# Patient Record
Sex: MALE | Race: WHITE | Employment: OTHER | URBAN - NONMETROPOLITAN AREA
[De-identification: names, ages, dates, MRNs, and addresses within clinical notes are randomized per-mention and may not be internally consistent; named-entity substitution may affect disease eponyms.]

---

## 2016-12-12 LAB — TSH SERPL DL<=0.05 MIU/L-ACNC: 0.96 UIU/ML

## 2017-01-04 ENCOUNTER — ANTI-COAG VISIT (OUTPATIENT)
Dept: PHARMACY | Facility: CLINIC | Age: 67
End: 2017-01-04

## 2017-01-04 LAB
HBA1C MFR BLD: 8 %
INR BLD: 2

## 2017-01-10 DIAGNOSIS — E11.42 DM TYPE 2 WITH DIABETIC PERIPHERAL NEUROPATHY (HCC): Primary | ICD-10-CM

## 2017-01-10 DIAGNOSIS — E03.9 ACQUIRED HYPOTHYROIDISM: ICD-10-CM

## 2017-02-08 ENCOUNTER — ANTI-COAG VISIT (OUTPATIENT)
Dept: PHARMACY | Facility: CLINIC | Age: 67
End: 2017-02-08

## 2017-02-08 LAB — INR BLD: 2.2

## 2017-02-09 ENCOUNTER — OFFICE VISIT (OUTPATIENT)
Dept: CARDIOLOGY CLINIC | Age: 67
End: 2017-02-09

## 2017-02-09 VITALS
BODY MASS INDEX: 34.79 KG/M2 | HEIGHT: 70 IN | OXYGEN SATURATION: 95 % | HEART RATE: 82 BPM | DIASTOLIC BLOOD PRESSURE: 64 MMHG | WEIGHT: 243 LBS | SYSTOLIC BLOOD PRESSURE: 112 MMHG

## 2017-02-09 DIAGNOSIS — E78.2 MIXED HYPERLIPIDEMIA: ICD-10-CM

## 2017-02-09 DIAGNOSIS — I25.10 CORONARY ARTERY DISEASE INVOLVING NATIVE CORONARY ARTERY OF NATIVE HEART WITHOUT ANGINA PECTORIS: ICD-10-CM

## 2017-02-09 DIAGNOSIS — I48.20 CHRONIC ATRIAL FIBRILLATION (HCC): Primary | ICD-10-CM

## 2017-02-09 DIAGNOSIS — I10 ESSENTIAL HYPERTENSION: ICD-10-CM

## 2017-02-09 PROCEDURE — G8484 FLU IMMUNIZE NO ADMIN: HCPCS | Performed by: INTERNAL MEDICINE

## 2017-02-09 PROCEDURE — G8598 ASA/ANTIPLAT THER USED: HCPCS | Performed by: INTERNAL MEDICINE

## 2017-02-09 PROCEDURE — 99214 OFFICE O/P EST MOD 30 MIN: CPT | Performed by: INTERNAL MEDICINE

## 2017-02-09 PROCEDURE — G8427 DOCREV CUR MEDS BY ELIG CLIN: HCPCS | Performed by: INTERNAL MEDICINE

## 2017-02-09 PROCEDURE — 1036F TOBACCO NON-USER: CPT | Performed by: INTERNAL MEDICINE

## 2017-02-09 PROCEDURE — G8417 CALC BMI ABV UP PARAM F/U: HCPCS | Performed by: INTERNAL MEDICINE

## 2017-02-09 PROCEDURE — 3017F COLORECTAL CA SCREEN DOC REV: CPT | Performed by: INTERNAL MEDICINE

## 2017-02-09 RX ORDER — BISACODYL 5 MG/1
1000 TABLET, COATED ORAL DAILY
COMMUNITY
End: 2019-01-08

## 2017-02-09 RX ORDER — LISINOPRIL 10 MG/1
10 TABLET ORAL DAILY
Qty: 90 TABLET | Refills: 3 | Status: SHIPPED | OUTPATIENT
Start: 2017-02-09 | End: 2017-02-10 | Stop reason: SDUPTHER

## 2017-02-09 RX ORDER — PRAVASTATIN SODIUM 20 MG
TABLET ORAL
Qty: 90 TABLET | Refills: 3 | Status: SHIPPED | OUTPATIENT
Start: 2017-02-09 | End: 2017-02-10 | Stop reason: SDUPTHER

## 2017-02-09 RX ORDER — LANOLIN ALCOHOL/MO/W.PET/CERES
1000 CREAM (GRAM) TOPICAL DAILY
COMMUNITY
End: 2019-01-08

## 2017-02-10 RX ORDER — PRAVASTATIN SODIUM 20 MG
TABLET ORAL
Qty: 90 TABLET | Refills: 3 | Status: SHIPPED | OUTPATIENT
Start: 2017-02-10 | End: 2017-08-10 | Stop reason: SDUPTHER

## 2017-02-10 RX ORDER — LISINOPRIL 10 MG/1
10 TABLET ORAL DAILY
Qty: 90 TABLET | Refills: 3 | Status: SHIPPED | OUTPATIENT
Start: 2017-02-10 | End: 2019-03-28 | Stop reason: SDUPTHER

## 2017-02-13 ENCOUNTER — HOSPITAL ENCOUNTER (OUTPATIENT)
Dept: OTHER | Age: 67
Discharge: HOME | End: 2017-02-13
Attending: INTERNAL MEDICINE | Admitting: INTERNAL MEDICINE

## 2017-02-13 ENCOUNTER — TELEPHONE (OUTPATIENT)
Dept: CARDIOLOGY CLINIC | Age: 67
End: 2017-02-13

## 2017-02-13 LAB
CHOLESTEROL, TOTAL: 140 MG/DL (ref 0–199)
HDLC SERPL-MCNC: 32 MG/DL (ref 40–60)
LDL CHOLESTEROL CALCULATED: 72 MG/DL
TRIGL SERPL-MCNC: 182 MG/DL (ref 0–150)
VLDLC SERPL CALC-MCNC: 36 MG/DL

## 2017-03-08 ENCOUNTER — ANTI-COAG VISIT (OUTPATIENT)
Dept: PHARMACY | Facility: CLINIC | Age: 67
End: 2017-03-08

## 2017-03-08 LAB — INR BLD: 1.3

## 2017-03-13 RX ORDER — LEVOTHYROXINE SODIUM 0.07 MG/1
75 TABLET ORAL DAILY
Qty: 90 TABLET | Refills: 1 | Status: SHIPPED | OUTPATIENT
Start: 2017-03-13 | End: 2017-07-24 | Stop reason: SDUPTHER

## 2017-03-14 ENCOUNTER — CARE COORDINATION (OUTPATIENT)
Dept: CARE COORDINATION | Age: 67
End: 2017-03-14

## 2017-03-15 ENCOUNTER — ANTI-COAG VISIT (OUTPATIENT)
Dept: PHARMACY | Facility: CLINIC | Age: 67
End: 2017-03-15

## 2017-03-15 LAB — INR BLD: 2.2

## 2017-03-24 ENCOUNTER — OFFICE VISIT (OUTPATIENT)
Dept: FAMILY MEDICINE CLINIC | Age: 67
End: 2017-03-24

## 2017-03-24 VITALS
BODY MASS INDEX: 35.75 KG/M2 | DIASTOLIC BLOOD PRESSURE: 87 MMHG | TEMPERATURE: 97.5 F | HEART RATE: 61 BPM | SYSTOLIC BLOOD PRESSURE: 111 MMHG | WEIGHT: 249.13 LBS

## 2017-03-24 DIAGNOSIS — J45.41 MODERATE PERSISTENT ASTHMA WITH ACUTE EXACERBATION: ICD-10-CM

## 2017-03-24 DIAGNOSIS — J20.9 ACUTE BRONCHITIS, UNSPECIFIED ORGANISM: Primary | ICD-10-CM

## 2017-03-24 PROCEDURE — 1036F TOBACCO NON-USER: CPT | Performed by: FAMILY MEDICINE

## 2017-03-24 PROCEDURE — G8428 CUR MEDS NOT DOCUMENT: HCPCS | Performed by: FAMILY MEDICINE

## 2017-03-24 PROCEDURE — 99213 OFFICE O/P EST LOW 20 MIN: CPT | Performed by: FAMILY MEDICINE

## 2017-03-24 PROCEDURE — G8598 ASA/ANTIPLAT THER USED: HCPCS | Performed by: FAMILY MEDICINE

## 2017-03-24 PROCEDURE — 1123F ACP DISCUSS/DSCN MKR DOCD: CPT | Performed by: FAMILY MEDICINE

## 2017-03-24 PROCEDURE — 4040F PNEUMOC VAC/ADMIN/RCVD: CPT | Performed by: FAMILY MEDICINE

## 2017-03-24 PROCEDURE — 3017F COLORECTAL CA SCREEN DOC REV: CPT | Performed by: FAMILY MEDICINE

## 2017-03-24 PROCEDURE — G8484 FLU IMMUNIZE NO ADMIN: HCPCS | Performed by: FAMILY MEDICINE

## 2017-03-24 PROCEDURE — 96372 THER/PROPH/DIAG INJ SC/IM: CPT | Performed by: FAMILY MEDICINE

## 2017-03-24 PROCEDURE — G8417 CALC BMI ABV UP PARAM F/U: HCPCS | Performed by: FAMILY MEDICINE

## 2017-03-24 RX ORDER — PREDNISONE 20 MG/1
20 TABLET ORAL 2 TIMES DAILY
Qty: 10 TABLET | Refills: 0 | Status: SHIPPED | OUTPATIENT
Start: 2017-03-24 | End: 2017-03-29

## 2017-03-24 RX ORDER — CEFTRIAXONE 1 G/1
1 INJECTION, POWDER, FOR SOLUTION INTRAMUSCULAR; INTRAVENOUS ONCE
Status: COMPLETED | OUTPATIENT
Start: 2017-03-24 | End: 2017-03-24

## 2017-03-24 RX ORDER — AMOXICILLIN 500 MG/1
1000 CAPSULE ORAL 2 TIMES DAILY
Qty: 40 CAPSULE | Refills: 0 | Status: SHIPPED | OUTPATIENT
Start: 2017-03-24 | End: 2017-04-03

## 2017-03-24 RX ADMIN — CEFTRIAXONE 1 G: 1 INJECTION, POWDER, FOR SOLUTION INTRAMUSCULAR; INTRAVENOUS at 12:03

## 2017-03-29 ENCOUNTER — ANTI-COAG VISIT (OUTPATIENT)
Dept: PHARMACY | Facility: CLINIC | Age: 67
End: 2017-03-29

## 2017-03-29 LAB — INR BLD: 3

## 2017-04-12 LAB — HBA1C MFR BLD: 7.8 %

## 2017-04-14 DIAGNOSIS — E11.42 DM TYPE 2 WITH DIABETIC PERIPHERAL NEUROPATHY (HCC): Primary | ICD-10-CM

## 2017-04-19 ENCOUNTER — TELEPHONE (OUTPATIENT)
Dept: PULMONOLOGY | Age: 67
End: 2017-04-19

## 2017-04-24 RX ORDER — DILTIAZEM HYDROCHLORIDE 360 MG/1
CAPSULE, EXTENDED RELEASE ORAL
Qty: 90 CAPSULE | Refills: 3 | Status: SHIPPED | OUTPATIENT
Start: 2017-04-24 | End: 2018-05-07 | Stop reason: SDUPTHER

## 2017-04-24 RX ORDER — ALBUTEROL SULFATE 90 UG/1
2 AEROSOL, METERED RESPIRATORY (INHALATION) EVERY 6 HOURS PRN
Qty: 3 INHALER | Refills: 1 | Status: SHIPPED | OUTPATIENT
Start: 2017-04-24 | End: 2017-11-20 | Stop reason: SDUPTHER

## 2017-05-04 ENCOUNTER — TELEPHONE (OUTPATIENT)
Dept: PHARMACY | Facility: CLINIC | Age: 67
End: 2017-05-04

## 2017-05-10 ENCOUNTER — ANTI-COAG VISIT (OUTPATIENT)
Dept: PHARMACY | Facility: CLINIC | Age: 67
End: 2017-05-10

## 2017-05-10 LAB — INR BLD: 1.8

## 2017-06-07 ENCOUNTER — ANTI-COAG VISIT (OUTPATIENT)
Dept: PHARMACY | Facility: CLINIC | Age: 67
End: 2017-06-07

## 2017-06-07 ENCOUNTER — OFFICE VISIT (OUTPATIENT)
Dept: FAMILY MEDICINE CLINIC | Age: 67
End: 2017-06-07

## 2017-06-07 VITALS
BODY MASS INDEX: 35.37 KG/M2 | HEART RATE: 54 BPM | DIASTOLIC BLOOD PRESSURE: 93 MMHG | OXYGEN SATURATION: 99 % | WEIGHT: 246.5 LBS | SYSTOLIC BLOOD PRESSURE: 143 MMHG

## 2017-06-07 DIAGNOSIS — R35.1 BPH ASSOCIATED WITH NOCTURIA: ICD-10-CM

## 2017-06-07 DIAGNOSIS — E78.2 MIXED HYPERLIPIDEMIA: ICD-10-CM

## 2017-06-07 DIAGNOSIS — Z51.81 MEDICATION MONITORING ENCOUNTER: ICD-10-CM

## 2017-06-07 DIAGNOSIS — I48.20 CHRONIC ATRIAL FIBRILLATION (HCC): ICD-10-CM

## 2017-06-07 DIAGNOSIS — E03.9 ACQUIRED HYPOTHYROIDISM: ICD-10-CM

## 2017-06-07 DIAGNOSIS — I10 ESSENTIAL HYPERTENSION: ICD-10-CM

## 2017-06-07 DIAGNOSIS — N40.1 BPH ASSOCIATED WITH NOCTURIA: ICD-10-CM

## 2017-06-07 DIAGNOSIS — E11.42 DM TYPE 2 WITH DIABETIC PERIPHERAL NEUROPATHY (HCC): Primary | ICD-10-CM

## 2017-06-07 DIAGNOSIS — I25.10 CORONARY ARTERY DISEASE INVOLVING NATIVE CORONARY ARTERY OF NATIVE HEART WITHOUT ANGINA PECTORIS: ICD-10-CM

## 2017-06-07 LAB
ALT SERPL-CCNC: 17 U/L (ref 10–40)
ANION GAP SERPL CALCULATED.3IONS-SCNC: 16 MMOL/L (ref 3–16)
BUN BLDV-MCNC: 14 MG/DL (ref 7–20)
CALCIUM SERPL-MCNC: 9 MG/DL (ref 8.3–10.6)
CHLORIDE BLD-SCNC: 102 MMOL/L (ref 99–110)
CHOLESTEROL, TOTAL: 145 MG/DL (ref 0–199)
CO2: 23 MMOL/L (ref 21–32)
CREAT SERPL-MCNC: 0.8 MG/DL (ref 0.8–1.3)
GFR AFRICAN AMERICAN: >60
GFR NON-AFRICAN AMERICAN: >60
GLUCOSE BLD-MCNC: 177 MG/DL (ref 70–99)
HDLC SERPL-MCNC: 30 MG/DL (ref 40–60)
INR BLD: 2
LDL CHOLESTEROL CALCULATED: 70 MG/DL
POTASSIUM SERPL-SCNC: 4.6 MMOL/L (ref 3.5–5.1)
SODIUM BLD-SCNC: 141 MMOL/L (ref 136–145)
TRIGL SERPL-MCNC: 225 MG/DL (ref 0–150)
TSH REFLEX: 2.92 UIU/ML (ref 0.27–4.2)
VLDLC SERPL CALC-MCNC: 45 MG/DL

## 2017-06-07 PROCEDURE — 1123F ACP DISCUSS/DSCN MKR DOCD: CPT | Performed by: FAMILY MEDICINE

## 2017-06-07 PROCEDURE — 99214 OFFICE O/P EST MOD 30 MIN: CPT | Performed by: FAMILY MEDICINE

## 2017-06-07 PROCEDURE — G8598 ASA/ANTIPLAT THER USED: HCPCS | Performed by: FAMILY MEDICINE

## 2017-06-07 PROCEDURE — 4040F PNEUMOC VAC/ADMIN/RCVD: CPT | Performed by: FAMILY MEDICINE

## 2017-06-07 PROCEDURE — 3045F PR MOST RECENT HEMOGLOBIN A1C LEVEL 7.0-9.0%: CPT | Performed by: FAMILY MEDICINE

## 2017-06-07 PROCEDURE — 3017F COLORECTAL CA SCREEN DOC REV: CPT | Performed by: FAMILY MEDICINE

## 2017-06-07 PROCEDURE — G8427 DOCREV CUR MEDS BY ELIG CLIN: HCPCS | Performed by: FAMILY MEDICINE

## 2017-06-07 PROCEDURE — 1036F TOBACCO NON-USER: CPT | Performed by: FAMILY MEDICINE

## 2017-06-07 PROCEDURE — 36415 COLL VENOUS BLD VENIPUNCTURE: CPT | Performed by: FAMILY MEDICINE

## 2017-06-07 PROCEDURE — G8417 CALC BMI ABV UP PARAM F/U: HCPCS | Performed by: FAMILY MEDICINE

## 2017-06-07 RX ORDER — TADALAFIL 20 MG/1
20 TABLET ORAL PRN
Qty: 30 TABLET | Refills: 3 | Status: SHIPPED | OUTPATIENT
Start: 2017-06-07 | End: 2017-06-16

## 2017-06-07 RX ORDER — TAMSULOSIN HYDROCHLORIDE 0.4 MG/1
0.4 CAPSULE ORAL DAILY
Qty: 30 CAPSULE | Refills: 3 | Status: SHIPPED | OUTPATIENT
Start: 2017-06-07 | End: 2017-06-23 | Stop reason: CLARIF

## 2017-06-08 ENCOUNTER — TELEPHONE (OUTPATIENT)
Dept: FAMILY MEDICINE CLINIC | Age: 67
End: 2017-06-08

## 2017-06-08 LAB
ESTIMATED AVERAGE GLUCOSE: 165.7 MG/DL
HBA1C MFR BLD: 7.4 %

## 2017-06-14 ENCOUNTER — TELEPHONE (OUTPATIENT)
Dept: FAMILY MEDICINE CLINIC | Age: 67
End: 2017-06-14

## 2017-06-14 RX ORDER — VARDENAFIL HYDROCHLORIDE 20 MG/1
20 TABLET ORAL PRN
Qty: 30 TABLET | Refills: 3 | Status: SHIPPED | OUTPATIENT
Start: 2017-06-14 | End: 2018-08-24 | Stop reason: ALTCHOICE

## 2017-06-18 PROBLEM — R55 SYNCOPE AND COLLAPSE: Status: ACTIVE | Noted: 2017-06-18

## 2017-06-18 PROBLEM — I48.91 ATRIAL FIBRILLATION WITH RVR (HCC): Status: ACTIVE | Noted: 2017-06-18

## 2017-06-18 PROBLEM — R79.1 SUBTHERAPEUTIC INTERNATIONAL NORMALIZED RATIO (INR): Status: ACTIVE | Noted: 2017-06-18

## 2017-06-19 PROBLEM — Z79.01 ANTICOAGULATED ON COUMADIN: Chronic | Status: ACTIVE | Noted: 2017-06-19

## 2017-06-19 PROBLEM — R35.1 BPH ASSOCIATED WITH NOCTURIA: Chronic | Status: ACTIVE | Noted: 2017-06-07

## 2017-06-19 PROBLEM — E66.9 OBESITY (BMI 30-39.9): Chronic | Status: ACTIVE | Noted: 2017-06-19

## 2017-06-19 PROBLEM — N40.1 BPH ASSOCIATED WITH NOCTURIA: Chronic | Status: ACTIVE | Noted: 2017-06-07

## 2017-06-20 ENCOUNTER — CARE COORDINATION (OUTPATIENT)
Dept: CASE MANAGEMENT | Age: 67
End: 2017-06-20

## 2017-06-23 ENCOUNTER — TELEPHONE (OUTPATIENT)
Dept: FAMILY MEDICINE CLINIC | Age: 67
End: 2017-06-23

## 2017-06-23 ENCOUNTER — CARE COORDINATION (OUTPATIENT)
Dept: CASE MANAGEMENT | Age: 67
End: 2017-06-23

## 2017-06-23 RX ORDER — FINASTERIDE 5 MG/1
5 TABLET, FILM COATED ORAL DAILY
Qty: 90 TABLET | Refills: 0 | Status: SHIPPED | OUTPATIENT
Start: 2017-06-23 | End: 2017-08-20 | Stop reason: SDUPTHER

## 2017-07-05 RX ORDER — METOPROLOL TARTRATE 50 MG/1
TABLET, FILM COATED ORAL
Qty: 180 TABLET | Refills: 1 | Status: SHIPPED | OUTPATIENT
Start: 2017-07-05 | End: 2017-08-18 | Stop reason: DRUGHIGH

## 2017-07-07 PROCEDURE — 93272 ECG/REVIEW INTERPRET ONLY: CPT | Performed by: INTERNAL MEDICINE

## 2017-07-10 ENCOUNTER — OFFICE VISIT (OUTPATIENT)
Dept: FAMILY MEDICINE CLINIC | Age: 67
End: 2017-07-10

## 2017-07-10 VITALS
SYSTOLIC BLOOD PRESSURE: 133 MMHG | BODY MASS INDEX: 36.25 KG/M2 | HEART RATE: 69 BPM | OXYGEN SATURATION: 96 % | DIASTOLIC BLOOD PRESSURE: 74 MMHG | WEIGHT: 245.5 LBS

## 2017-07-10 DIAGNOSIS — R55 SYNCOPE AND COLLAPSE: ICD-10-CM

## 2017-07-10 DIAGNOSIS — K31.84 DIABETIC GASTROPARESIS ASSOCIATED WITH TYPE 2 DIABETES MELLITUS (HCC): Primary | ICD-10-CM

## 2017-07-10 DIAGNOSIS — R06.09 DOE (DYSPNEA ON EXERTION): ICD-10-CM

## 2017-07-10 DIAGNOSIS — I48.91 ATRIAL FIBRILLATION WITH RVR (HCC): ICD-10-CM

## 2017-07-10 DIAGNOSIS — E11.43 DIABETIC GASTROPARESIS ASSOCIATED WITH TYPE 2 DIABETES MELLITUS (HCC): Primary | ICD-10-CM

## 2017-07-10 PROCEDURE — 1036F TOBACCO NON-USER: CPT | Performed by: FAMILY MEDICINE

## 2017-07-10 PROCEDURE — 3046F HEMOGLOBIN A1C LEVEL >9.0%: CPT | Performed by: FAMILY MEDICINE

## 2017-07-10 PROCEDURE — G8598 ASA/ANTIPLAT THER USED: HCPCS | Performed by: FAMILY MEDICINE

## 2017-07-10 PROCEDURE — 3017F COLORECTAL CA SCREEN DOC REV: CPT | Performed by: FAMILY MEDICINE

## 2017-07-10 PROCEDURE — 4040F PNEUMOC VAC/ADMIN/RCVD: CPT | Performed by: FAMILY MEDICINE

## 2017-07-10 PROCEDURE — G8427 DOCREV CUR MEDS BY ELIG CLIN: HCPCS | Performed by: FAMILY MEDICINE

## 2017-07-10 PROCEDURE — 99214 OFFICE O/P EST MOD 30 MIN: CPT | Performed by: FAMILY MEDICINE

## 2017-07-10 PROCEDURE — 1111F DSCHRG MED/CURRENT MED MERGE: CPT | Performed by: FAMILY MEDICINE

## 2017-07-10 PROCEDURE — 1123F ACP DISCUSS/DSCN MKR DOCD: CPT | Performed by: FAMILY MEDICINE

## 2017-07-10 PROCEDURE — G8417 CALC BMI ABV UP PARAM F/U: HCPCS | Performed by: FAMILY MEDICINE

## 2017-07-10 RX ORDER — METOCLOPRAMIDE 10 MG/1
10 TABLET ORAL
Qty: 90 TABLET | Refills: 3 | Status: SHIPPED | OUTPATIENT
Start: 2017-07-10 | End: 2017-07-10 | Stop reason: SDUPTHER

## 2017-07-10 RX ORDER — METOCLOPRAMIDE 10 MG/1
10 TABLET ORAL
Qty: 90 TABLET | Refills: 3 | Status: SHIPPED | OUTPATIENT
Start: 2017-07-10 | End: 2017-11-01 | Stop reason: SDUPTHER

## 2017-07-18 DIAGNOSIS — R55 SYNCOPE AND COLLAPSE: Primary | ICD-10-CM

## 2017-07-18 LAB
AVERAGE GLUCOSE: NORMAL
HBA1C MFR BLD: 7.6 %

## 2017-07-19 ENCOUNTER — ANTI-COAG VISIT (OUTPATIENT)
Dept: PHARMACY | Facility: CLINIC | Age: 67
End: 2017-07-19

## 2017-07-19 LAB — INR BLD: 3.5

## 2017-07-24 RX ORDER — LEVOTHYROXINE SODIUM 0.07 MG/1
TABLET ORAL
Qty: 90 TABLET | Refills: 1 | Status: SHIPPED | OUTPATIENT
Start: 2017-07-24 | End: 2017-11-01 | Stop reason: SDUPTHER

## 2017-07-26 ENCOUNTER — TELEPHONE (OUTPATIENT)
Dept: PULMONOLOGY | Age: 67
End: 2017-07-26

## 2017-08-02 DIAGNOSIS — E11.42 DM TYPE 2 WITH DIABETIC PERIPHERAL NEUROPATHY (HCC): Primary | Chronic | ICD-10-CM

## 2017-08-04 DIAGNOSIS — R35.1 BPH ASSOCIATED WITH NOCTURIA: ICD-10-CM

## 2017-08-04 DIAGNOSIS — N40.1 BPH ASSOCIATED WITH NOCTURIA: ICD-10-CM

## 2017-08-04 RX ORDER — TAMSULOSIN HYDROCHLORIDE 0.4 MG/1
CAPSULE ORAL
Qty: 90 CAPSULE | OUTPATIENT
Start: 2017-08-04

## 2017-08-07 ENCOUNTER — OFFICE VISIT (OUTPATIENT)
Dept: FAMILY MEDICINE CLINIC | Age: 67
End: 2017-08-07

## 2017-08-07 VITALS
OXYGEN SATURATION: 97 % | WEIGHT: 247.25 LBS | BODY MASS INDEX: 36.51 KG/M2 | SYSTOLIC BLOOD PRESSURE: 112 MMHG | HEART RATE: 61 BPM | DIASTOLIC BLOOD PRESSURE: 75 MMHG

## 2017-08-07 DIAGNOSIS — K31.84 DIABETIC GASTROPARESIS (HCC): Primary | ICD-10-CM

## 2017-08-07 DIAGNOSIS — E11.43 DIABETIC GASTROPARESIS (HCC): Primary | ICD-10-CM

## 2017-08-07 PROCEDURE — 3017F COLORECTAL CA SCREEN DOC REV: CPT | Performed by: FAMILY MEDICINE

## 2017-08-07 PROCEDURE — 99213 OFFICE O/P EST LOW 20 MIN: CPT | Performed by: FAMILY MEDICINE

## 2017-08-07 PROCEDURE — 4040F PNEUMOC VAC/ADMIN/RCVD: CPT | Performed by: FAMILY MEDICINE

## 2017-08-07 PROCEDURE — G8598 ASA/ANTIPLAT THER USED: HCPCS | Performed by: FAMILY MEDICINE

## 2017-08-07 PROCEDURE — 1123F ACP DISCUSS/DSCN MKR DOCD: CPT | Performed by: FAMILY MEDICINE

## 2017-08-07 PROCEDURE — G8427 DOCREV CUR MEDS BY ELIG CLIN: HCPCS | Performed by: FAMILY MEDICINE

## 2017-08-07 PROCEDURE — G8417 CALC BMI ABV UP PARAM F/U: HCPCS | Performed by: FAMILY MEDICINE

## 2017-08-07 PROCEDURE — 3046F HEMOGLOBIN A1C LEVEL >9.0%: CPT | Performed by: FAMILY MEDICINE

## 2017-08-07 PROCEDURE — 1036F TOBACCO NON-USER: CPT | Performed by: FAMILY MEDICINE

## 2017-08-09 ENCOUNTER — ANTI-COAG VISIT (OUTPATIENT)
Dept: PHARMACY | Facility: CLINIC | Age: 67
End: 2017-08-09

## 2017-08-09 LAB — INR BLD: 1.7

## 2017-08-10 RX ORDER — PRAVASTATIN SODIUM 20 MG
TABLET ORAL
Qty: 90 TABLET | Refills: 3 | Status: SHIPPED | OUTPATIENT
Start: 2017-08-10 | End: 2018-09-18 | Stop reason: SDUPTHER

## 2017-08-10 RX ORDER — LANSOPRAZOLE 30 MG/1
CAPSULE, DELAYED RELEASE ORAL
Qty: 90 CAPSULE | Refills: 3 | Status: SHIPPED | OUTPATIENT
Start: 2017-08-10 | End: 2017-11-01 | Stop reason: CLARIF

## 2017-08-11 ENCOUNTER — OFFICE VISIT (OUTPATIENT)
Dept: PULMONOLOGY | Age: 67
End: 2017-08-11

## 2017-08-11 VITALS
HEIGHT: 69 IN | SYSTOLIC BLOOD PRESSURE: 120 MMHG | RESPIRATION RATE: 16 BRPM | HEART RATE: 87 BPM | TEMPERATURE: 98 F | BODY MASS INDEX: 36.43 KG/M2 | WEIGHT: 246 LBS | DIASTOLIC BLOOD PRESSURE: 78 MMHG | OXYGEN SATURATION: 96 %

## 2017-08-11 DIAGNOSIS — J45.40 MODERATE PERSISTENT ASTHMA WITHOUT COMPLICATION: Primary | Chronic | ICD-10-CM

## 2017-08-11 DIAGNOSIS — R05.9 COUGH: ICD-10-CM

## 2017-08-11 PROCEDURE — 99213 OFFICE O/P EST LOW 20 MIN: CPT | Performed by: INTERNAL MEDICINE

## 2017-08-11 PROCEDURE — G8427 DOCREV CUR MEDS BY ELIG CLIN: HCPCS | Performed by: INTERNAL MEDICINE

## 2017-08-11 PROCEDURE — 1123F ACP DISCUSS/DSCN MKR DOCD: CPT | Performed by: INTERNAL MEDICINE

## 2017-08-11 PROCEDURE — G8598 ASA/ANTIPLAT THER USED: HCPCS | Performed by: INTERNAL MEDICINE

## 2017-08-11 PROCEDURE — 4040F PNEUMOC VAC/ADMIN/RCVD: CPT | Performed by: INTERNAL MEDICINE

## 2017-08-11 PROCEDURE — G8417 CALC BMI ABV UP PARAM F/U: HCPCS | Performed by: INTERNAL MEDICINE

## 2017-08-11 PROCEDURE — 3017F COLORECTAL CA SCREEN DOC REV: CPT | Performed by: INTERNAL MEDICINE

## 2017-08-11 PROCEDURE — 1036F TOBACCO NON-USER: CPT | Performed by: INTERNAL MEDICINE

## 2017-08-11 RX ORDER — ALBUTEROL SULFATE 90 UG/1
2 AEROSOL, METERED RESPIRATORY (INHALATION) EVERY 6 HOURS PRN
Qty: 3 INHALER | Refills: 1 | Status: SHIPPED | OUTPATIENT
Start: 2017-08-11 | End: 2017-11-20 | Stop reason: SDUPTHER

## 2017-08-11 RX ORDER — IPRATROPIUM BROMIDE AND ALBUTEROL SULFATE 2.5; .5 MG/3ML; MG/3ML
1 SOLUTION RESPIRATORY (INHALATION) EVERY 6 HOURS
Qty: 1080 ML | Refills: 1 | Status: SHIPPED | OUTPATIENT
Start: 2017-08-11 | End: 2018-11-29 | Stop reason: SDUPTHER

## 2017-08-18 ENCOUNTER — OFFICE VISIT (OUTPATIENT)
Dept: CARDIOLOGY CLINIC | Age: 67
End: 2017-08-18

## 2017-08-18 VITALS
HEART RATE: 66 BPM | BODY MASS INDEX: 35.84 KG/M2 | OXYGEN SATURATION: 97 % | HEIGHT: 69 IN | SYSTOLIC BLOOD PRESSURE: 120 MMHG | WEIGHT: 242 LBS | DIASTOLIC BLOOD PRESSURE: 78 MMHG

## 2017-08-18 DIAGNOSIS — I48.20 CHRONIC ATRIAL FIBRILLATION (HCC): Primary | Chronic | ICD-10-CM

## 2017-08-18 PROCEDURE — 93000 ELECTROCARDIOGRAM COMPLETE: CPT | Performed by: INTERNAL MEDICINE

## 2017-08-18 PROCEDURE — 99214 OFFICE O/P EST MOD 30 MIN: CPT | Performed by: INTERNAL MEDICINE

## 2017-08-18 PROCEDURE — 3017F COLORECTAL CA SCREEN DOC REV: CPT | Performed by: INTERNAL MEDICINE

## 2017-08-18 PROCEDURE — G8598 ASA/ANTIPLAT THER USED: HCPCS | Performed by: INTERNAL MEDICINE

## 2017-08-18 PROCEDURE — G8417 CALC BMI ABV UP PARAM F/U: HCPCS | Performed by: INTERNAL MEDICINE

## 2017-08-18 PROCEDURE — 1036F TOBACCO NON-USER: CPT | Performed by: INTERNAL MEDICINE

## 2017-08-18 PROCEDURE — G8427 DOCREV CUR MEDS BY ELIG CLIN: HCPCS | Performed by: INTERNAL MEDICINE

## 2017-08-18 PROCEDURE — 4040F PNEUMOC VAC/ADMIN/RCVD: CPT | Performed by: INTERNAL MEDICINE

## 2017-08-18 PROCEDURE — 1123F ACP DISCUSS/DSCN MKR DOCD: CPT | Performed by: INTERNAL MEDICINE

## 2017-08-18 RX ORDER — METOPROLOL TARTRATE 50 MG/1
TABLET, FILM COATED ORAL
Qty: 180 TABLET | Refills: 1 | Status: SHIPPED | OUTPATIENT
Start: 2017-08-18 | End: 2017-12-04 | Stop reason: SDUPTHER

## 2017-08-21 RX ORDER — FINASTERIDE 5 MG/1
TABLET, FILM COATED ORAL
Qty: 90 TABLET | Refills: 1 | Status: SHIPPED | OUTPATIENT
Start: 2017-08-21 | End: 2019-01-08

## 2017-09-06 ENCOUNTER — ANTI-COAG VISIT (OUTPATIENT)
Dept: PHARMACY | Facility: CLINIC | Age: 67
End: 2017-09-06

## 2017-09-06 DIAGNOSIS — I48.20 CHRONIC ATRIAL FIBRILLATION (HCC): ICD-10-CM

## 2017-09-06 LAB — INR BLD: 1.5

## 2017-09-25 ENCOUNTER — ANTI-COAG VISIT (OUTPATIENT)
Dept: PHARMACY | Facility: CLINIC | Age: 67
End: 2017-09-25

## 2017-09-25 LAB — INR BLD: 2.2

## 2017-10-12 ENCOUNTER — TELEPHONE (OUTPATIENT)
Dept: FAMILY MEDICINE CLINIC | Age: 67
End: 2017-10-12

## 2017-10-16 LAB
AVERAGE GLUCOSE: NORMAL
HBA1C MFR BLD: 7.1 %

## 2017-10-18 DIAGNOSIS — E11.42 DM TYPE 2 WITH DIABETIC PERIPHERAL NEUROPATHY (HCC): Primary | Chronic | ICD-10-CM

## 2017-10-30 ENCOUNTER — TELEPHONE (OUTPATIENT)
Dept: FAMILY MEDICINE CLINIC | Age: 67
End: 2017-10-30

## 2017-10-30 DIAGNOSIS — R25.1 TREMOR OF LEFT HAND: Primary | ICD-10-CM

## 2017-10-30 NOTE — TELEPHONE ENCOUNTER
Patient's wife calling wanting to know if patient can be referred to [facility] for his left hand tremor, please advise.

## 2017-11-01 DIAGNOSIS — E11.43 DIABETIC GASTROPARESIS ASSOCIATED WITH TYPE 2 DIABETES MELLITUS (HCC): ICD-10-CM

## 2017-11-01 DIAGNOSIS — K31.84 DIABETIC GASTROPARESIS ASSOCIATED WITH TYPE 2 DIABETES MELLITUS (HCC): ICD-10-CM

## 2017-11-01 RX ORDER — PANTOPRAZOLE SODIUM 40 MG/1
40 TABLET, DELAYED RELEASE ORAL DAILY
Qty: 90 TABLET | Refills: 1 | Status: SHIPPED | OUTPATIENT
Start: 2017-11-01 | End: 2018-04-02 | Stop reason: SDUPTHER

## 2017-11-01 RX ORDER — LEVOTHYROXINE SODIUM 0.07 MG/1
TABLET ORAL
Qty: 90 TABLET | Refills: 1 | Status: SHIPPED | OUTPATIENT
Start: 2017-11-01 | End: 2018-06-12 | Stop reason: SDUPTHER

## 2017-11-01 RX ORDER — METOCLOPRAMIDE 10 MG/1
10 TABLET ORAL
Qty: 270 TABLET | Refills: 0 | Status: SHIPPED | OUTPATIENT
Start: 2017-11-01 | End: 2017-12-06 | Stop reason: SINTOL

## 2017-11-02 ENCOUNTER — TELEPHONE (OUTPATIENT)
Dept: FAMILY MEDICINE CLINIC | Age: 67
End: 2017-11-02

## 2017-11-03 ENCOUNTER — OFFICE VISIT (OUTPATIENT)
Dept: ORTHOPEDIC SURGERY | Age: 67
End: 2017-11-03

## 2017-11-03 VITALS — WEIGHT: 242.06 LBS | BODY MASS INDEX: 35.85 KG/M2 | HEIGHT: 69 IN

## 2017-11-03 DIAGNOSIS — M25.522 BILATERAL ELBOW JOINT PAIN: Primary | ICD-10-CM

## 2017-11-03 DIAGNOSIS — M25.522 PAIN OF BOTH ELBOWS: ICD-10-CM

## 2017-11-03 DIAGNOSIS — M25.521 PAIN OF BOTH ELBOWS: ICD-10-CM

## 2017-11-03 DIAGNOSIS — M25.521 BILATERAL ELBOW JOINT PAIN: Primary | ICD-10-CM

## 2017-11-03 DIAGNOSIS — M19.022 PRIMARY OSTEOARTHRITIS OF LEFT ELBOW: ICD-10-CM

## 2017-11-03 PROCEDURE — 3017F COLORECTAL CA SCREEN DOC REV: CPT | Performed by: ORTHOPAEDIC SURGERY

## 2017-11-03 PROCEDURE — G8417 CALC BMI ABV UP PARAM F/U: HCPCS | Performed by: ORTHOPAEDIC SURGERY

## 2017-11-03 PROCEDURE — 1036F TOBACCO NON-USER: CPT | Performed by: ORTHOPAEDIC SURGERY

## 2017-11-03 PROCEDURE — 4040F PNEUMOC VAC/ADMIN/RCVD: CPT | Performed by: ORTHOPAEDIC SURGERY

## 2017-11-03 PROCEDURE — 99203 OFFICE O/P NEW LOW 30 MIN: CPT | Performed by: ORTHOPAEDIC SURGERY

## 2017-11-03 PROCEDURE — 1123F ACP DISCUSS/DSCN MKR DOCD: CPT | Performed by: ORTHOPAEDIC SURGERY

## 2017-11-03 PROCEDURE — G8484 FLU IMMUNIZE NO ADMIN: HCPCS | Performed by: ORTHOPAEDIC SURGERY

## 2017-11-03 PROCEDURE — G8598 ASA/ANTIPLAT THER USED: HCPCS | Performed by: ORTHOPAEDIC SURGERY

## 2017-11-03 PROCEDURE — G8427 DOCREV CUR MEDS BY ELIG CLIN: HCPCS | Performed by: ORTHOPAEDIC SURGERY

## 2017-11-03 NOTE — PROGRESS NOTES
DIAGNOSTIC CARDIAC CATH LAB PROCEDURE  8/9/13    ENDOSCOPY, COLON, DIAGNOSTIC  06/28/2016    EGD gastritis    FOOT SURGERY       Social History     Social History    Marital status:      Spouse name: N/A    Number of children: N/A    Years of education: N/A     Social History Main Topics    Smoking status: Former Smoker     Packs/day: 1.00     Years: 20.00     Types: Cigarettes     Quit date: 1/1/1990    Smokeless tobacco: Never Used    Alcohol use Yes      Comment: occas    Drug use: No    Sexual activity: Yes     Partners: Female     Other Topics Concern    None     Social History Narrative    None     Current Outpatient Prescriptions   Medication Sig Dispense Refill    metoclopramide (REGLAN) 10 MG tablet Take 1 tablet by mouth 3 times daily (with meals) 270 tablet 0    levothyroxine (SYNTHROID) 75 MCG tablet Take 1 tablet by mouth  daily 90 tablet 1    pantoprazole (PROTONIX) 40 MG tablet Take 1 tablet by mouth daily 90 tablet 1    glucose blood VI test strips (CRUZITO CONTOUR NEXT TEST) strip Testing 4 x a day DX: E11.42 125 each 5    insulin aspart (NOVOLOG FLEXPEN) 100 UNIT/ML injection vial Inject 25 Units into the skin 3 times daily Indications: 25-30 units 3 times a day Dispense flexpens 3 vial 3    finasteride (PROSCAR) 5 MG tablet Take 1 tablet by mouth  daily Stop Flomax 90 tablet 1    metoprolol tartrate (LOPRESSOR) 50 MG tablet TAKE 1 TABLET daily 180 tablet 1    fluticasone-salmeterol (ADVAIR DISKUS) 250-50 MCG/DOSE AEPB Inhale 1 puff into the lungs 2 times daily 3 each 1    albuterol sulfate HFA (PROAIR HFA) 108 (90 Base) MCG/ACT inhaler Inhale 2 puffs into the lungs every 6 hours as needed for Wheezing or Shortness of Breath 3 Inhaler 1    ipratropium-albuterol (DUONEB) 0.5-2.5 (3) MG/3ML SOLN nebulizer solution Inhale 3 mLs into the lungs every 6 hours DX Asthma 493.90 1080 mL 1    pravastatin (PRAVACHOL) 20 MG tablet TAKE 1 TABLET DAILY 90 tablet 3    insulin detemir (LEVEMIR FLEXTOUCH) 100 UNIT/ML injection pen Inject 50 Units into the skin 2 times daily 12 Pen 3    warfarin (COUMADIN) 10 MG tablet Take 10 mg by mouth      vardenafil (LEVITRA) 20 MG tablet Take 1 tablet by mouth as needed for Erectile Dysfunction 30 tablet 3    metFORMIN (GLUCOPHAGE) 1000 MG tablet TAKE 1 TABLET BY MOUTH 2 TIMES DAILY (WITH MEALS). 180 tablet 3    albuterol sulfate HFA (PROVENTIL HFA) 108 (90 BASE) MCG/ACT inhaler Inhale 2 puffs into the lungs every 6 hours as needed for Wheezing 3 Inhaler 1    diltiazem (CARDIZEM CD) 360 MG extended release capsule TAKE 1 CAPSULE DAILY 90 capsule 3    lisinopril (PRINIVIL;ZESTRIL) 10 MG tablet Take 1 tablet by mouth daily 90 tablet 3    vitamin B-12 (CYANOCOBALAMIN) 1000 MCG tablet Take 1,000 mcg by mouth daily      Omega-3 Fatty Acids (SB OMEGA-3 FISH OIL) 1000 MG CAPS Take 1,000 mg by mouth daily Ciro Red      meclizine (ANTIVERT) 25 MG tablet Take 1 tablet by mouth 3 times daily as needed for Dizziness 40 tablet 0    Misc. Devices ([facility]) MISC Please provide pt with Acapella, patient to use 10 breath QID. 1 each 0    Albiglutide 50 MG PEN Inject into the skin Use 1 time weekly - Dr. Hernandez Stewart      fluticasone [facility]) 50 MCG/ACT nasal spray 2 sprays by Nasal route daily 3 Bottle 1    Insulin Pen Needle (NOVOFINE) 30G X 8 MM MISC APPLY 1 EACH TOPICALLY 2 TIMES DAILY. 300 each 2    Cholecalciferol (VITAMIN D) 2000 UNITS CAPS capsule Take  by mouth.  nystatin (MYCOSTATIN) powder Apply 3 times daily. 1 Bottle 5     No current facility-administered medications for this visit. Allergies   Allergen Reactions    Lyrica [Pregabalin] Swelling    Simvastatin      Increased CPK       Review of Systems  Pertinent items are noted in HPI  Review of systems reviewed from Patient History Form dated on 11/3/17 and available in the patient's chart under the Media tab. Vital Signs  There were no vitals filed for this visit.     General Exam: tremor    Impression:  Encounter Diagnoses   Name Primary?  Bilateral elbow joint pain Yes    Pain of both elbows     Primary osteoarthritis of left elbow        Office Procedures:  Orders Placed This Encounter   Procedures    XR ELBOW RIGHT (MIN 3 VIEWS)     [id number]     Order Specific Question:   Reason for exam:     Answer:   Elbow Pain    XR ELBOW LEFT (MIN 3 VIEWS)     [id number]     Order Specific Question:   Reason for exam:     Answer:   Elbow Pain       Treatment Plan:  His main concern now is the tremor. I do not think that is emanating from his ulnar nerve or carpal tunnel. He does have an appointment with a neurologist which I think is most appropriate. I will be happy to see him back on an as-needed basis if there are any other peripheral nerve concerns, but for now we focused on modifying activities and occasionally using a padded elbow sleeve on the left side. Please note that this transcription was created using voice recognition software. Any errors are unintentional and may be due to voice recognition transcription.

## 2017-11-06 ENCOUNTER — ANTI-COAG VISIT (OUTPATIENT)
Dept: PHARMACY | Facility: CLINIC | Age: 67
End: 2017-11-06

## 2017-11-06 LAB — INR BLD: 2.2

## 2017-11-06 NOTE — PROGRESS NOTES
is here for management of anticoagulation for AFib. PMH also significant for DM, Hyopthyroid, GERD, HTN, CAD, COPD. He presents today w/out complaint. Pt verifies dosing regimen as listed above. Pt denies s/s bleeding/bruising/swelling/SOB. No BRBPR. No melena. No missed doses  Reviewed pt medication list.  No changes in RX/OTCs/Herbal medications. Reviewed dietary concerns. Pt states that he does not pay attention to diet, including for his diabetes. INR 2.2 is within acceptable therapeutic range of 2-3. Recommend to continue 10 mg daily except 15 mg on Wed. Patient has 5 mg and 2 mg tablets. Will continue to monitor and check INR in 6 weeks. Dosing reminder card given with phone number, appointment date and time.    Return to clinic:  12/20 @ 10:00am

## 2017-11-16 ENCOUNTER — OFFICE VISIT (OUTPATIENT)
Dept: CARDIOLOGY CLINIC | Age: 67
End: 2017-11-16

## 2017-11-16 VITALS
HEART RATE: 72 BPM | SYSTOLIC BLOOD PRESSURE: 110 MMHG | HEIGHT: 69 IN | BODY MASS INDEX: 37.18 KG/M2 | OXYGEN SATURATION: 96 % | WEIGHT: 251 LBS | DIASTOLIC BLOOD PRESSURE: 62 MMHG

## 2017-11-16 DIAGNOSIS — E78.2 MIXED HYPERLIPIDEMIA: Chronic | ICD-10-CM

## 2017-11-16 DIAGNOSIS — I48.20 CHRONIC ATRIAL FIBRILLATION (HCC): Chronic | ICD-10-CM

## 2017-11-16 DIAGNOSIS — I10 ESSENTIAL HYPERTENSION: Chronic | ICD-10-CM

## 2017-11-16 DIAGNOSIS — Z98.890 S/P CARDIAC CATH: Primary | Chronic | ICD-10-CM

## 2017-11-16 PROCEDURE — 3017F COLORECTAL CA SCREEN DOC REV: CPT | Performed by: INTERNAL MEDICINE

## 2017-11-16 PROCEDURE — G8417 CALC BMI ABV UP PARAM F/U: HCPCS | Performed by: INTERNAL MEDICINE

## 2017-11-16 PROCEDURE — 99214 OFFICE O/P EST MOD 30 MIN: CPT | Performed by: INTERNAL MEDICINE

## 2017-11-16 PROCEDURE — G8484 FLU IMMUNIZE NO ADMIN: HCPCS | Performed by: INTERNAL MEDICINE

## 2017-11-16 PROCEDURE — 1123F ACP DISCUSS/DSCN MKR DOCD: CPT | Performed by: INTERNAL MEDICINE

## 2017-11-16 PROCEDURE — 1036F TOBACCO NON-USER: CPT | Performed by: INTERNAL MEDICINE

## 2017-11-16 PROCEDURE — 4040F PNEUMOC VAC/ADMIN/RCVD: CPT | Performed by: INTERNAL MEDICINE

## 2017-11-16 PROCEDURE — G8598 ASA/ANTIPLAT THER USED: HCPCS | Performed by: INTERNAL MEDICINE

## 2017-11-16 PROCEDURE — G8427 DOCREV CUR MEDS BY ELIG CLIN: HCPCS | Performed by: INTERNAL MEDICINE

## 2017-11-16 NOTE — PROGRESS NOTES
[address]   Cardiac Followup    Referring Provider:  Brandon Jackson MD     Chief Complaint   Patient presents with    6 Month Follow-Up     ER 06/18-06/19/2017, ekg 08/18/2017, JMB 08/18/2017    Atrial Fibrillation    Coronary Artery Disease    Hyperlipidemia    Hypertension    Results     carotid 06/19/2017, HM 07/18/2017 & echo 06/19/2017    Discuss Labs     06/19/2017    Shortness of Breath    Edema     ankles    Fatigue     zero energy        History of Present Illness:    Mr. Vero Burk is a 72yo male here today for routine cardiology follow up of his mild non-obstructive CAD, GERD, HTN, and chronic atrial fibrillation. Last OV was February 2017. He had left heart cath on 8/1/06 showed LVEF of 60% with 20% stenosis in the 1st diagonal, luminal irregularities in the LAD, and very mild non-obstructive CAD. [facility] manages his PT/INR and adjusts his medications. He underwent most recent cardiac cath on 8/9/13 which showed mild non-obstructive disease (20% LM, 30% LAD, <20% CCx, 20% RCA, and LVEF=50%) and patient is being managed medically. No need for PCI. Most recent SAJI BLE 1/4/2016 showed no evidence of stenosis in BLE. Normal SAJI's ankle-brachial  measured at 1.19 on the right and 1.25 on the left. Most recent EKG 8/18/17 AFIB 59 bpm nonspecific ST change. Most recent Cardiac event monitor 6/20-7/3/17 AFIB showed an average HR of 85 ()  was brief. Most recent ECHO 6/19/17 EF 55-60% mild LVH, elevated diastolic filling pressure, mild LA enlargement (no change from 2012). Carotid US June 2017 was negative for blockage   Today, he reports feeling OK overall and he continues to report chronic SOB which is related to his CLARISSE and asthma. He refuses to wear CPAP. He has  fatigue which is not new. He denies CP, palpitations, dizziness, or syncope. He farms cattle.  His friend Sharla Ortiz is present at exam. She reports he has new tremors in his BUE and she has him topically daily. Faxed to [facility] 1/18/12   Cruzito Valdez MD   tiotropium (SPIRIVA HANDIHALER) 18 MCG inhalation capsule Inhale 1 capsule into the lungs daily. 10/25/11   Cruzito Valdez MD   warfarin (COUMADIN) 4 MG tablet Take 1 tablet by mouth daily. 7/14/11   Yolanda Lambert MD        Allergies:  Lyrica [pregabalin] and Simvastatin     Review of Systems:   · Constitutional: there has been no unanticipated weight loss. There's been no change in energy level, sleep pattern, or activity level. · Eyes: No visual changes or diplopia. No scleral icterus. · ENT: No Headaches, hearing loss or vertigo. No mouth sores or sore throat. · Cardiovascular: Reviewed in HPI  · Respiratory: No cough or wheezing, no sputum production. No hematemesis. · Gastrointestinal: No abdominal pain, appetite loss, blood in stools. No change in bowel or bladder habits. · Genitourinary: No dysuria, trouble voiding, or hematuria. · Musculoskeletal:  No gait disturbance, weakness or joint complaints. · Integumentary: No rash or pruritis. · Neurological: No headache, diplopia, change in muscle strength, numbness or tingling. No change in gait, balance, coordination, mood, affect, memory, mentation, behavior. · Psychiatric: No anxiety, no depression. · Endocrine: No malaise, fatigue or temperature intolerance. No excessive thirst, fluid intake, or urination. No tremor. · Hematologic/Lymphatic: No abnormal bruising or bleeding, blood clots or swollen lymph nodes. · Allergic/Immunologic: No nasal congestion or hives.     Physical Examination:    Vitals:    11/16/17 1443   BP: 110/62   Pulse: 72   SpO2: 96%        Constitutional and General Appearance: NAD   Respiratory:  · Normal excursion and expansion without use of accessory muscles  · Resp Auscultation: Normal breath sounds without dullness  Cardiovascular:  · The apical impulses not displaced  · Heart tones are crisp and normal  · Cervical veins are not Judy recommended taking metoprolol tartrate only 1x daily due to lower HR at night but he feels better with BID regimen and will go ahead and continue. 5. CAD (coronary artery disease):  Mild non-obstructive CAD and will continue medical management. Stable and will continue present medical regimen. 6.  Hyperlipidemia: Most recent 6/19/17 I personally reviewed (see above results). He had intolerance to Zocor therapy back in Jan 2013 with muscle cramping and CK was noted to 476. He continues Pravastatin 20 mg daily. He has been tolerating without diffuse muscle cramping. His TG were over 500 and this is way too high but labs were non-fasting and PCP did not want to change regimen. Needs to watch diet more closely. Plan:  1. I have encouraged better diet and making sure diabetes is controlled as best possible. 2. Follow up with me in 9 months  3. Advised to follow through and contact Dr. Claudia Crockett to get CPAP machine at night for CLARISSE. I believe his fatigue is mainly related to CLARISSE. 4. No need for cardiac testing at this time. Cost of prescription medications and patient compliance have been reviewed with patient. All questions answered. Thank you for allowing me to participate in the care of this individual.    Monica Santos.  Shahid Chavarria M.D., [facility]

## 2017-11-16 NOTE — PATIENT INSTRUCTIONS
Plan:  1. I've again encouraged better diet and making sure diabetes is controlled as best possible. 2. Follow up with me in 9 months  3. Advised to follow through with wearing cpap at night for CLARISSE  Follow up with Dr Pavan Pickens  4. No need for cardiac testing at this time.

## 2017-11-16 NOTE — COMMUNICATION BODY
Josi Bernard MD   mupirocin [facility] NASAL) 2 % nasal ointment Take by Nasal route 2 times daily. 8/26/12  Yes Kevan Ferguson MD   torsemide (DEMADEX) 100 MG tablet Take 1 tablet by mouth every morning. As needed for swelling 8/17/12  Yes Rojelio Calderon MD   Tadalafil (CIALIS) 2.5 MG TABS Take 2.5 mg by mouth daily. 8/13/12  Yes Rojelio Calderon MD   warfarin (COUMADIN) 6 MG tablet Take 12 mg by mouth daily. Yes Historical Provider, MD   glucose blood VI test strips (ASCENSIA AUTODISC VI;ONE TOUCH ULTRA TEST VI) strip Patient uses PowerMag machine. Patient tests tid 5/29/12 5/24/13 Yes Rojelio Calderon MD   diltiazem (CARDIZEM CD) 360 MG ER capsule Take 1 capsule by mouth daily. 4/12/12  Yes Rojelio Calderon MD   insulin aspart (NOVOLOG) 100 UNIT/ML injection Inject 24 Units into the skin 3 times daily (before meals). Please make sure it is the Flexpens. 2/2/12  Yes Rojelio Calderon MD   metoprolol (LOPRESSOR) 25 MG tablet Take 1 tablet by mouth daily. Faxed to [facility] 1/18/12  Yes Rojelio Calderon MD   lisinopril (PRINIVIL;ZESTRIL) 10 MG tablet Take 1 tablet by mouth daily. Faxed to [facility]. 1/18/12  Yes Rojelio Calderon MD   levothyroxine (LEVOTHROID) 50 MCG tablet Take 1 tablet by mouth daily. [facility]ed [facility] 1/18/12  Yes Rojelio Calderon MD   insulin detemir (LEVEMIR FLEXPEN) 100 UNIT/ML injection Inject 60 Units into the skin 2 times daily. 10/17/11  Yes Rojelio Calderon MD   esomeprazole (NEXIUM) 40 MG capsule Take 1 capsule by mouth daily. 10/17/11 10/16/12 Yes Rojelio Calderon MD   fluticasone-salmeterol (ADVAIR HFA) 82-12 MCG/ACT inhaler Inhale 2 puffs into the lungs 2 times daily. 6/29/11  Yes Rojelio Calderon MD   Insulin Pen Needle (NOVOFINE) 30G X 8 MM MISC  5/26/11  Yes Rojelio Calderon MD   warfarin (COUMADIN) 2 MG tablet Take 1 tablet by mouth daily.  3/19/12 3/19/13  Rojelio Calderon MD   testosterone (ANDROGEL; TESTIM) 50 MG/5GM GEL gel Apply 5 g engorged  · The carotid upstroke is normal in amplitude and contour without delay or bruit  · Irregularly irregular, No S3, No Murmur  · Peripheral pulses are symmetrical and full  · There is no clubbing, cyanosis of the extremities. · Trace BLE edema  · Femoral Arteries: 2+ and equal  · Pedal Pulses: 2+ and equal   Abdomen:  · No masses or tenderness  · Liver/Spleen: No Abnormalities Noted  Neurological/Psychiatric:  · Alert and oriented in all spheres  · Moves all extremities well  · Exhibits normal gait balance and coordination  · No abnormalities of mood, affect, memory, mentation, or behavior are noted    Cardiac Cath: 8/9/13    1. CARDIAC CATHETERIZATION [CATH01]        LM 20%   LAD 30%   LCx <20%   RCA 20%   LVEF 50%       Lab Results   Component Value Date    CHOL 145 06/19/2017    CHOL 145 06/07/2017    CHOL 140 02/13/2017     Lab Results   Component Value Date    TRIG 528 (H) 06/19/2017    TRIG 225 (H) 06/07/2017    TRIG 182 (H) 02/13/2017     Lab Results   Component Value Date    HDL 25 (L) 06/19/2017    HDL 30 (L) 06/07/2017    HDL 32 (L) 02/13/2017     Lab Results   Component Value Date    LDLCALC 90 12/08/2016    LDLCALC 82 12/08/2015    LDLCALC 65 06/08/2015     Lab Results   Component Value Date    LABVLDL see below 12/08/2016    LABVLDL 48 12/08/2015    LABVLDL 49 06/08/2015     No results found for: CHOLHDLRATIO    Assessment:     1. Hypertension : Stable and well controlled and will continue present medical regimen. 2. GERD (gastroesophageal reflux disease) : managed per PCP. 3. DM (diabetes mellitus)  : managed per PCP     4. Atrial fibrillation:  Mainstay of treatment is HR control and anticoagulation therapy. Most recent EKG 8/18/17 AFIB 59 bpm nonspecific ST change. Note [facility] manages his coumadin therapy now (used to be Dr. Sharita Regalado office). Most recent cardiac event monitor 6/20-7/3/17 showed afib with an average HR of 85bpm (40-172bpm)  was brief.

## 2017-11-20 ENCOUNTER — OFFICE VISIT (OUTPATIENT)
Dept: PULMONOLOGY | Age: 67
End: 2017-11-20

## 2017-11-20 ENCOUNTER — HOSPITAL ENCOUNTER (OUTPATIENT)
Dept: SLEEP MEDICINE | Age: 67
Discharge: HOME | End: 2017-11-22
Attending: INTERNAL MEDICINE | Admitting: INTERNAL MEDICINE

## 2017-11-20 VITALS
OXYGEN SATURATION: 97 % | DIASTOLIC BLOOD PRESSURE: 72 MMHG | TEMPERATURE: 97.2 F | HEART RATE: 84 BPM | HEIGHT: 70 IN | WEIGHT: 252 LBS | RESPIRATION RATE: 16 BRPM | BODY MASS INDEX: 36.08 KG/M2 | SYSTOLIC BLOOD PRESSURE: 116 MMHG

## 2017-11-20 DIAGNOSIS — J45.41 MODERATE PERSISTENT ASTHMA WITH ACUTE EXACERBATION: ICD-10-CM

## 2017-11-20 DIAGNOSIS — G47.33 OSA (OBSTRUCTIVE SLEEP APNEA): Primary | Chronic | ICD-10-CM

## 2017-11-20 DIAGNOSIS — R06.02 SHORTNESS OF BREATH: ICD-10-CM

## 2017-11-20 PROCEDURE — 1123F ACP DISCUSS/DSCN MKR DOCD: CPT | Performed by: INTERNAL MEDICINE

## 2017-11-20 PROCEDURE — 99214 OFFICE O/P EST MOD 30 MIN: CPT | Performed by: INTERNAL MEDICINE

## 2017-11-20 PROCEDURE — 4040F PNEUMOC VAC/ADMIN/RCVD: CPT | Performed by: INTERNAL MEDICINE

## 2017-11-20 PROCEDURE — 3017F COLORECTAL CA SCREEN DOC REV: CPT | Performed by: INTERNAL MEDICINE

## 2017-11-20 PROCEDURE — 1036F TOBACCO NON-USER: CPT | Performed by: INTERNAL MEDICINE

## 2017-11-20 PROCEDURE — G8427 DOCREV CUR MEDS BY ELIG CLIN: HCPCS | Performed by: INTERNAL MEDICINE

## 2017-11-20 PROCEDURE — G8598 ASA/ANTIPLAT THER USED: HCPCS | Performed by: INTERNAL MEDICINE

## 2017-11-20 PROCEDURE — G8417 CALC BMI ABV UP PARAM F/U: HCPCS | Performed by: INTERNAL MEDICINE

## 2017-11-20 PROCEDURE — G8484 FLU IMMUNIZE NO ADMIN: HCPCS | Performed by: INTERNAL MEDICINE

## 2017-11-20 RX ORDER — PREDNISONE 20 MG/1
40 TABLET ORAL DAILY
Qty: 10 TABLET | Refills: 0 | Status: SHIPPED | OUTPATIENT
Start: 2017-11-20 | End: 2017-11-25

## 2017-11-20 RX ORDER — ALBUTEROL SULFATE 90 UG/1
2 AEROSOL, METERED RESPIRATORY (INHALATION) EVERY 6 HOURS PRN
Qty: 3 INHALER | Refills: 1 | Status: SHIPPED | OUTPATIENT
Start: 2017-11-20 | End: 2018-05-03 | Stop reason: SDUPTHER

## 2017-11-20 RX ORDER — EMPAGLIFLOZIN 25 MG/1
10 TABLET, FILM COATED ORAL DAILY
Refills: 2 | COMMUNITY
Start: 2017-09-06

## 2017-11-20 ASSESSMENT — SLEEP AND FATIGUE QUESTIONNAIRES
HOW LIKELY ARE YOU TO NOD OFF OR FALL ASLEEP WHILE LYING DOWN TO REST IN THE AFTERNOON WHEN CIRCUMSTANCES PERMIT: 3
HOW LIKELY ARE YOU TO NOD OFF OR FALL ASLEEP WHILE SITTING AND READING: 0
NECK CIRCUMFERENCE (INCHES): 21
HOW LIKELY ARE YOU TO NOD OFF OR FALL ASLEEP IN A CAR, WHILE STOPPED FOR A FEW MINUTES IN TRAFFIC: 2
HOW LIKELY ARE YOU TO NOD OFF OR FALL ASLEEP WHEN YOU ARE A PASSENGER IN A CAR FOR AN HOUR WITHOUT A BREAK: 3
HOW LIKELY ARE YOU TO NOD OFF OR FALL ASLEEP WHILE WATCHING TV: 2
HOW LIKELY ARE YOU TO NOD OFF OR FALL ASLEEP WHILE SITTING AND TALKING TO SOMEONE: 2
HOW LIKELY ARE YOU TO NOD OFF OR FALL ASLEEP WHILE SITTING QUIETLY AFTER LUNCH WITHOUT ALCOHOL: 2
HOW LIKELY ARE YOU TO NOD OFF OR FALL ASLEEP WHILE SITTING INACTIVE IN A PUBLIC PLACE: 3
ESS TOTAL SCORE: 17

## 2017-11-20 NOTE — PROGRESS NOTES
CHIEF COMPLAINT: Dyspnea and cough    Consulting provider: Dr. Krystina Bryant    HPI:    Presenting Hx: The patient is a 75 yo man with hx of asthma,DM, CAD, CLARISSE previously on CPAP, paroxysmal atrial fibrillation who presents for evaluation of chronic dyspnea and cough. Patient complains of shortness of breath. Symptoms include difficulty breathing, drainage from nose, dyspnea on exertion, post nasal drip and productive cough. Symptoms began 3 years ago, gradually worsening since that time. The dyspnea occurs with minimal activity. Patient denies hemoptysis . Aggravating factors include exertion and dust, pollen, mold. The patient reports an exercise tolerance of approximately 2 blocks on the flat and 2 flights of stairs, limited primarily by dyspnea. He is a former smoker, 20 years x 1ppd, last smoked 4 mos ago. He has been treated with a variety of Inhaled bronchodilators with some response. Currently, he takes advair and albuterol with some improvement but has persistent nasal congestion. Since last clinic visit, the patient has been more fatigued. His cardiologist encouraged him to reconsider CPAP treatment. Snores at night, wakes himself snoring. Has witnessed apnea. Fatigue and tiredness during the day. He has a history of sleep apnea. Goes to sleep at 11 pm, wakes up 7 am. It takes 30 minutes to fall asleep. Wakes up 2-3 times at night. It takes him 30-45 minutes to fall back a sleep. Takes 1 nap during the day ( 30 minutes). No headache in am. No car wrecks or near wrecks because of the sleepiness. No weight gain. No significant caffienated drinks or alcohol. t. No leg jerks during sleep. No restless feelings in legs at night. No numbness or burning in leg or feet. No leg aches or cramps . No loss of muscle strength when angry or laugh. No hallucination when dozing off or waking up from sleep. No paralysis upon awakening from sleep or going to sleep. No teeth grinding, nightmares, sleep walking.  ESS is Diabetes Mother     Heart Failure Mother     Hypertension Mother     Other Father      CAD    Diabetes Father     Heart Failure Father     Hypertension Father     Prostate Cancer Maternal Uncle     Emphysema Paternal Grandfather     Asthma Neg Hx     Cancer Neg Hx        Current Medications:    Current Outpatient Prescriptions:     metoclopramide (REGLAN) 10 MG tablet, Take 1 tablet by mouth 3 times daily (with meals), Disp: 270 tablet, Rfl: 0    levothyroxine (SYNTHROID) 75 MCG tablet, Take 1 tablet by mouth  daily, Disp: 90 tablet, Rfl: 1    pantoprazole (PROTONIX) 40 MG tablet, Take 1 tablet by mouth daily, Disp: 90 tablet, Rfl: 1    glucose blood VI test strips (CRUZITO CONTOUR NEXT TEST) strip, Testing 4 x a day DX: E11.42, Disp: 125 each, Rfl: 5    insulin aspart (NOVOLOG FLEXPEN) 100 UNIT/ML injection vial, Inject 25 Units into the skin 3 times daily Indications: 25-30 units 3 times a day Dispense flexpens, Disp: 3 vial, Rfl: 3    finasteride (PROSCAR) 5 MG tablet, Take 1 tablet by mouth  daily Stop Flomax, Disp: 90 tablet, Rfl: 1    metoprolol tartrate (LOPRESSOR) 50 MG tablet, TAKE 1 TABLET daily (Patient taking differently: Take 50 mg by mouth 2 times daily TAKE 1 TABLET daily), Disp: 180 tablet, Rfl: 1    fluticasone-salmeterol (ADVAIR DISKUS) 250-50 MCG/DOSE AEPB, Inhale 1 puff into the lungs 2 times daily, Disp: 3 each, Rfl: 1    albuterol sulfate HFA (PROAIR HFA) 108 (90 Base) MCG/ACT inhaler, Inhale 2 puffs into the lungs every 6 hours as needed for Wheezing or Shortness of Breath, Disp: 3 Inhaler, Rfl: 1    ipratropium-albuterol (DUONEB) 0.5-2.5 (3) MG/3ML SOLN nebulizer solution, Inhale 3 mLs into the lungs every 6 hours DX Asthma 493.90, Disp: 1080 mL, Rfl: 1    pravastatin (PRAVACHOL) 20 MG tablet, TAKE 1 TABLET DAILY, Disp: 90 tablet, Rfl: 3    insulin detemir (LEVEMIR FLEXTOUCH) 100 UNIT/ML injection pen, Inject 50 Units into the skin 2 times daily, Disp: 12 Pen, Rfl: 3   Constitutional: In no acute distress. Appears stated age. Obese. Eyes: PERRL. No sclera icterus. No conjunctival injection. ENT: No ocular or auricular discharge or visible masses. Oropharynx clear. MP3  Neck: Trachea midline. Normal thyroid. Resp: No accessory muscle use. No crackles. Bilateral wheezes. No rhonchi. No dullness on percussion. Poor air movement. CV: Regular rate. Irregular rhythm. No murmur or rub. Normal S1 and S2. No edema  GI: Abdomen non-tender. Non-distended. No masses. Skin: Warm and dry. No nodules on exposed extremities. No rash on exposed extremities. Lymph: No cervical LAD. No supraclavicular LAD. M/S: No cyanosis. No synovitis or joint deformity. No clubbing. Neuro: Cranial nerves are grossly intact. Moving all extremities. Motor and sensation grossly intact. Psych: Oriented x 3. No anxiety or agitation. DATA:      LABS:        PFTs 10/9/12  FVC 4.34 (91%) FEV1 3.40 (94%) FEV1/FVC ratio 78%  TLC 5.92 (82%) DLCO 25.9 (82%) no bd response  6MWT 1400 feet, low sat 94%        IMAGING:      I personally reviewed and interpreted the following in the office :       Chest Ct 10/27/14:   Comparison with 10/28/2013 and 10/9/2012. Several subcentimeter noncalcified nodules in both lungs are unchanged. There are no new nodules. There is no evidence of adenopathy. The pleural and pericardial spaces are clear. The upper abdomen is unremarkable. Chest CT 10/28/13: There is no mediastinal, hilar, or axillary adenopathy. There are no pleural effusions. There has been no interval change in a noncalcified nodule within the lateral left lower lobe. This nodule measures approximately 5 mm on today's exam. Slight differences in size are felt to be related to volume averaging rather than true interval growth. There are subpleural nodules within the lateral aspect of the right middle lobe and within the medial aspect of the left upper lobe measuring 2 to 3 mm in size each.  These are unchanged. No new or enlarging pulmonary nodule is seen. No focal airspace consolidation is identified. There is fatty infiltration of the liver. Visualized portions of the adrenal glands and upper abdomen appear within normal limits. Bone window images demonstrate postoperative changes of the right shoulder. Chest CT 10/9/12: The airways are patent. No pleural fluid is present. The lungs are well expanded. No acute airspace disease. There is a 4 mm nodule in the lateral basilar segment of the left lower lobe. A partially calcified nodule is observed in the anterior segment of the right upper lobe. There are calcified mediastinal and right hilar lymph nodes. Soft tissues at the base of the neck are normal. Heart and mediastinum show no other significant findings. The esophagus tapers normally. No adrenal mass or nodule. The visualized abdominal and skeletal structures show no significant findings. There is a remote fracture to the posterior left eleventh rib. CXR (dated 9/9/12): mild hyperinflation, normal cardiomediastinal silhouette; left basilar retrocardiac streaky opacity      ASSESSMENT AND PLAN:    CLARISSE (Obstructive Sleep Apnea)   Patient has been non-compliant with CPAP for last several years. - patient not interested in trying again; discussed risks previously        Asthma with AE  - prednisone 40 mg x 5 days  Per hx. Patient states that change in weather is a trigger. PFTs did not show obstruction  - continue Advair, lower dose, continue albuterol prn  - control nasal symptoms with flonase      Cough    I favor either PND or cough variant asthma as etiology. Stable  - continue nasal ICS     Dyspnea    Based on the information available thus far, I favor a diagnosis of asthma or CAD.    -Stable      Inactive problems    Pulmonary nodules  - 4mm in LLL  - stable for 2 years, no further f/u needed

## 2017-11-21 DIAGNOSIS — G47.33 OSA (OBSTRUCTIVE SLEEP APNEA): Primary | Chronic | ICD-10-CM

## 2017-11-27 RX ORDER — WARFARIN SODIUM 5 MG/1
TABLET ORAL
Qty: 180 TABLET | Refills: 1 | Status: SHIPPED | OUTPATIENT
Start: 2017-11-27 | End: 2018-05-06 | Stop reason: SDUPTHER

## 2017-12-04 ENCOUNTER — TELEPHONE (OUTPATIENT)
Dept: FAMILY MEDICINE CLINIC | Age: 67
End: 2017-12-04

## 2017-12-04 RX ORDER — METOPROLOL TARTRATE 50 MG/1
50 TABLET, FILM COATED ORAL 2 TIMES DAILY
Qty: 180 TABLET | Refills: 3 | Status: SHIPPED | OUTPATIENT
Start: 2017-12-04 | End: 2019-01-28 | Stop reason: SDUPTHER

## 2017-12-04 NOTE — TELEPHONE ENCOUNTER
Today was patient's 3rd no show. He has not been called yet. A voicemail was left to confirm his appointment.

## 2017-12-05 NOTE — TELEPHONE ENCOUNTER
I spoke to wife she states he is getting very forgetful she even reminded him. I rescheduled him for a cancellation tomorrow morning. Advised her we usually dismiss a patient after 3 no show.

## 2017-12-06 ENCOUNTER — OFFICE VISIT (OUTPATIENT)
Dept: FAMILY MEDICINE CLINIC | Age: 67
End: 2017-12-06

## 2017-12-06 VITALS
SYSTOLIC BLOOD PRESSURE: 124 MMHG | HEART RATE: 70 BPM | WEIGHT: 247.5 LBS | BODY MASS INDEX: 35.43 KG/M2 | HEIGHT: 70 IN | TEMPERATURE: 97.7 F | DIASTOLIC BLOOD PRESSURE: 71 MMHG | OXYGEN SATURATION: 98 %

## 2017-12-06 DIAGNOSIS — E03.9 ACQUIRED HYPOTHYROIDISM: Chronic | ICD-10-CM

## 2017-12-06 DIAGNOSIS — E11.42 DM TYPE 2 WITH DIABETIC PERIPHERAL NEUROPATHY (HCC): Primary | Chronic | ICD-10-CM

## 2017-12-06 DIAGNOSIS — E78.2 MIXED HYPERLIPIDEMIA: Chronic | ICD-10-CM

## 2017-12-06 DIAGNOSIS — Z23 NEED FOR INFLUENZA VACCINATION: ICD-10-CM

## 2017-12-06 DIAGNOSIS — I48.20 CHRONIC ATRIAL FIBRILLATION (HCC): Chronic | ICD-10-CM

## 2017-12-06 DIAGNOSIS — R25.1 TREMOR: ICD-10-CM

## 2017-12-06 DIAGNOSIS — R41.3 MEMORY LOSS: ICD-10-CM

## 2017-12-06 DIAGNOSIS — I10 ESSENTIAL HYPERTENSION: Chronic | ICD-10-CM

## 2017-12-06 PROBLEM — I48.91 ATRIAL FIBRILLATION WITH RVR (HCC): Status: RESOLVED | Noted: 2017-06-18 | Resolved: 2017-12-06

## 2017-12-06 PROBLEM — R79.1 SUBTHERAPEUTIC INTERNATIONAL NORMALIZED RATIO (INR): Status: RESOLVED | Noted: 2017-06-18 | Resolved: 2017-12-06

## 2017-12-06 PROCEDURE — 1036F TOBACCO NON-USER: CPT | Performed by: FAMILY MEDICINE

## 2017-12-06 PROCEDURE — G8598 ASA/ANTIPLAT THER USED: HCPCS | Performed by: FAMILY MEDICINE

## 2017-12-06 PROCEDURE — 90688 IIV4 VACCINE SPLT 0.5 ML IM: CPT | Performed by: FAMILY MEDICINE

## 2017-12-06 PROCEDURE — 3017F COLORECTAL CA SCREEN DOC REV: CPT | Performed by: FAMILY MEDICINE

## 2017-12-06 PROCEDURE — G8427 DOCREV CUR MEDS BY ELIG CLIN: HCPCS | Performed by: FAMILY MEDICINE

## 2017-12-06 PROCEDURE — G0008 ADMIN INFLUENZA VIRUS VAC: HCPCS | Performed by: FAMILY MEDICINE

## 2017-12-06 PROCEDURE — G8510 SCR DEP NEG, NO PLAN REQD: HCPCS | Performed by: FAMILY MEDICINE

## 2017-12-06 PROCEDURE — G8417 CALC BMI ABV UP PARAM F/U: HCPCS | Performed by: FAMILY MEDICINE

## 2017-12-06 PROCEDURE — 4040F PNEUMOC VAC/ADMIN/RCVD: CPT | Performed by: FAMILY MEDICINE

## 2017-12-06 PROCEDURE — 99214 OFFICE O/P EST MOD 30 MIN: CPT | Performed by: FAMILY MEDICINE

## 2017-12-06 PROCEDURE — G8484 FLU IMMUNIZE NO ADMIN: HCPCS | Performed by: FAMILY MEDICINE

## 2017-12-06 PROCEDURE — 3045F PR MOST RECENT HEMOGLOBIN A1C LEVEL 7.0-9.0%: CPT | Performed by: FAMILY MEDICINE

## 2017-12-06 PROCEDURE — 1123F ACP DISCUSS/DSCN MKR DOCD: CPT | Performed by: FAMILY MEDICINE

## 2017-12-06 ASSESSMENT — PATIENT HEALTH QUESTIONNAIRE - PHQ9
SUM OF ALL RESPONSES TO PHQ QUESTIONS 1-9: 0
SUM OF ALL RESPONSES TO PHQ9 QUESTIONS 1 & 2: 0
2. FEELING DOWN, DEPRESSED OR HOPELESS: 0
1. LITTLE INTEREST OR PLEASURE IN DOING THINGS: 0

## 2017-12-06 NOTE — PROGRESS NOTES
Vaccine Information Sheet, \"Influenza - Inactivated\"  given to Krysten Mccann, or parent/legal guardian of  Krysten Mccann and verbalized understanding. Patient responses:    Have you ever had a reaction to a flu vaccine? No  Are you able to eat eggs without adverse effects? Yes  Do you have any current illness? Yes  Have you ever had Guillian [place] Syndrome? No    Flu vaccine given per order. Please see immunization tab.

## 2017-12-20 ENCOUNTER — ANTI-COAG VISIT (OUTPATIENT)
Dept: PHARMACY | Facility: CLINIC | Age: 67
End: 2017-12-20

## 2017-12-20 LAB — INR BLD: 2.9

## 2017-12-20 NOTE — PROGRESS NOTES
is here for management of anticoagulation for AFib. PMH also significant for DM, Hyopthyroid, GERD, HTN, CAD, COPD. He presents today w/out complaint. Pt verifies dosing regimen as listed above. Pt denies s/s bleeding/bruising/swelling/SOB. No BRBPR. No melena. No missed doses  Reviewed pt medication list.  No changes in RX/OTCs/Herbal medications. Reviewed dietary concerns. Pt states that he does not pay attention to diet, including for his diabetes. INR 2.9 is within acceptable therapeutic range of 2-3. Recommend to continue 10 mg daily except 15 mg on Wed. Patient has 5 mg and 2 mg tablets. Will continue to monitor and check INR in 6 weeks, per pt request.  Dosing reminder card given with phone number, appointment date and time.    Return to clinic:  1/31 @ 10:00am

## 2017-12-27 ENCOUNTER — TELEPHONE (OUTPATIENT)
Dept: CARDIOLOGY CLINIC | Age: 67
End: 2017-12-27

## 2017-12-27 NOTE — TELEPHONE ENCOUNTER
[facility] requesting a call to the office. [facility] sent fax form (scanned into chart) wanting clarification on metoprolol, either 50 MG BID or 50 mg QD. Would like to clarify with patient what he has been taking. Refilled BID on 12/4/2017, Last office visit with Lesia Lanes has 25 mg QD.

## 2018-01-02 ENCOUNTER — OFFICE VISIT (OUTPATIENT)
Dept: FAMILY MEDICINE CLINIC | Age: 68
End: 2018-01-02

## 2018-01-02 VITALS
SYSTOLIC BLOOD PRESSURE: 112 MMHG | BODY MASS INDEX: 35.64 KG/M2 | WEIGHT: 248.38 LBS | OXYGEN SATURATION: 98 % | HEART RATE: 88 BPM | DIASTOLIC BLOOD PRESSURE: 76 MMHG

## 2018-01-02 DIAGNOSIS — G25.2 RESTING TREMOR: Primary | ICD-10-CM

## 2018-01-02 DIAGNOSIS — M77.01 GOLFER'S ELBOW, RIGHT: ICD-10-CM

## 2018-01-02 PROCEDURE — 99213 OFFICE O/P EST LOW 20 MIN: CPT | Performed by: FAMILY MEDICINE

## 2018-01-16 LAB
AVERAGE GLUCOSE: NORMAL
HBA1C MFR BLD: 6 %

## 2018-01-23 DIAGNOSIS — E11.42 DM TYPE 2 WITH DIABETIC PERIPHERAL NEUROPATHY (HCC): Primary | Chronic | ICD-10-CM

## 2018-01-31 ENCOUNTER — ANTI-COAG VISIT (OUTPATIENT)
Dept: PHARMACY | Facility: CLINIC | Age: 68
End: 2018-01-31

## 2018-01-31 LAB — INR BLD: 2.7

## 2018-01-31 NOTE — PROGRESS NOTES
is here for management of anticoagulation for AFib. PMH also significant for DM, Hyopthyroid, GERD, HTN, CAD, COPD. He presents today w/out complaint. Pt verifies dosing regimen as listed above. Pt denies s/s bleeding/bruising/swelling/SOB. No BRBPR. No melena. No missed doses  Reviewed pt medication list.  No changes in RX/OTCs/Herbal medications. Reviewed dietary concerns. Pt states that he does not pay attention to diet, including for his diabetes. INR 2.7 is within acceptable therapeutic range of 2-3. Recommend to continue 10 mg daily except 15 mg on Wed. Patient has 5 mg and 2 mg tablets. Will continue to monitor and check INR in 6 weeks, per pt request.  Dosing reminder card given with phone number, appointment date and time.    Return to clinic:  3/14 @ 10:00am

## 2018-02-13 ENCOUNTER — TELEPHONE (OUTPATIENT)
Dept: PULMONOLOGY | Age: 68
End: 2018-02-13

## 2018-02-13 NOTE — TELEPHONE ENCOUNTER
Patient cancelled appointment on 2/14/18 with Dr. Katlin Estrella for 6 mo fu.    Reason: No reason given    Patient did reschedule appointment. Appointment rescheduled for 5/3/18. Last OV 11/20/17    ASSESSMENT AND PLAN:     CLARISSE (Obstructive Sleep Apnea)   Patient has been non-compliant with CPAP for last several years. - patient not interested in trying again; discussed risks previously           Asthma with AE  - prednisone 40 mg x 5 days  Per hx. Patient states that change in weather is a trigger. PFTs did not show obstruction  - continue Advair, lower dose, continue albuterol prn  - control nasal symptoms with flonase        Cough    I favor either PND or cough variant asthma as etiology. Stable  - continue nasal ICS      Dyspnea    Based on the information available thus far, I favor a diagnosis of asthma or CAD.    -Stable        Inactive problems     Pulmonary nodules  - 4mm in LLL  - stable for 2 years, no further f/u needed

## 2018-02-22 ENCOUNTER — HOSPITAL ENCOUNTER (OUTPATIENT)
Dept: VASCULAR LAB | Age: 68
Discharge: HOME | End: 2018-02-22
Attending: PSYCHIATRY & NEUROLOGY | Admitting: PSYCHIATRY & NEUROLOGY

## 2018-02-22 DIAGNOSIS — I65.23 OCCLUSION AND STENOSIS OF BILATERAL CAROTID ARTERIES: ICD-10-CM

## 2018-03-14 ENCOUNTER — ANTI-COAG VISIT (OUTPATIENT)
Dept: PHARMACY | Facility: CLINIC | Age: 68
End: 2018-03-14

## 2018-03-14 LAB — INR BLD: 1.8

## 2018-04-03 RX ORDER — PANTOPRAZOLE SODIUM 40 MG/1
40 TABLET, DELAYED RELEASE ORAL DAILY
Qty: 90 TABLET | Refills: 0 | Status: SHIPPED | OUTPATIENT
Start: 2018-04-03 | End: 2018-06-22 | Stop reason: SDUPTHER

## 2018-04-25 ENCOUNTER — ANTI-COAG VISIT (OUTPATIENT)
Dept: PHARMACY | Facility: CLINIC | Age: 68
End: 2018-04-25

## 2018-04-25 LAB — INR BLD: 2.8

## 2018-05-03 ENCOUNTER — OFFICE VISIT (OUTPATIENT)
Dept: PULMONOLOGY | Age: 68
End: 2018-05-03

## 2018-05-03 VITALS
BODY MASS INDEX: 36.65 KG/M2 | OXYGEN SATURATION: 98 % | HEIGHT: 70 IN | WEIGHT: 256 LBS | SYSTOLIC BLOOD PRESSURE: 122 MMHG | HEART RATE: 78 BPM | TEMPERATURE: 98 F | DIASTOLIC BLOOD PRESSURE: 82 MMHG | RESPIRATION RATE: 16 BRPM

## 2018-05-03 DIAGNOSIS — R05.9 COUGH: ICD-10-CM

## 2018-05-03 DIAGNOSIS — G47.33 OSA ON CPAP: Primary | Chronic | ICD-10-CM

## 2018-05-03 DIAGNOSIS — Z99.89 OSA ON CPAP: Primary | Chronic | ICD-10-CM

## 2018-05-03 PROCEDURE — 1036F TOBACCO NON-USER: CPT | Performed by: INTERNAL MEDICINE

## 2018-05-03 PROCEDURE — 1123F ACP DISCUSS/DSCN MKR DOCD: CPT | Performed by: INTERNAL MEDICINE

## 2018-05-03 PROCEDURE — G8427 DOCREV CUR MEDS BY ELIG CLIN: HCPCS | Performed by: INTERNAL MEDICINE

## 2018-05-03 PROCEDURE — 3017F COLORECTAL CA SCREEN DOC REV: CPT | Performed by: INTERNAL MEDICINE

## 2018-05-03 PROCEDURE — 99214 OFFICE O/P EST MOD 30 MIN: CPT | Performed by: INTERNAL MEDICINE

## 2018-05-03 PROCEDURE — G8598 ASA/ANTIPLAT THER USED: HCPCS | Performed by: INTERNAL MEDICINE

## 2018-05-03 PROCEDURE — G8417 CALC BMI ABV UP PARAM F/U: HCPCS | Performed by: INTERNAL MEDICINE

## 2018-05-03 PROCEDURE — 4040F PNEUMOC VAC/ADMIN/RCVD: CPT | Performed by: INTERNAL MEDICINE

## 2018-05-03 RX ORDER — ALBUTEROL SULFATE 90 UG/1
2 AEROSOL, METERED RESPIRATORY (INHALATION) EVERY 6 HOURS PRN
Qty: 3 INHALER | Refills: 1 | Status: SHIPPED | OUTPATIENT
Start: 2018-05-03 | End: 2019-10-23 | Stop reason: SDUPTHER

## 2018-05-03 ASSESSMENT — SLEEP AND FATIGUE QUESTIONNAIRES
HOW LIKELY ARE YOU TO NOD OFF OR FALL ASLEEP WHILE SITTING INACTIVE IN A PUBLIC PLACE: 1
HOW LIKELY ARE YOU TO NOD OFF OR FALL ASLEEP WHILE WATCHING TV: 1
HOW LIKELY ARE YOU TO NOD OFF OR FALL ASLEEP WHILE SITTING AND READING: 2
HOW LIKELY ARE YOU TO NOD OFF OR FALL ASLEEP IN A CAR, WHILE STOPPED FOR A FEW MINUTES IN TRAFFIC: 1
HOW LIKELY ARE YOU TO NOD OFF OR FALL ASLEEP WHEN YOU ARE A PASSENGER IN A CAR FOR AN HOUR WITHOUT A BREAK: 2
HOW LIKELY ARE YOU TO NOD OFF OR FALL ASLEEP WHILE SITTING QUIETLY AFTER LUNCH WITHOUT ALCOHOL: 1
ESS TOTAL SCORE: 11
HOW LIKELY ARE YOU TO NOD OFF OR FALL ASLEEP WHILE LYING DOWN TO REST IN THE AFTERNOON WHEN CIRCUMSTANCES PERMIT: 3
HOW LIKELY ARE YOU TO NOD OFF OR FALL ASLEEP WHILE SITTING AND TALKING TO SOMEONE: 0
NECK CIRCUMFERENCE (INCHES): 19

## 2018-05-07 RX ORDER — WARFARIN SODIUM 5 MG/1
TABLET ORAL
Qty: 180 TABLET | Refills: 1 | Status: SHIPPED | OUTPATIENT
Start: 2018-05-07 | End: 2018-07-31 | Stop reason: SDUPTHER

## 2018-05-07 RX ORDER — DILTIAZEM HYDROCHLORIDE 360 MG/1
CAPSULE, EXTENDED RELEASE ORAL
Qty: 90 CAPSULE | Refills: 1 | Status: SHIPPED | OUTPATIENT
Start: 2018-05-07 | End: 2018-10-09 | Stop reason: SDUPTHER

## 2018-06-06 ENCOUNTER — ANTI-COAG VISIT (OUTPATIENT)
Dept: PHARMACY | Facility: CLINIC | Age: 68
End: 2018-06-06

## 2018-06-06 DIAGNOSIS — I48.20 CHRONIC ATRIAL FIBRILLATION (HCC): ICD-10-CM

## 2018-06-06 LAB — INR BLD: 2.8

## 2018-06-13 RX ORDER — LEVOTHYROXINE SODIUM 0.07 MG/1
TABLET ORAL
Qty: 90 TABLET | Refills: 0 | Status: SHIPPED | OUTPATIENT
Start: 2018-06-13 | End: 2018-09-01 | Stop reason: SDUPTHER

## 2018-06-18 ENCOUNTER — TELEPHONE (OUTPATIENT)
Dept: FAMILY MEDICINE CLINIC | Age: 68
End: 2018-06-18

## 2018-07-25 ENCOUNTER — ANTI-COAG VISIT (OUTPATIENT)
Dept: PHARMACY | Age: 68
End: 2018-07-25

## 2018-07-25 DIAGNOSIS — I48.20 CHRONIC ATRIAL FIBRILLATION (HCC): ICD-10-CM

## 2018-07-25 LAB — INR BLD: 2.6

## 2018-07-25 PROCEDURE — 85610 PROTHROMBIN TIME: CPT | Performed by: PHARMACIST

## 2018-07-25 PROCEDURE — 99211 OFF/OP EST MAY X REQ PHY/QHP: CPT | Performed by: PHARMACIST

## 2018-07-25 NOTE — PROGRESS NOTES
is here for management of anticoagulation for AFib. PMH also significant for DM, Hyopthyroid, GERD, HTN, CAD, COPD. He presents today w/out complaint. Pt verifies dosing regimen as listed above. Pt denies s/s bleeding/bruising/swelling/SOB. No BRBPR. No melena. No missed doses  Reviewed pt medication list.  No changes in RX/OTCs/Herbal medications. Reviewed dietary concerns. Pt states that he does not pay attention to diet, including for his diabetes. B vitamin complex added     No changes in medication or diet. No missed doses. No abnormal bruising or bleeding. INR 2.6 is within acceptable therapeutic range of 2-3. Recommend to continue 10 mg daily except 15 mg on Wed. Patient has 5 mg and 2 mg tablets. Will continue to monitor and check INR in 6 weeks, per pt request.  Dosing reminder card given with phone number, appointment date and time. Return to clinic: 9/5/18 at 10am    I have seen the patient and reviewed the progress note written by the PharmD Candidate. I agree with this assessment and plan.    Erwin Castañeda, [place] 7/25/2018 9:42 AM

## 2018-08-01 RX ORDER — WARFARIN SODIUM 5 MG/1
TABLET ORAL
Qty: 180 TABLET | Refills: 1 | Status: SHIPPED | OUTPATIENT
Start: 2018-08-01 | End: 2019-01-08

## 2018-08-24 ENCOUNTER — OFFICE VISIT (OUTPATIENT)
Dept: FAMILY MEDICINE CLINIC | Age: 68
End: 2018-08-24

## 2018-08-24 VITALS
HEART RATE: 67 BPM | TEMPERATURE: 98.4 F | WEIGHT: 253.8 LBS | SYSTOLIC BLOOD PRESSURE: 110 MMHG | BODY MASS INDEX: 36.42 KG/M2 | OXYGEN SATURATION: 97 % | DIASTOLIC BLOOD PRESSURE: 68 MMHG

## 2018-08-24 DIAGNOSIS — E03.9 ACQUIRED HYPOTHYROIDISM: Chronic | ICD-10-CM

## 2018-08-24 DIAGNOSIS — E11.40 DIABETIC NEUROPATHY, PAINFUL (HCC): Chronic | ICD-10-CM

## 2018-08-24 DIAGNOSIS — E11.42 DM TYPE 2 WITH DIABETIC PERIPHERAL NEUROPATHY (HCC): Primary | Chronic | ICD-10-CM

## 2018-08-24 DIAGNOSIS — G20 PARKINSON'S DISEASE (HCC): ICD-10-CM

## 2018-08-24 DIAGNOSIS — D49.9 NEOPLASM: ICD-10-CM

## 2018-08-24 DIAGNOSIS — R19.7 DIARRHEA, UNSPECIFIED TYPE: ICD-10-CM

## 2018-08-24 DIAGNOSIS — Z23 NEED FOR PNEUMOCOCCAL VACCINATION: ICD-10-CM

## 2018-08-24 DIAGNOSIS — I10 ESSENTIAL HYPERTENSION: Chronic | ICD-10-CM

## 2018-08-24 DIAGNOSIS — E11.43 DIABETIC GASTROPARESIS (HCC): ICD-10-CM

## 2018-08-24 DIAGNOSIS — K31.84 DIABETIC GASTROPARESIS (HCC): ICD-10-CM

## 2018-08-24 DIAGNOSIS — I48.20 CHRONIC ATRIAL FIBRILLATION (HCC): Chronic | ICD-10-CM

## 2018-08-24 PROBLEM — M25.522 PAIN OF BOTH ELBOWS: Status: RESOLVED | Noted: 2017-11-03 | Resolved: 2018-08-24

## 2018-08-24 PROBLEM — M25.521 PAIN OF BOTH ELBOWS: Status: RESOLVED | Noted: 2017-11-03 | Resolved: 2018-08-24

## 2018-08-24 PROBLEM — G20.A1 PARKINSON'S DISEASE: Status: ACTIVE | Noted: 2018-08-24

## 2018-08-24 PROBLEM — R55 SYNCOPE AND COLLAPSE: Status: RESOLVED | Noted: 2017-06-18 | Resolved: 2018-08-24

## 2018-08-24 PROCEDURE — 90670 PCV13 VACCINE IM: CPT | Performed by: FAMILY MEDICINE

## 2018-08-24 PROCEDURE — 3017F COLORECTAL CA SCREEN DOC REV: CPT | Performed by: FAMILY MEDICINE

## 2018-08-24 PROCEDURE — 1123F ACP DISCUSS/DSCN MKR DOCD: CPT | Performed by: FAMILY MEDICINE

## 2018-08-24 PROCEDURE — G8417 CALC BMI ABV UP PARAM F/U: HCPCS | Performed by: FAMILY MEDICINE

## 2018-08-24 PROCEDURE — G8427 DOCREV CUR MEDS BY ELIG CLIN: HCPCS | Performed by: FAMILY MEDICINE

## 2018-08-24 PROCEDURE — 99214 OFFICE O/P EST MOD 30 MIN: CPT | Performed by: FAMILY MEDICINE

## 2018-08-24 PROCEDURE — 3044F HG A1C LEVEL LT 7.0%: CPT | Performed by: FAMILY MEDICINE

## 2018-08-24 PROCEDURE — 1101F PT FALLS ASSESS-DOCD LE1/YR: CPT | Performed by: FAMILY MEDICINE

## 2018-08-24 PROCEDURE — 1036F TOBACCO NON-USER: CPT | Performed by: FAMILY MEDICINE

## 2018-08-24 PROCEDURE — 2022F DILAT RTA XM EVC RTNOPTHY: CPT | Performed by: FAMILY MEDICINE

## 2018-08-24 PROCEDURE — G8598 ASA/ANTIPLAT THER USED: HCPCS | Performed by: FAMILY MEDICINE

## 2018-08-24 PROCEDURE — G0009 ADMIN PNEUMOCOCCAL VACCINE: HCPCS | Performed by: FAMILY MEDICINE

## 2018-08-24 PROCEDURE — 4040F PNEUMOC VAC/ADMIN/RCVD: CPT | Performed by: FAMILY MEDICINE

## 2018-08-24 RX ORDER — DOXAZOSIN 2 MG/1
2 TABLET ORAL DAILY
Qty: 30 TABLET | Refills: 0
Start: 2018-08-24 | End: 2019-08-19 | Stop reason: DRUGHIGH

## 2018-08-24 RX ORDER — SILDENAFIL 100 MG/1
100 TABLET, FILM COATED ORAL PRN
Qty: 6 TABLET | Refills: 3 | Status: SHIPPED | OUTPATIENT
Start: 2018-08-24 | End: 2020-12-10

## 2018-08-24 NOTE — PROGRESS NOTES
Diabetic foot exam normal today with normal monofilament sensation testing noted. Dorsal pedal pulses palpable. No cyanosis noted of feet. No ulcerations of feet noted.
patient. Avoid tobacco products exposure. Follow up in 4 to 6mo. Call or return to office for any problems that develop before the next scheduled follow-up appointment. Duane Infante M.D. Parts of this note were completed using voice recognition transcription. Every effort was made to ensure accuracy; however, inadvertent transcription errors may be present.

## 2018-08-24 NOTE — PATIENT INSTRUCTIONS
Please read the healthy family handout that you were given and share it with your family. Please compare this printed medication list with your medications at home to be sure they are the same. If you have any medications that are different please contact us immediately at [phone]. Also review your allergies that we have listed, these may also include medications that you have not been able to tolerate, make sure everything listed is correct. If you have any allergies that are different please contact us immediately at [phone].

## 2018-08-30 ENCOUNTER — TELEPHONE (OUTPATIENT)
Dept: PRIMARY CARE CLINIC | Age: 68
End: 2018-08-30

## 2018-09-04 RX ORDER — LEVOTHYROXINE SODIUM 0.07 MG/1
TABLET ORAL
Qty: 90 TABLET | Refills: 1 | Status: SHIPPED | OUTPATIENT
Start: 2018-09-04 | End: 2019-01-17 | Stop reason: SDUPTHER

## 2018-09-10 ENCOUNTER — TELEPHONE (OUTPATIENT)
Dept: PHARMACY | Age: 68
End: 2018-09-10

## 2018-09-10 NOTE — TELEPHONE ENCOUNTER
Called to reschedule appointment with the coumadin clinic. No answer and the VM box was full, unable to leave message.   Georgina Neff, PharmD 9/10/2018 11:13 AM

## 2018-09-10 NOTE — TELEPHONE ENCOUNTER
I was able to get an APPROVAL for Sildenafil Citrate 100MG OR TABS through 09/07/2019. Please advise patient.

## 2018-09-14 RX ORDER — PANTOPRAZOLE SODIUM 40 MG/1
40 TABLET, DELAYED RELEASE ORAL DAILY
Qty: 90 TABLET | Refills: 1 | Status: SHIPPED | OUTPATIENT
Start: 2018-09-14 | End: 2019-01-17 | Stop reason: SDUPTHER

## 2018-09-17 ENCOUNTER — TELEPHONE (OUTPATIENT)
Dept: PHARMACY | Age: 68
End: 2018-09-17

## 2018-09-19 ENCOUNTER — ANTI-COAG VISIT (OUTPATIENT)
Dept: PHARMACY | Age: 68
End: 2018-09-19
Payer: MEDICARE

## 2018-09-19 DIAGNOSIS — I48.20 CHRONIC ATRIAL FIBRILLATION (HCC): ICD-10-CM

## 2018-09-19 LAB — INTERNATIONAL NORMALIZATION RATIO, POC: 2.4

## 2018-09-19 PROCEDURE — 85610 PROTHROMBIN TIME: CPT | Performed by: PHARMACIST

## 2018-09-19 PROCEDURE — 99211 OFF/OP EST MAY X REQ PHY/QHP: CPT | Performed by: PHARMACIST

## 2018-09-19 RX ORDER — PRAVASTATIN SODIUM 20 MG
TABLET ORAL
Qty: 90 TABLET | Refills: 1 | Status: SHIPPED | OUTPATIENT
Start: 2018-09-19 | End: 2019-01-17 | Stop reason: SDUPTHER

## 2018-09-19 NOTE — PROGRESS NOTES
is here for management of anticoagulation for AFib. PMH also significant for DM, Hyopthyroid, GERD, HTN, CAD, COPD. He presents today w/out complaint. Pt verifies dosing regimen as listed above. Pt denies s/s bleeding/bruising/swelling/SOB. No BRBPR. No melena. No missed doses  Reviewed pt medication list.  No changes in RX/OTCs/Herbal medications. Reviewed dietary concerns. Pt states that he does not pay attention to diet, including for his diabetes. B vitamin complex added     No changes in medication or diet. No missed doses. No abnormal bruising or bleeding. INR 2.4 is within acceptable therapeutic range of 2-3. Recommend to continue 10 mg daily except 15 mg on Wed. Patient has 5 mg and 2 mg tablets. Will continue to monitor and check INR in 6 weeks, per pt request.  Dosing reminder card given with phone number, appointment date and time.    Return to clinic: 10/31/18 at 10:15 am    Robin OrrD 10:16 AM 9/19/18

## 2018-10-10 RX ORDER — DILTIAZEM HYDROCHLORIDE 360 MG/1
CAPSULE, EXTENDED RELEASE ORAL
Qty: 90 CAPSULE | Refills: 1 | Status: SHIPPED | OUTPATIENT
Start: 2018-10-10 | End: 2019-04-22 | Stop reason: SDUPTHER

## 2018-10-31 ENCOUNTER — ANTI-COAG VISIT (OUTPATIENT)
Dept: PHARMACY | Age: 68
End: 2018-10-31
Payer: MEDICARE

## 2018-10-31 ENCOUNTER — TELEPHONE (OUTPATIENT)
Dept: PULMONOLOGY | Age: 68
End: 2018-10-31

## 2018-10-31 DIAGNOSIS — I48.20 CHRONIC ATRIAL FIBRILLATION (HCC): ICD-10-CM

## 2018-10-31 LAB — INR BLD: 2.7

## 2018-10-31 PROCEDURE — 99211 OFF/OP EST MAY X REQ PHY/QHP: CPT | Performed by: PHARMACIST

## 2018-10-31 PROCEDURE — 85610 PROTHROMBIN TIME: CPT | Performed by: PHARMACIST

## 2018-11-02 ENCOUNTER — HOSPITAL ENCOUNTER (OUTPATIENT)
Dept: GENERAL RADIOLOGY | Age: 68
Discharge: HOME | End: 2018-11-02
Payer: MEDICARE

## 2018-11-02 DIAGNOSIS — R14.0 ABDOMINAL DISTENSION: ICD-10-CM

## 2018-11-02 DIAGNOSIS — R10.12 LEFT UPPER QUADRANT PAIN: ICD-10-CM

## 2018-11-02 DIAGNOSIS — R10.13 DYSPEPSIA: ICD-10-CM

## 2018-11-02 DIAGNOSIS — R19.7 DIARRHEA, UNSPECIFIED TYPE: ICD-10-CM

## 2018-11-02 PROCEDURE — 74250 X-RAY XM SM INT 1CNTRST STD: CPT

## 2018-11-26 ENCOUNTER — OFFICE VISIT (OUTPATIENT)
Dept: PULMONOLOGY | Age: 68
End: 2018-11-26
Payer: COMMERCIAL

## 2018-11-26 VITALS
RESPIRATION RATE: 26 BRPM | OXYGEN SATURATION: 96 % | DIASTOLIC BLOOD PRESSURE: 78 MMHG | WEIGHT: 252 LBS | HEIGHT: 70 IN | TEMPERATURE: 97.9 F | BODY MASS INDEX: 36.08 KG/M2 | HEART RATE: 91 BPM | SYSTOLIC BLOOD PRESSURE: 128 MMHG

## 2018-11-26 DIAGNOSIS — G47.33 OSA ON CPAP: Chronic | ICD-10-CM

## 2018-11-26 DIAGNOSIS — J45.41 MODERATE PERSISTENT ASTHMA WITH ACUTE EXACERBATION: Primary | ICD-10-CM

## 2018-11-26 DIAGNOSIS — Z99.89 OSA ON CPAP: Chronic | ICD-10-CM

## 2018-11-26 PROCEDURE — G8598 ASA/ANTIPLAT THER USED: HCPCS | Performed by: INTERNAL MEDICINE

## 2018-11-26 PROCEDURE — 4040F PNEUMOC VAC/ADMIN/RCVD: CPT | Performed by: INTERNAL MEDICINE

## 2018-11-26 PROCEDURE — 1036F TOBACCO NON-USER: CPT | Performed by: INTERNAL MEDICINE

## 2018-11-26 PROCEDURE — G8427 DOCREV CUR MEDS BY ELIG CLIN: HCPCS | Performed by: INTERNAL MEDICINE

## 2018-11-26 PROCEDURE — 1123F ACP DISCUSS/DSCN MKR DOCD: CPT | Performed by: INTERNAL MEDICINE

## 2018-11-26 PROCEDURE — 3017F COLORECTAL CA SCREEN DOC REV: CPT | Performed by: INTERNAL MEDICINE

## 2018-11-26 PROCEDURE — 1101F PT FALLS ASSESS-DOCD LE1/YR: CPT | Performed by: INTERNAL MEDICINE

## 2018-11-26 PROCEDURE — G8482 FLU IMMUNIZE ORDER/ADMIN: HCPCS | Performed by: INTERNAL MEDICINE

## 2018-11-26 PROCEDURE — G8417 CALC BMI ABV UP PARAM F/U: HCPCS | Performed by: INTERNAL MEDICINE

## 2018-11-26 PROCEDURE — 99214 OFFICE O/P EST MOD 30 MIN: CPT | Performed by: INTERNAL MEDICINE

## 2018-11-26 RX ORDER — PREDNISONE 10 MG/1
TABLET ORAL
Qty: 30 TABLET | Refills: 0 | Status: SHIPPED | OUTPATIENT
Start: 2018-11-26 | End: 2018-12-08

## 2018-11-26 RX ORDER — DOXYCYCLINE HYCLATE 100 MG
100 TABLET ORAL 2 TIMES DAILY
Qty: 14 TABLET | Refills: 0 | Status: SHIPPED | OUTPATIENT
Start: 2018-11-26 | End: 2018-12-03

## 2018-11-29 ENCOUNTER — TELEPHONE (OUTPATIENT)
Dept: PULMONOLOGY | Age: 68
End: 2018-11-29

## 2018-11-29 DIAGNOSIS — J45.41 MODERATE PERSISTENT ASTHMA WITH ACUTE EXACERBATION: Primary | ICD-10-CM

## 2018-11-29 RX ORDER — ALBUTEROL SULFATE 2.5 MG/3ML
2.5 SOLUTION RESPIRATORY (INHALATION) EVERY 6 HOURS PRN
Qty: 120 EACH | Refills: 3 | Status: CANCELLED | OUTPATIENT
Start: 2018-11-29

## 2018-11-29 RX ORDER — IPRATROPIUM BROMIDE AND ALBUTEROL SULFATE 2.5; .5 MG/3ML; MG/3ML
1 SOLUTION RESPIRATORY (INHALATION) EVERY 6 HOURS
Qty: 1080 ML | Refills: 1 | Status: SHIPPED | OUTPATIENT
Start: 2018-11-29 | End: 2019-01-16 | Stop reason: SDUPTHER

## 2018-11-29 RX ORDER — IPRATROPIUM BROMIDE AND ALBUTEROL SULFATE 2.5; .5 MG/3ML; MG/3ML
1 SOLUTION RESPIRATORY (INHALATION) EVERY 6 HOURS
Qty: 1080 ML | Refills: 1 | OUTPATIENT
Start: 2018-11-29

## 2018-12-12 ENCOUNTER — ANTI-COAG VISIT (OUTPATIENT)
Dept: PHARMACY | Age: 68
End: 2018-12-12
Payer: MEDICARE

## 2018-12-12 DIAGNOSIS — I48.20 CHRONIC ATRIAL FIBRILLATION (HCC): ICD-10-CM

## 2018-12-12 LAB — INR BLD: 6.7

## 2018-12-12 PROCEDURE — 99211 OFF/OP EST MAY X REQ PHY/QHP: CPT | Performed by: FAMILY MEDICINE

## 2018-12-12 PROCEDURE — 85610 PROTHROMBIN TIME: CPT | Performed by: FAMILY MEDICINE

## 2018-12-15 ENCOUNTER — HOSPITAL ENCOUNTER (EMERGENCY)
Age: 68
Discharge: HOME | End: 2018-12-16
Attending: EMERGENCY MEDICINE
Payer: MEDICARE

## 2018-12-15 ENCOUNTER — APPOINTMENT (OUTPATIENT)
Dept: GENERAL RADIOLOGY | Age: 68
End: 2018-12-15
Payer: MEDICARE

## 2018-12-15 DIAGNOSIS — J44.1 COPD EXACERBATION (HCC): Primary | ICD-10-CM

## 2018-12-15 LAB
A/G RATIO: 1.1 (ref 1.1–2.2)
ALBUMIN SERPL-MCNC: 3.5 G/DL (ref 3.4–5)
ALP BLD-CCNC: 57 U/L (ref 40–129)
ALT SERPL-CCNC: 21 U/L (ref 10–40)
ANION GAP SERPL CALCULATED.3IONS-SCNC: 10 MMOL/L (ref 3–16)
AST SERPL-CCNC: 32 U/L (ref 15–37)
BASOPHILS ABSOLUTE: 0.1 K/UL (ref 0–0.2)
BASOPHILS RELATIVE PERCENT: 0.6 %
BILIRUB SERPL-MCNC: 0.4 MG/DL (ref 0–1)
BUN BLDV-MCNC: 16 MG/DL (ref 7–20)
CALCIUM SERPL-MCNC: 8.8 MG/DL (ref 8.3–10.6)
CHLORIDE BLD-SCNC: 109 MMOL/L (ref 99–110)
CO2: 22 MMOL/L (ref 21–32)
CREAT SERPL-MCNC: 1.2 MG/DL (ref 0.8–1.3)
EKG ATRIAL RATE: 416 BPM
EKG DIAGNOSIS: NORMAL
EKG Q-T INTERVAL: 390 MS
EKG QRS DURATION: 96 MS
EKG QTC CALCULATION (BAZETT): 408 MS
EKG R AXIS: -66 DEGREES
EKG T AXIS: 47 DEGREES
EKG VENTRICULAR RATE: 66 BPM
EOSINOPHILS ABSOLUTE: 0.8 K/UL (ref 0–0.6)
EOSINOPHILS RELATIVE PERCENT: 5.7 %
GFR AFRICAN AMERICAN: >60
GFR NON-AFRICAN AMERICAN: >60
GLOBULIN: 3.1 G/DL
GLUCOSE BLD-MCNC: 115 MG/DL (ref 70–99)
HCT VFR BLD CALC: 46.2 % (ref 40.5–52.5)
HEMOGLOBIN: 15.4 G/DL (ref 13.5–17.5)
LYMPHOCYTES ABSOLUTE: 3.1 K/UL (ref 1–5.1)
LYMPHOCYTES RELATIVE PERCENT: 21.7 %
MCH RBC QN AUTO: 30.6 PG (ref 26–34)
MCHC RBC AUTO-ENTMCNC: 33.4 G/DL (ref 31–36)
MCV RBC AUTO: 91.4 FL (ref 80–100)
MONOCYTES ABSOLUTE: 0.8 K/UL (ref 0–1.3)
MONOCYTES RELATIVE PERCENT: 5.4 %
NEUTROPHILS ABSOLUTE: 9.7 K/UL (ref 1.7–7.7)
NEUTROPHILS RELATIVE PERCENT: 66.6 %
PDW BLD-RTO: 14.2 % (ref 12.4–15.4)
PLATELET # BLD: 201 K/UL (ref 135–450)
PMV BLD AUTO: 8.1 FL (ref 5–10.5)
POTASSIUM SERPL-SCNC: 4.3 MMOL/L (ref 3.5–5.1)
RBC # BLD: 5.05 M/UL (ref 4.2–5.9)
SODIUM BLD-SCNC: 141 MMOL/L (ref 136–145)
TOTAL PROTEIN: 6.6 G/DL (ref 6.4–8.2)
WBC # BLD: 14.5 K/UL (ref 4–11)

## 2018-12-15 PROCEDURE — 83880 ASSAY OF NATRIURETIC PEPTIDE: CPT

## 2018-12-15 PROCEDURE — 93010 ELECTROCARDIOGRAM REPORT: CPT | Performed by: INTERNAL MEDICINE

## 2018-12-15 PROCEDURE — 80053 COMPREHEN METABOLIC PANEL: CPT

## 2018-12-15 PROCEDURE — 85025 COMPLETE CBC W/AUTO DIFF WBC: CPT

## 2018-12-15 PROCEDURE — 6360000002 HC RX W HCPCS: Performed by: EMERGENCY MEDICINE

## 2018-12-15 PROCEDURE — 6370000000 HC RX 637 (ALT 250 FOR IP): Performed by: EMERGENCY MEDICINE

## 2018-12-15 PROCEDURE — 99285 EMERGENCY DEPT VISIT HI MDM: CPT

## 2018-12-15 PROCEDURE — 93005 ELECTROCARDIOGRAM TRACING: CPT | Performed by: EMERGENCY MEDICINE

## 2018-12-15 PROCEDURE — 71046 X-RAY EXAM CHEST 2 VIEWS: CPT

## 2018-12-15 PROCEDURE — 96374 THER/PROPH/DIAG INJ IV PUSH: CPT

## 2018-12-15 RX ORDER — IPRATROPIUM BROMIDE AND ALBUTEROL SULFATE 2.5; .5 MG/3ML; MG/3ML
1 SOLUTION RESPIRATORY (INHALATION) ONCE
Status: COMPLETED | OUTPATIENT
Start: 2018-12-16 | End: 2018-12-15

## 2018-12-15 RX ORDER — METHYLPREDNISOLONE SODIUM SUCCINATE 125 MG/2ML
125 INJECTION, POWDER, LYOPHILIZED, FOR SOLUTION INTRAMUSCULAR; INTRAVENOUS ONCE
Status: COMPLETED | OUTPATIENT
Start: 2018-12-15 | End: 2018-12-15

## 2018-12-15 RX ORDER — PREDNISONE 20 MG/1
60 TABLET ORAL ONCE
Status: COMPLETED | OUTPATIENT
Start: 2018-12-15 | End: 2018-12-15

## 2018-12-15 RX ORDER — IPRATROPIUM BROMIDE AND ALBUTEROL SULFATE 2.5; .5 MG/3ML; MG/3ML
1 SOLUTION RESPIRATORY (INHALATION) ONCE
Status: COMPLETED | OUTPATIENT
Start: 2018-12-15 | End: 2018-12-15

## 2018-12-15 RX ADMIN — METHYLPREDNISOLONE SODIUM SUCCINATE 125 MG: 125 INJECTION, POWDER, FOR SOLUTION INTRAMUSCULAR; INTRAVENOUS at 22:47

## 2018-12-15 RX ADMIN — PREDNISONE 60 MG: 20 TABLET ORAL at 22:46

## 2018-12-15 RX ADMIN — IPRATROPIUM BROMIDE AND ALBUTEROL SULFATE 1 AMPULE: .5; 3 SOLUTION RESPIRATORY (INHALATION) at 23:55

## 2018-12-15 RX ADMIN — IPRATROPIUM BROMIDE AND ALBUTEROL SULFATE 1 AMPULE: .5; 3 SOLUTION RESPIRATORY (INHALATION) at 22:46

## 2018-12-15 ASSESSMENT — PAIN SCALES - GENERAL: PAINLEVEL_OUTOF10: 8

## 2018-12-15 ASSESSMENT — PAIN DESCRIPTION - LOCATION: LOCATION: CHEST

## 2018-12-15 ASSESSMENT — PAIN DESCRIPTION - PAIN TYPE: TYPE: CHRONIC PAIN

## 2018-12-16 VITALS
DIASTOLIC BLOOD PRESSURE: 92 MMHG | SYSTOLIC BLOOD PRESSURE: 151 MMHG | BODY MASS INDEX: 34.07 KG/M2 | HEART RATE: 88 BPM | TEMPERATURE: 98.3 F | OXYGEN SATURATION: 95 % | WEIGHT: 238 LBS | RESPIRATION RATE: 18 BRPM | HEIGHT: 70 IN

## 2018-12-16 LAB — PRO-BNP: 264 PG/ML (ref 0–124)

## 2018-12-16 RX ORDER — PREDNISONE 20 MG/1
TABLET ORAL
Qty: 27 TABLET | Refills: 0 | Status: SHIPPED | OUTPATIENT
Start: 2018-12-16 | End: 2019-01-08

## 2018-12-16 RX ORDER — IPRATROPIUM BROMIDE AND ALBUTEROL SULFATE 2.5; .5 MG/3ML; MG/3ML
1 SOLUTION RESPIRATORY (INHALATION) EVERY 4 HOURS
Qty: 360 ML | Refills: 0 | Status: SHIPPED | OUTPATIENT
Start: 2018-12-16 | End: 2019-01-16 | Stop reason: SDUPTHER

## 2018-12-16 NOTE — ED NOTES
Pt comfortable lying on stretcher. Spouse at the bedside. VSS. Denies any needs at this time.       Baldo [facility]  12/15/18 2184

## 2018-12-16 NOTE — ED NOTES
Discharge instructions reviewed with pt. Pt verbalized understanding. Pt ambulated out of ED in stable condition.       Keiry Danielle, [facility]  12/16/18 2816

## 2018-12-16 NOTE — ED PROVIDER NOTES
Laura      fluticasone (FLONASE) 50 MCG/ACT nasal spray 2 sprays by Nasal route daily 3 Bottle 1    Insulin Pen Needle (NOVOFINE) 30G X 8 MM MISC APPLY 1 EACH TOPICALLY 2 TIMES DAILY. 300 each 2    Cholecalciferol (VITAMIN D) 2000 UNITS CAPS capsule Take  by mouth.  nystatin (MYCOSTATIN) powder Apply 3 times daily. 1 Bottle 5     Allergies   Allergen Reactions    Lyrica [Pregabalin] Swelling    Simvastatin      Increased CPK       Nursing Notes Reviewed    Physical Exam:  ED Triage Vitals   BP Temp Temp Source Pulse Resp SpO2 Height Weight   12/15/18 2202 12/15/18 2202 12/15/18 2202 12/15/18 2202 12/15/18 2251 12/15/18 2202 12/15/18 2202 12/15/18 2202   120/76 98.3 °F (36.8 °C) Oral 92 20 95 % 5' 10\" (1.778 m) 238 lb (108 kg)     GENERAL APPEARANCE: Awake and alert. Cooperative. No acute distress. HEAD:Normocephalic. Atraumatic. EYES: EOM's grossly intact. Sclera anicteric. ENT: Mucous membranes are moist. Tolerates saliva. No trismus. NECK: Supple. No meningismus. Trachea midline. HEART: RRR. LUNGS: Decreased percent bilaterally with inspiratory and expiratory wheezing. ABDOMEN: Soft. Non-tender. No guarding or rebound. EXTREMITIES: No acute deformities. SKIN: Warm and dry. NEUROLOGICAL: No gross facial drooping. Moves all 4 extremities spontaneously.     Physical Exam    I have reviewed and interpreted all of the currently availablelab results from this visit (if applicable):  Results for orders placed or performed during the hospital encounter of 12/15/18   CBC Auto Differential   Result Value Ref Range    WBC 14.5 (H) 4.0 - 11.0 K/uL    RBC 5.05 4.20 - 5.90 M/uL    Hemoglobin 15.4 13.5 - 17.5 g/dL    Hematocrit 46.2 40.5 - 52.5 %    MCV 91.4 80.0 - 100.0 fL    MCH 30.6 26.0 - 34.0 pg    MCHC 33.4 31.0 - 36.0 g/dL    RDW 14.2 12.4 - 15.4 %    Platelets 602 566 - 863 K/uL    MPV 8.1 5.0 - 10.5 fL    Neutrophils % 66.6 %    Lymphocytes % 21.7 %    Monocytes % 5.4 %    Eosinophils % 5.7 %

## 2018-12-17 ENCOUNTER — ANTI-COAG VISIT (OUTPATIENT)
Dept: PHARMACY | Age: 68
End: 2018-12-17
Payer: MEDICARE

## 2018-12-17 DIAGNOSIS — I48.20 CHRONIC ATRIAL FIBRILLATION (HCC): ICD-10-CM

## 2018-12-17 LAB — INTERNATIONAL NORMALIZATION RATIO, POC: 1.3

## 2018-12-17 PROCEDURE — 99211 OFF/OP EST MAY X REQ PHY/QHP: CPT | Performed by: PHARMACIST

## 2018-12-17 PROCEDURE — 85610 PROTHROMBIN TIME: CPT | Performed by: PHARMACIST

## 2018-12-17 NOTE — PROGRESS NOTES
is here for management of anticoagulation for AFib. PMH also significant for DM, Hyopthyroid, GERD, HTN, CAD, COPD. He presents today w/out complaint. Pt verifies dosing regimen as listed above. Pt denies s/s bleeding/bruising/swelling/SOB. No BRBPR. No melena. No missed doses. Reviewed pt medication list.  No changes in RX/OTC/herbal medications. Reviewed dietary concerns. Pt states that he does not pay attention to diet, including for his diabetes. Patient had very high INR last week. He denies any changes. He had recently finished a course of steroids and doxycyline prior to that visit? Currently on prednisone taper for COPD. 60 mg daily for 6 days, ten 40 mg for 3 days, then 20 mg for 3 days. Started yesterday. Due to recent high INR and current steroid taper, will lower dose for this week by about 13%, then recheck next week, shoud be near end of steroid taper at next visit and can try to return to usual dose. INR 1.3 is below the acceptable therapeutic range of 2-3. Due to holding 3 doses last week. Recommend to take 10 mg daily except 5 mg on Fri. Patient has 5 mg and 2 mg tablets. Will continue to monitor and check INR in 9 days (usually 6 weeks per pt request). Dosing reminder card given with phone number, appointment date and time.   Return to clinic:  12/26 @ 10 am     Kayden Loco PharmD 10:07 AM 12/17/18

## 2018-12-18 ENCOUNTER — HOSPITAL ENCOUNTER (OUTPATIENT)
Age: 68
Discharge: HOME | End: 2018-12-18
Payer: MEDICARE

## 2018-12-18 PROCEDURE — 83013 H PYLORI (C-13) BREATH: CPT

## 2018-12-20 LAB — H PYLORI BREATH TEST: NEGATIVE

## 2018-12-26 ENCOUNTER — ANTI-COAG VISIT (OUTPATIENT)
Dept: PHARMACY | Age: 68
End: 2018-12-26
Payer: MEDICARE

## 2018-12-26 DIAGNOSIS — I48.20 CHRONIC ATRIAL FIBRILLATION (HCC): ICD-10-CM

## 2018-12-26 LAB — INTERNATIONAL NORMALIZATION RATIO, POC: 8

## 2018-12-26 PROCEDURE — 85610 PROTHROMBIN TIME: CPT | Performed by: PHARMACIST

## 2018-12-26 PROCEDURE — 99211 OFF/OP EST MAY X REQ PHY/QHP: CPT | Performed by: PHARMACIST

## 2018-12-28 LAB
BASOPHILS ABSOLUTE: NORMAL /ΜL
BASOPHILS RELATIVE PERCENT: NORMAL %
EOSINOPHILS ABSOLUTE: NORMAL /ΜL
EOSINOPHILS RELATIVE PERCENT: NORMAL %
HCT VFR BLD CALC: 51.8 % (ref 41–53)
HEMOGLOBIN: 17.3 G/DL (ref 13.5–17.5)
LYMPHOCYTES ABSOLUTE: NORMAL /ΜL
LYMPHOCYTES RELATIVE PERCENT: NORMAL %
MCH RBC QN AUTO: 31.2 PG
MCHC RBC AUTO-ENTMCNC: 33.4 G/DL
MCV RBC AUTO: 94 FL
MONOCYTES ABSOLUTE: NORMAL /ΜL
MONOCYTES RELATIVE PERCENT: NORMAL %
NEUTROPHILS ABSOLUTE: NORMAL /ΜL
NEUTROPHILS RELATIVE PERCENT: NORMAL %
PDW BLD-RTO: NORMAL %
PLATELET # BLD: 219 K/ΜL
PMV BLD AUTO: NORMAL FL
RBC # BLD: 5.54 10^6/ΜL
WBC # BLD: 21.4 10^3/ML

## 2019-01-02 ENCOUNTER — APPOINTMENT (OUTPATIENT)
Dept: CT IMAGING | Age: 69
End: 2019-01-02
Payer: COMMERCIAL

## 2019-01-02 ENCOUNTER — HOSPITAL ENCOUNTER (EMERGENCY)
Age: 69
Discharge: HOME | End: 2019-01-02
Attending: EMERGENCY MEDICINE
Payer: COMMERCIAL

## 2019-01-02 ENCOUNTER — APPOINTMENT (OUTPATIENT)
Dept: GENERAL RADIOLOGY | Age: 69
End: 2019-01-02
Payer: COMMERCIAL

## 2019-01-02 VITALS
RESPIRATION RATE: 13 BRPM | WEIGHT: 240 LBS | BODY MASS INDEX: 35.55 KG/M2 | OXYGEN SATURATION: 100 % | TEMPERATURE: 98.5 F | HEIGHT: 69 IN | SYSTOLIC BLOOD PRESSURE: 108 MMHG | HEART RATE: 97 BPM | DIASTOLIC BLOOD PRESSURE: 67 MMHG

## 2019-01-02 DIAGNOSIS — R07.9 CHEST PAIN, UNSPECIFIED TYPE: Primary | ICD-10-CM

## 2019-01-02 LAB
A/G RATIO: 0.8 (ref 1.1–2.2)
ALBUMIN SERPL-MCNC: 2.8 G/DL (ref 3.4–5)
ALP BLD-CCNC: 70 U/L (ref 40–129)
ALT SERPL-CCNC: 28 U/L (ref 10–40)
ANION GAP SERPL CALCULATED.3IONS-SCNC: 9 MMOL/L (ref 3–16)
AST SERPL-CCNC: 35 U/L (ref 15–37)
BASOPHILS ABSOLUTE: 0 K/UL (ref 0–0.2)
BASOPHILS RELATIVE PERCENT: 0 %
BILIRUB SERPL-MCNC: <0.2 MG/DL (ref 0–1)
BUN BLDV-MCNC: 16 MG/DL (ref 7–20)
CALCIUM SERPL-MCNC: 8.7 MG/DL (ref 8.3–10.6)
CHLORIDE BLD-SCNC: 99 MMOL/L (ref 99–110)
CHP ED QC CHECK: YES
CO2: 25 MMOL/L (ref 21–32)
CREAT SERPL-MCNC: 0.9 MG/DL (ref 0.8–1.3)
EKG ATRIAL RATE: 326 BPM
EKG DIAGNOSIS: NORMAL
EKG Q-T INTERVAL: 346 MS
EKG QRS DURATION: 96 MS
EKG QTC CALCULATION (BAZETT): 434 MS
EKG R AXIS: -77 DEGREES
EKG T AXIS: 54 DEGREES
EKG VENTRICULAR RATE: 95 BPM
EOSINOPHILS ABSOLUTE: 0.3 K/UL (ref 0–0.6)
EOSINOPHILS RELATIVE PERCENT: 2 %
GFR AFRICAN AMERICAN: >60
GFR NON-AFRICAN AMERICAN: >60
GLOBULIN: 3.3 G/DL
GLUCOSE BLD-MCNC: 47 MG/DL (ref 70–99)
GLUCOSE BLD-MCNC: 58 MG/DL
GLUCOSE BLD-MCNC: 58 MG/DL (ref 70–99)
GLUCOSE BLD-MCNC: 85 MG/DL (ref 70–99)
HCT VFR BLD CALC: 51.4 % (ref 40.5–52.5)
HEMOGLOBIN: 17.7 G/DL (ref 13.5–17.5)
INR BLD: 1.09 (ref 0.86–1.14)
LACTIC ACID: 1.2 MMOL/L (ref 0.4–2)
LIPASE: 27 U/L (ref 13–60)
LYMPHOCYTES ABSOLUTE: 3.7 K/UL (ref 1–5.1)
LYMPHOCYTES RELATIVE PERCENT: 26 %
MCH RBC QN AUTO: 32.2 PG (ref 26–34)
MCHC RBC AUTO-ENTMCNC: 34.3 G/DL (ref 31–36)
MCV RBC AUTO: 93.7 FL (ref 80–100)
METAMYELOCYTES RELATIVE PERCENT: 1 %
MONOCYTES ABSOLUTE: 0.3 K/UL (ref 0–1.3)
MONOCYTES RELATIVE PERCENT: 2 %
MYELOCYTE PERCENT: 1 %
NEUTROPHILS ABSOLUTE: 10.1 K/UL (ref 1.7–7.7)
NEUTROPHILS RELATIVE PERCENT: 68 %
PDW BLD-RTO: 15 % (ref 12.4–15.4)
PERFORMED ON: ABNORMAL
PERFORMED ON: NORMAL
PLATELET # BLD: 197 K/UL (ref 135–450)
PLATELET SLIDE REVIEW: ADEQUATE
PMV BLD AUTO: 7.9 FL (ref 5–10.5)
POTASSIUM SERPL-SCNC: 4.6 MMOL/L (ref 3.5–5.1)
PROTHROMBIN TIME: 12.4 SEC (ref 9.8–13)
RBC # BLD: 5.49 M/UL (ref 4.2–5.9)
RBC # BLD: NORMAL 10*6/UL
SLIDE REVIEW: ABNORMAL
SODIUM BLD-SCNC: 133 MMOL/L (ref 136–145)
TOTAL PROTEIN: 6.1 G/DL (ref 6.4–8.2)
TROPONIN: <0.01 NG/ML
WBC # BLD: 14.4 K/UL (ref 4–11)

## 2019-01-02 PROCEDURE — 80053 COMPREHEN METABOLIC PANEL: CPT

## 2019-01-02 PROCEDURE — 83690 ASSAY OF LIPASE: CPT

## 2019-01-02 PROCEDURE — 96374 THER/PROPH/DIAG INJ IV PUSH: CPT

## 2019-01-02 PROCEDURE — 93010 ELECTROCARDIOGRAM REPORT: CPT | Performed by: INTERNAL MEDICINE

## 2019-01-02 PROCEDURE — 99285 EMERGENCY DEPT VISIT HI MDM: CPT

## 2019-01-02 PROCEDURE — 71045 X-RAY EXAM CHEST 1 VIEW: CPT

## 2019-01-02 PROCEDURE — 71260 CT THORAX DX C+: CPT

## 2019-01-02 PROCEDURE — 85610 PROTHROMBIN TIME: CPT

## 2019-01-02 PROCEDURE — 6360000004 HC RX CONTRAST MEDICATION: Performed by: EMERGENCY MEDICINE

## 2019-01-02 PROCEDURE — 93005 ELECTROCARDIOGRAM TRACING: CPT | Performed by: EMERGENCY MEDICINE

## 2019-01-02 PROCEDURE — 85025 COMPLETE CBC W/AUTO DIFF WBC: CPT

## 2019-01-02 PROCEDURE — 83605 ASSAY OF LACTIC ACID: CPT

## 2019-01-02 PROCEDURE — 6360000002 HC RX W HCPCS: Performed by: EMERGENCY MEDICINE

## 2019-01-02 PROCEDURE — 84484 ASSAY OF TROPONIN QUANT: CPT

## 2019-01-02 RX ORDER — WARFARIN SODIUM 5 MG/1
5 TABLET ORAL
COMMUNITY
End: 2019-06-03 | Stop reason: SDUPTHER

## 2019-01-02 RX ORDER — KETOROLAC TROMETHAMINE 10 MG/1
10 TABLET, FILM COATED ORAL 3 TIMES DAILY
Qty: 12 TABLET | Refills: 0 | Status: SHIPPED | OUTPATIENT
Start: 2019-01-02 | End: 2019-01-08

## 2019-01-02 RX ORDER — KETOROLAC TROMETHAMINE 30 MG/ML
30 INJECTION, SOLUTION INTRAMUSCULAR; INTRAVENOUS ONCE
Status: COMPLETED | OUTPATIENT
Start: 2019-01-02 | End: 2019-01-02

## 2019-01-02 RX ADMIN — IOPAMIDOL 85 ML: 755 INJECTION, SOLUTION INTRAVENOUS at 03:53

## 2019-01-02 RX ADMIN — KETOROLAC TROMETHAMINE 30 MG: 30 INJECTION, SOLUTION INTRAMUSCULAR at 03:02

## 2019-01-02 ASSESSMENT — PAIN DESCRIPTION - PAIN TYPE: TYPE: ACUTE PAIN

## 2019-01-02 ASSESSMENT — PAIN DESCRIPTION - FREQUENCY: FREQUENCY: CONTINUOUS

## 2019-01-02 ASSESSMENT — PAIN SCALES - GENERAL
PAINLEVEL_OUTOF10: 7
PAINLEVEL_OUTOF10: 7

## 2019-01-02 ASSESSMENT — PAIN DESCRIPTION - LOCATION: LOCATION: CHEST

## 2019-01-02 ASSESSMENT — PAIN DESCRIPTION - DESCRIPTORS: DESCRIPTORS: DULL

## 2019-01-02 ASSESSMENT — HEART SCORE: ECG: 0

## 2019-01-02 ASSESSMENT — PAIN DESCRIPTION - ORIENTATION: ORIENTATION: MID

## 2019-01-03 ENCOUNTER — OFFICE VISIT (OUTPATIENT)
Dept: FAMILY MEDICINE CLINIC | Age: 69
End: 2019-01-03
Payer: COMMERCIAL

## 2019-01-03 VITALS
DIASTOLIC BLOOD PRESSURE: 69 MMHG | SYSTOLIC BLOOD PRESSURE: 117 MMHG | HEART RATE: 69 BPM | OXYGEN SATURATION: 96 % | TEMPERATURE: 98.3 F | BODY MASS INDEX: 36.33 KG/M2 | WEIGHT: 246 LBS

## 2019-01-03 DIAGNOSIS — E03.9 ACQUIRED HYPOTHYROIDISM: Chronic | ICD-10-CM

## 2019-01-03 DIAGNOSIS — E11.42 DM TYPE 2 WITH DIABETIC PERIPHERAL NEUROPATHY (HCC): Primary | Chronic | ICD-10-CM

## 2019-01-03 DIAGNOSIS — I10 ESSENTIAL HYPERTENSION: Chronic | ICD-10-CM

## 2019-01-03 DIAGNOSIS — E78.2 MIXED HYPERLIPIDEMIA: Chronic | ICD-10-CM

## 2019-01-03 DIAGNOSIS — E11.43 DIABETIC GASTROPARESIS (HCC): ICD-10-CM

## 2019-01-03 DIAGNOSIS — K31.84 DIABETIC GASTROPARESIS (HCC): ICD-10-CM

## 2019-01-03 DIAGNOSIS — D72.828 OTHER ELEVATED WHITE BLOOD CELL (WBC) COUNT: Primary | ICD-10-CM

## 2019-01-03 DIAGNOSIS — D72.828 OTHER ELEVATED WHITE BLOOD CELL (WBC) COUNT: ICD-10-CM

## 2019-01-03 PROCEDURE — G8427 DOCREV CUR MEDS BY ELIG CLIN: HCPCS | Performed by: FAMILY MEDICINE

## 2019-01-03 PROCEDURE — 3046F HEMOGLOBIN A1C LEVEL >9.0%: CPT | Performed by: FAMILY MEDICINE

## 2019-01-03 PROCEDURE — 1036F TOBACCO NON-USER: CPT | Performed by: FAMILY MEDICINE

## 2019-01-03 PROCEDURE — 1101F PT FALLS ASSESS-DOCD LE1/YR: CPT | Performed by: FAMILY MEDICINE

## 2019-01-03 PROCEDURE — 1123F ACP DISCUSS/DSCN MKR DOCD: CPT | Performed by: FAMILY MEDICINE

## 2019-01-03 PROCEDURE — 3017F COLORECTAL CA SCREEN DOC REV: CPT | Performed by: FAMILY MEDICINE

## 2019-01-03 PROCEDURE — 4040F PNEUMOC VAC/ADMIN/RCVD: CPT | Performed by: FAMILY MEDICINE

## 2019-01-03 PROCEDURE — 99214 OFFICE O/P EST MOD 30 MIN: CPT | Performed by: FAMILY MEDICINE

## 2019-01-03 PROCEDURE — G8417 CALC BMI ABV UP PARAM F/U: HCPCS | Performed by: FAMILY MEDICINE

## 2019-01-03 PROCEDURE — 2022F DILAT RTA XM EVC RTNOPTHY: CPT | Performed by: FAMILY MEDICINE

## 2019-01-03 PROCEDURE — G8482 FLU IMMUNIZE ORDER/ADMIN: HCPCS | Performed by: FAMILY MEDICINE

## 2019-01-03 ASSESSMENT — PATIENT HEALTH QUESTIONNAIRE - PHQ9
1. LITTLE INTEREST OR PLEASURE IN DOING THINGS: 0
SUM OF ALL RESPONSES TO PHQ QUESTIONS 1-9: 0
2. FEELING DOWN, DEPRESSED OR HOPELESS: 0
SUM OF ALL RESPONSES TO PHQ9 QUESTIONS 1 & 2: 0
SUM OF ALL RESPONSES TO PHQ QUESTIONS 1-9: 0

## 2019-01-08 ENCOUNTER — APPOINTMENT (OUTPATIENT)
Dept: CT IMAGING | Age: 69
End: 2019-01-08
Payer: COMMERCIAL

## 2019-01-08 ENCOUNTER — HOSPITAL ENCOUNTER (EMERGENCY)
Age: 69
Discharge: HOME | End: 2019-01-08
Attending: EMERGENCY MEDICINE
Payer: COMMERCIAL

## 2019-01-08 ENCOUNTER — APPOINTMENT (OUTPATIENT)
Dept: GENERAL RADIOLOGY | Age: 69
End: 2019-01-08
Payer: COMMERCIAL

## 2019-01-08 ENCOUNTER — OFFICE VISIT (OUTPATIENT)
Dept: FAMILY MEDICINE CLINIC | Age: 69
End: 2019-01-08
Payer: COMMERCIAL

## 2019-01-08 VITALS
WEIGHT: 244.2 LBS | DIASTOLIC BLOOD PRESSURE: 74 MMHG | BODY MASS INDEX: 36.06 KG/M2 | TEMPERATURE: 97.6 F | SYSTOLIC BLOOD PRESSURE: 106 MMHG | OXYGEN SATURATION: 95 %

## 2019-01-08 VITALS
HEART RATE: 67 BPM | RESPIRATION RATE: 15 BRPM | HEIGHT: 69 IN | SYSTOLIC BLOOD PRESSURE: 135 MMHG | OXYGEN SATURATION: 97 % | TEMPERATURE: 98.1 F | WEIGHT: 240 LBS | BODY MASS INDEX: 35.55 KG/M2 | DIASTOLIC BLOOD PRESSURE: 72 MMHG

## 2019-01-08 DIAGNOSIS — R42 DIZZINESS: ICD-10-CM

## 2019-01-08 DIAGNOSIS — H61.21 IMPACTED CERUMEN OF RIGHT EAR: Primary | ICD-10-CM

## 2019-01-08 DIAGNOSIS — R79.1 ELEVATED INR: ICD-10-CM

## 2019-01-08 DIAGNOSIS — R26.81 UNSTEADY GAIT: Primary | ICD-10-CM

## 2019-01-08 DIAGNOSIS — H53.9 VISION CHANGES: ICD-10-CM

## 2019-01-08 DIAGNOSIS — I48.20 CHRONIC ATRIAL FIBRILLATION (HCC): Chronic | ICD-10-CM

## 2019-01-08 LAB
A/G RATIO: 1.3 (ref 1.1–2.2)
ALBUMIN SERPL-MCNC: 3.7 G/DL (ref 3.4–5)
ALP BLD-CCNC: 61 U/L (ref 40–129)
ALT SERPL-CCNC: 19 U/L (ref 10–40)
ANION GAP SERPL CALCULATED.3IONS-SCNC: 13 MMOL/L (ref 3–16)
APTT: 49.6 SEC (ref 26–36)
AST SERPL-CCNC: 16 U/L (ref 15–37)
BASOPHILS ABSOLUTE: 0.1 K/UL (ref 0–0.2)
BASOPHILS RELATIVE PERCENT: 1.2 %
BILIRUB SERPL-MCNC: 0.6 MG/DL (ref 0–1)
BUN BLDV-MCNC: 16 MG/DL (ref 7–20)
CALCIUM SERPL-MCNC: 9.1 MG/DL (ref 8.3–10.6)
CHLORIDE BLD-SCNC: 106 MMOL/L (ref 99–110)
CO2: 21 MMOL/L (ref 21–32)
CREAT SERPL-MCNC: 1.1 MG/DL (ref 0.8–1.3)
EKG ATRIAL RATE: 340 BPM
EKG DIAGNOSIS: NORMAL
EKG Q-T INTERVAL: 374 MS
EKG QRS DURATION: 96 MS
EKG QTC CALCULATION (BAZETT): 415 MS
EKG R AXIS: -63 DEGREES
EKG T AXIS: 57 DEGREES
EKG VENTRICULAR RATE: 74 BPM
EOSINOPHILS ABSOLUTE: 0.4 K/UL (ref 0–0.6)
EOSINOPHILS RELATIVE PERCENT: 3.8 %
GFR AFRICAN AMERICAN: >60
GFR NON-AFRICAN AMERICAN: >60
GLOBULIN: 2.9 G/DL
GLUCOSE BLD-MCNC: 122 MG/DL (ref 70–99)
GLUCOSE BLD-MCNC: 138 MG/DL (ref 70–99)
HCT VFR BLD CALC: 48.4 % (ref 40.5–52.5)
HEMOGLOBIN: 16.2 G/DL (ref 13.5–17.5)
INR BLD: 2.69 (ref 0.86–1.14)
LYMPHOCYTES ABSOLUTE: 2.4 K/UL (ref 1–5.1)
LYMPHOCYTES RELATIVE PERCENT: 23.3 %
MCH RBC QN AUTO: 30.6 PG (ref 26–34)
MCHC RBC AUTO-ENTMCNC: 33.6 G/DL (ref 31–36)
MCV RBC AUTO: 91.3 FL (ref 80–100)
MONOCYTES ABSOLUTE: 0.7 K/UL (ref 0–1.3)
MONOCYTES RELATIVE PERCENT: 6.7 %
NEUTROPHILS ABSOLUTE: 6.6 K/UL (ref 1.7–7.7)
NEUTROPHILS RELATIVE PERCENT: 65 %
PDW BLD-RTO: 14.5 % (ref 12.4–15.4)
PERFORMED ON: ABNORMAL
PLATELET # BLD: 164 K/UL (ref 135–450)
PMV BLD AUTO: 8 FL (ref 5–10.5)
POTASSIUM REFLEX MAGNESIUM: 4.4 MMOL/L (ref 3.5–5.1)
PROTHROMBIN TIME: 30.7 SEC (ref 9.8–13)
RBC # BLD: 5.3 M/UL (ref 4.2–5.9)
SODIUM BLD-SCNC: 140 MMOL/L (ref 136–145)
TOTAL PROTEIN: 6.6 G/DL (ref 6.4–8.2)
TROPONIN: <0.01 NG/ML
WBC # BLD: 10.2 K/UL (ref 4–11)

## 2019-01-08 PROCEDURE — G8417 CALC BMI ABV UP PARAM F/U: HCPCS | Performed by: FAMILY MEDICINE

## 2019-01-08 PROCEDURE — 99214 OFFICE O/P EST MOD 30 MIN: CPT | Performed by: FAMILY MEDICINE

## 2019-01-08 PROCEDURE — G8482 FLU IMMUNIZE ORDER/ADMIN: HCPCS | Performed by: FAMILY MEDICINE

## 2019-01-08 PROCEDURE — 93010 ELECTROCARDIOGRAM REPORT: CPT | Performed by: INTERNAL MEDICINE

## 2019-01-08 PROCEDURE — 84484 ASSAY OF TROPONIN QUANT: CPT

## 2019-01-08 PROCEDURE — 80053 COMPREHEN METABOLIC PANEL: CPT

## 2019-01-08 PROCEDURE — 1101F PT FALLS ASSESS-DOCD LE1/YR: CPT | Performed by: FAMILY MEDICINE

## 2019-01-08 PROCEDURE — 4040F PNEUMOC VAC/ADMIN/RCVD: CPT | Performed by: FAMILY MEDICINE

## 2019-01-08 PROCEDURE — 85730 THROMBOPLASTIN TIME PARTIAL: CPT

## 2019-01-08 PROCEDURE — 1123F ACP DISCUSS/DSCN MKR DOCD: CPT | Performed by: FAMILY MEDICINE

## 2019-01-08 PROCEDURE — 99284 EMERGENCY DEPT VISIT MOD MDM: CPT

## 2019-01-08 PROCEDURE — 85025 COMPLETE CBC W/AUTO DIFF WBC: CPT

## 2019-01-08 PROCEDURE — 93005 ELECTROCARDIOGRAM TRACING: CPT | Performed by: EMERGENCY MEDICINE

## 2019-01-08 PROCEDURE — 2580000003 HC RX 258: Performed by: EMERGENCY MEDICINE

## 2019-01-08 PROCEDURE — 70450 CT HEAD/BRAIN W/O DYE: CPT

## 2019-01-08 PROCEDURE — 96360 HYDRATION IV INFUSION INIT: CPT

## 2019-01-08 PROCEDURE — 6370000000 HC RX 637 (ALT 250 FOR IP): Performed by: EMERGENCY MEDICINE

## 2019-01-08 PROCEDURE — G8428 CUR MEDS NOT DOCUMENT: HCPCS | Performed by: FAMILY MEDICINE

## 2019-01-08 PROCEDURE — 1036F TOBACCO NON-USER: CPT | Performed by: FAMILY MEDICINE

## 2019-01-08 PROCEDURE — 71045 X-RAY EXAM CHEST 1 VIEW: CPT

## 2019-01-08 PROCEDURE — 85610 PROTHROMBIN TIME: CPT

## 2019-01-08 PROCEDURE — 3017F COLORECTAL CA SCREEN DOC REV: CPT | Performed by: FAMILY MEDICINE

## 2019-01-08 RX ORDER — MECLIZINE HYDROCHLORIDE CHEWABLE TABLETS 25 MG/1
25 TABLET, CHEWABLE ORAL ONCE
Status: COMPLETED | OUTPATIENT
Start: 2019-01-08 | End: 2019-01-08

## 2019-01-08 RX ORDER — 0.9 % SODIUM CHLORIDE 0.9 %
1000 INTRAVENOUS SOLUTION INTRAVENOUS ONCE
Status: COMPLETED | OUTPATIENT
Start: 2019-01-08 | End: 2019-01-08

## 2019-01-08 RX ADMIN — MECLIZINE HCL 25 MG: 25 TABLET, CHEWABLE ORAL at 14:55

## 2019-01-08 RX ADMIN — SODIUM CHLORIDE 1000 ML: 9 INJECTION, SOLUTION INTRAVENOUS at 13:29

## 2019-01-14 ENCOUNTER — OFFICE VISIT (OUTPATIENT)
Dept: FAMILY MEDICINE CLINIC | Age: 69
End: 2019-01-14
Payer: COMMERCIAL

## 2019-01-14 VITALS
SYSTOLIC BLOOD PRESSURE: 118 MMHG | TEMPERATURE: 97.2 F | DIASTOLIC BLOOD PRESSURE: 72 MMHG | WEIGHT: 247.5 LBS | OXYGEN SATURATION: 95 % | BODY MASS INDEX: 36.55 KG/M2 | HEART RATE: 89 BPM

## 2019-01-14 DIAGNOSIS — H61.21 IMPACTED CERUMEN OF RIGHT EAR: Primary | ICD-10-CM

## 2019-01-14 PROCEDURE — 1101F PT FALLS ASSESS-DOCD LE1/YR: CPT | Performed by: NURSE PRACTITIONER

## 2019-01-14 PROCEDURE — G8417 CALC BMI ABV UP PARAM F/U: HCPCS | Performed by: NURSE PRACTITIONER

## 2019-01-14 PROCEDURE — 4040F PNEUMOC VAC/ADMIN/RCVD: CPT | Performed by: NURSE PRACTITIONER

## 2019-01-14 PROCEDURE — 99214 OFFICE O/P EST MOD 30 MIN: CPT | Performed by: NURSE PRACTITIONER

## 2019-01-14 PROCEDURE — 3017F COLORECTAL CA SCREEN DOC REV: CPT | Performed by: NURSE PRACTITIONER

## 2019-01-14 PROCEDURE — 1123F ACP DISCUSS/DSCN MKR DOCD: CPT | Performed by: NURSE PRACTITIONER

## 2019-01-14 PROCEDURE — G8427 DOCREV CUR MEDS BY ELIG CLIN: HCPCS | Performed by: NURSE PRACTITIONER

## 2019-01-14 PROCEDURE — G8482 FLU IMMUNIZE ORDER/ADMIN: HCPCS | Performed by: NURSE PRACTITIONER

## 2019-01-14 PROCEDURE — 1036F TOBACCO NON-USER: CPT | Performed by: NURSE PRACTITIONER

## 2019-01-14 RX ORDER — OFLOXACIN 3 MG/ML
5 SOLUTION AURICULAR (OTIC) 2 TIMES DAILY
Qty: 10 ML | Refills: 0 | Status: SHIPPED | OUTPATIENT
Start: 2019-01-14 | End: 2019-01-14

## 2019-01-14 RX ORDER — GABAPENTIN 300 MG/1
CAPSULE ORAL
Refills: 2 | Status: ON HOLD | COMMUNITY
Start: 2019-01-11 | End: 2019-08-01

## 2019-01-14 RX ORDER — POLYMYXIN B SULFATE AND TRIMETHOPRIM 1; 10000 MG/ML; [USP'U]/ML
4 SOLUTION OPHTHALMIC EVERY 6 HOURS
Qty: 1 BOTTLE | Refills: 0 | Status: SHIPPED | OUTPATIENT
Start: 2019-01-14 | End: 2019-01-21

## 2019-01-15 ENCOUNTER — TELEPHONE (OUTPATIENT)
Dept: FAMILY MEDICINE CLINIC | Age: 69
End: 2019-01-15

## 2019-01-16 ENCOUNTER — OFFICE VISIT (OUTPATIENT)
Dept: PULMONOLOGY | Age: 69
End: 2019-01-16
Payer: COMMERCIAL

## 2019-01-16 VITALS
DIASTOLIC BLOOD PRESSURE: 60 MMHG | OXYGEN SATURATION: 97 % | HEIGHT: 70 IN | RESPIRATION RATE: 18 BRPM | SYSTOLIC BLOOD PRESSURE: 102 MMHG | WEIGHT: 248.6 LBS | HEART RATE: 75 BPM | BODY MASS INDEX: 35.59 KG/M2 | TEMPERATURE: 97.5 F

## 2019-01-16 DIAGNOSIS — E66.9 OBESITY (BMI 30-39.9): Chronic | ICD-10-CM

## 2019-01-16 DIAGNOSIS — G47.33 OSA ON CPAP: Chronic | ICD-10-CM

## 2019-01-16 DIAGNOSIS — Z99.89 OSA ON CPAP: Chronic | ICD-10-CM

## 2019-01-16 DIAGNOSIS — J45.41 MODERATE PERSISTENT ASTHMA WITH ACUTE EXACERBATION: Primary | ICD-10-CM

## 2019-01-16 PROCEDURE — 4040F PNEUMOC VAC/ADMIN/RCVD: CPT | Performed by: INTERNAL MEDICINE

## 2019-01-16 PROCEDURE — 1101F PT FALLS ASSESS-DOCD LE1/YR: CPT | Performed by: INTERNAL MEDICINE

## 2019-01-16 PROCEDURE — 99214 OFFICE O/P EST MOD 30 MIN: CPT | Performed by: INTERNAL MEDICINE

## 2019-01-16 PROCEDURE — G8417 CALC BMI ABV UP PARAM F/U: HCPCS | Performed by: INTERNAL MEDICINE

## 2019-01-16 PROCEDURE — 1036F TOBACCO NON-USER: CPT | Performed by: INTERNAL MEDICINE

## 2019-01-16 PROCEDURE — 1123F ACP DISCUSS/DSCN MKR DOCD: CPT | Performed by: INTERNAL MEDICINE

## 2019-01-16 PROCEDURE — G8427 DOCREV CUR MEDS BY ELIG CLIN: HCPCS | Performed by: INTERNAL MEDICINE

## 2019-01-16 PROCEDURE — 3017F COLORECTAL CA SCREEN DOC REV: CPT | Performed by: INTERNAL MEDICINE

## 2019-01-16 PROCEDURE — G8482 FLU IMMUNIZE ORDER/ADMIN: HCPCS | Performed by: INTERNAL MEDICINE

## 2019-01-16 RX ORDER — AMOXICILLIN AND CLAVULANATE POTASSIUM 875; 125 MG/1; MG/1
1 TABLET, FILM COATED ORAL 2 TIMES DAILY
Qty: 14 TABLET | Refills: 0 | Status: SHIPPED | OUTPATIENT
Start: 2019-01-16 | End: 2019-01-23

## 2019-01-16 RX ORDER — IPRATROPIUM BROMIDE AND ALBUTEROL SULFATE 2.5; .5 MG/3ML; MG/3ML
1 SOLUTION RESPIRATORY (INHALATION) EVERY 6 HOURS PRN
Qty: 360 ML | Refills: 5 | Status: SHIPPED | OUTPATIENT
Start: 2019-01-16 | End: 2019-01-16 | Stop reason: SDUPTHER

## 2019-01-16 RX ORDER — AMOXICILLIN AND CLAVULANATE POTASSIUM 875; 125 MG/1; MG/1
1 TABLET, FILM COATED ORAL 2 TIMES DAILY
Qty: 14 TABLET | Refills: 0 | Status: SHIPPED | OUTPATIENT
Start: 2019-01-16 | End: 2019-01-16 | Stop reason: SDUPTHER

## 2019-01-16 RX ORDER — IPRATROPIUM BROMIDE AND ALBUTEROL SULFATE 2.5; .5 MG/3ML; MG/3ML
1 SOLUTION RESPIRATORY (INHALATION) EVERY 6 HOURS PRN
Qty: 360 ML | Refills: 5 | Status: SHIPPED | OUTPATIENT
Start: 2019-01-16 | End: 2021-11-04 | Stop reason: SDUPTHER

## 2019-01-16 RX ORDER — PREDNISONE 10 MG/1
TABLET ORAL
Qty: 30 TABLET | Refills: 0 | Status: SHIPPED | OUTPATIENT
Start: 2019-01-16 | End: 2019-01-28

## 2019-01-16 RX ORDER — PREDNISONE 10 MG/1
TABLET ORAL
Qty: 30 TABLET | Refills: 0 | Status: SHIPPED | OUTPATIENT
Start: 2019-01-16 | End: 2019-01-16 | Stop reason: SDUPTHER

## 2019-01-16 ASSESSMENT — SLEEP AND FATIGUE QUESTIONNAIRES
HOW LIKELY ARE YOU TO NOD OFF OR FALL ASLEEP WHILE LYING DOWN TO REST IN THE AFTERNOON WHEN CIRCUMSTANCES PERMIT: 2
ESS TOTAL SCORE: 8
HOW LIKELY ARE YOU TO NOD OFF OR FALL ASLEEP IN A CAR, WHILE STOPPED FOR A FEW MINUTES IN TRAFFIC: 0
HOW LIKELY ARE YOU TO NOD OFF OR FALL ASLEEP WHILE SITTING INACTIVE IN A PUBLIC PLACE: 2
HOW LIKELY ARE YOU TO NOD OFF OR FALL ASLEEP WHILE SITTING QUIETLY AFTER LUNCH WITHOUT ALCOHOL: 0
NECK CIRCUMFERENCE (INCHES): 19
HOW LIKELY ARE YOU TO NOD OFF OR FALL ASLEEP WHILE SITTING AND TALKING TO SOMEONE: 0
HOW LIKELY ARE YOU TO NOD OFF OR FALL ASLEEP WHEN YOU ARE A PASSENGER IN A CAR FOR AN HOUR WITHOUT A BREAK: 0
HOW LIKELY ARE YOU TO NOD OFF OR FALL ASLEEP WHILE SITTING AND READING: 2
HOW LIKELY ARE YOU TO NOD OFF OR FALL ASLEEP WHILE WATCHING TV: 2

## 2019-01-17 ENCOUNTER — OFFICE VISIT (OUTPATIENT)
Dept: FAMILY MEDICINE CLINIC | Age: 69
End: 2019-01-17
Payer: COMMERCIAL

## 2019-01-17 VITALS
BODY MASS INDEX: 35.15 KG/M2 | WEIGHT: 245 LBS | OXYGEN SATURATION: 95 % | DIASTOLIC BLOOD PRESSURE: 77 MMHG | HEART RATE: 81 BPM | TEMPERATURE: 97.9 F | SYSTOLIC BLOOD PRESSURE: 120 MMHG

## 2019-01-17 DIAGNOSIS — H81.10 BENIGN PAROXYSMAL POSITIONAL VERTIGO, UNSPECIFIED LATERALITY: Primary | ICD-10-CM

## 2019-01-17 PROCEDURE — G8427 DOCREV CUR MEDS BY ELIG CLIN: HCPCS | Performed by: FAMILY MEDICINE

## 2019-01-17 PROCEDURE — 1123F ACP DISCUSS/DSCN MKR DOCD: CPT | Performed by: FAMILY MEDICINE

## 2019-01-17 PROCEDURE — G8482 FLU IMMUNIZE ORDER/ADMIN: HCPCS | Performed by: FAMILY MEDICINE

## 2019-01-17 PROCEDURE — G8417 CALC BMI ABV UP PARAM F/U: HCPCS | Performed by: FAMILY MEDICINE

## 2019-01-17 PROCEDURE — 3017F COLORECTAL CA SCREEN DOC REV: CPT | Performed by: FAMILY MEDICINE

## 2019-01-17 PROCEDURE — 4040F PNEUMOC VAC/ADMIN/RCVD: CPT | Performed by: FAMILY MEDICINE

## 2019-01-17 PROCEDURE — 1101F PT FALLS ASSESS-DOCD LE1/YR: CPT | Performed by: FAMILY MEDICINE

## 2019-01-17 PROCEDURE — 99213 OFFICE O/P EST LOW 20 MIN: CPT | Performed by: FAMILY MEDICINE

## 2019-01-17 PROCEDURE — 1036F TOBACCO NON-USER: CPT | Performed by: FAMILY MEDICINE

## 2019-01-17 RX ORDER — LEVOTHYROXINE SODIUM 0.07 MG/1
TABLET ORAL
Qty: 90 TABLET | Refills: 0 | Status: SHIPPED | OUTPATIENT
Start: 2019-01-17 | End: 2019-05-03 | Stop reason: SDUPTHER

## 2019-01-17 RX ORDER — PRAVASTATIN SODIUM 20 MG
TABLET ORAL
Qty: 90 TABLET | Refills: 0 | Status: SHIPPED | OUTPATIENT
Start: 2019-01-17 | End: 2019-05-18 | Stop reason: SDUPTHER

## 2019-01-17 RX ORDER — PANTOPRAZOLE SODIUM 40 MG/1
40 TABLET, DELAYED RELEASE ORAL DAILY
Qty: 90 TABLET | Refills: 0 | Status: SHIPPED | OUTPATIENT
Start: 2019-01-17 | End: 2019-05-13 | Stop reason: SDUPTHER

## 2019-01-23 ENCOUNTER — ANTI-COAG VISIT (OUTPATIENT)
Dept: PHARMACY | Age: 69
End: 2019-01-23
Payer: COMMERCIAL

## 2019-01-23 DIAGNOSIS — I48.20 CHRONIC ATRIAL FIBRILLATION (HCC): ICD-10-CM

## 2019-01-23 LAB — INR BLD: 8

## 2019-01-23 PROCEDURE — 99211 OFF/OP EST MAY X REQ PHY/QHP: CPT | Performed by: PHARMACIST

## 2019-01-23 PROCEDURE — 85610 PROTHROMBIN TIME: CPT | Performed by: PHARMACIST

## 2019-01-28 RX ORDER — METOPROLOL TARTRATE 50 MG/1
50 TABLET, FILM COATED ORAL 2 TIMES DAILY
Qty: 30 TABLET | Refills: 0 | Status: SHIPPED | OUTPATIENT
Start: 2019-01-28 | End: 2019-01-29 | Stop reason: SDUPTHER

## 2019-01-29 RX ORDER — METOPROLOL TARTRATE 50 MG/1
50 TABLET, FILM COATED ORAL 2 TIMES DAILY
Qty: 60 TABLET | Refills: 3 | Status: SHIPPED | OUTPATIENT
Start: 2019-01-29 | End: 2019-08-19

## 2019-01-30 ENCOUNTER — ANTI-COAG VISIT (OUTPATIENT)
Dept: PHARMACY | Age: 69
End: 2019-01-30
Payer: COMMERCIAL

## 2019-01-30 DIAGNOSIS — I48.20 CHRONIC ATRIAL FIBRILLATION (HCC): ICD-10-CM

## 2019-01-30 LAB — INTERNATIONAL NORMALIZATION RATIO, POC: 1.5

## 2019-01-30 PROCEDURE — 85610 PROTHROMBIN TIME: CPT | Performed by: PHARMACIST

## 2019-01-30 PROCEDURE — 99211 OFF/OP EST MAY X REQ PHY/QHP: CPT | Performed by: PHARMACIST

## 2019-02-06 ENCOUNTER — ANTI-COAG VISIT (OUTPATIENT)
Dept: PHARMACY | Age: 69
End: 2019-02-06
Payer: COMMERCIAL

## 2019-02-06 DIAGNOSIS — I48.20 CHRONIC ATRIAL FIBRILLATION (HCC): ICD-10-CM

## 2019-02-06 LAB — INTERNATIONAL NORMALIZATION RATIO, POC: 2.4

## 2019-02-06 PROCEDURE — 99211 OFF/OP EST MAY X REQ PHY/QHP: CPT | Performed by: PHARMACIST

## 2019-02-06 PROCEDURE — 85610 PROTHROMBIN TIME: CPT | Performed by: PHARMACIST

## 2019-02-07 ENCOUNTER — TELEPHONE (OUTPATIENT)
Dept: FAMILY MEDICINE CLINIC | Age: 69
End: 2019-02-07

## 2019-02-08 ENCOUNTER — TELEPHONE (OUTPATIENT)
Dept: FAMILY MEDICINE CLINIC | Age: 69
End: 2019-02-08

## 2019-02-19 ENCOUNTER — TELEPHONE (OUTPATIENT)
Dept: PULMONOLOGY | Age: 69
End: 2019-02-19

## 2019-02-19 ENCOUNTER — OFFICE VISIT (OUTPATIENT)
Dept: CARDIOLOGY CLINIC | Age: 69
End: 2019-02-19
Payer: COMMERCIAL

## 2019-02-19 VITALS
DIASTOLIC BLOOD PRESSURE: 62 MMHG | BODY MASS INDEX: 35.84 KG/M2 | SYSTOLIC BLOOD PRESSURE: 110 MMHG | WEIGHT: 242 LBS | HEART RATE: 86 BPM | OXYGEN SATURATION: 94 % | HEIGHT: 69 IN

## 2019-02-19 DIAGNOSIS — E78.2 MIXED HYPERLIPIDEMIA: Chronic | ICD-10-CM

## 2019-02-19 DIAGNOSIS — R06.02 SOB (SHORTNESS OF BREATH) ON EXERTION: ICD-10-CM

## 2019-02-19 DIAGNOSIS — Z98.890 S/P CARDIAC CATH: Chronic | ICD-10-CM

## 2019-02-19 DIAGNOSIS — I48.20 CHRONIC ATRIAL FIBRILLATION (HCC): Primary | Chronic | ICD-10-CM

## 2019-02-19 DIAGNOSIS — I10 ESSENTIAL HYPERTENSION: Chronic | ICD-10-CM

## 2019-02-19 PROCEDURE — 1036F TOBACCO NON-USER: CPT | Performed by: INTERNAL MEDICINE

## 2019-02-19 PROCEDURE — 3017F COLORECTAL CA SCREEN DOC REV: CPT | Performed by: INTERNAL MEDICINE

## 2019-02-19 PROCEDURE — 1123F ACP DISCUSS/DSCN MKR DOCD: CPT | Performed by: INTERNAL MEDICINE

## 2019-02-19 PROCEDURE — 99215 OFFICE O/P EST HI 40 MIN: CPT | Performed by: INTERNAL MEDICINE

## 2019-02-19 PROCEDURE — 1101F PT FALLS ASSESS-DOCD LE1/YR: CPT | Performed by: INTERNAL MEDICINE

## 2019-02-19 PROCEDURE — G8417 CALC BMI ABV UP PARAM F/U: HCPCS | Performed by: INTERNAL MEDICINE

## 2019-02-19 PROCEDURE — 4040F PNEUMOC VAC/ADMIN/RCVD: CPT | Performed by: INTERNAL MEDICINE

## 2019-02-19 PROCEDURE — G8427 DOCREV CUR MEDS BY ELIG CLIN: HCPCS | Performed by: INTERNAL MEDICINE

## 2019-02-19 PROCEDURE — G8482 FLU IMMUNIZE ORDER/ADMIN: HCPCS | Performed by: INTERNAL MEDICINE

## 2019-02-19 RX ORDER — PREDNISONE 10 MG/1
TABLET ORAL
Qty: 30 TABLET | Refills: 0 | Status: SHIPPED | OUTPATIENT
Start: 2019-02-19 | End: 2019-03-01

## 2019-02-20 ENCOUNTER — OFFICE VISIT (OUTPATIENT)
Dept: PULMONOLOGY | Age: 69
End: 2019-02-20
Payer: COMMERCIAL

## 2019-02-20 VITALS
HEIGHT: 70 IN | BODY MASS INDEX: 35.22 KG/M2 | RESPIRATION RATE: 16 BRPM | DIASTOLIC BLOOD PRESSURE: 68 MMHG | OXYGEN SATURATION: 96 % | SYSTOLIC BLOOD PRESSURE: 112 MMHG | WEIGHT: 246 LBS | HEART RATE: 95 BPM | TEMPERATURE: 97.8 F

## 2019-02-20 DIAGNOSIS — G47.33 OSA ON CPAP: Chronic | ICD-10-CM

## 2019-02-20 DIAGNOSIS — Z99.89 OSA ON CPAP: Chronic | ICD-10-CM

## 2019-02-20 DIAGNOSIS — E66.9 OBESITY (BMI 30-39.9): Chronic | ICD-10-CM

## 2019-02-20 DIAGNOSIS — J45.41 MODERATE PERSISTENT ASTHMA WITH ACUTE EXACERBATION: Primary | ICD-10-CM

## 2019-02-20 PROCEDURE — G8427 DOCREV CUR MEDS BY ELIG CLIN: HCPCS | Performed by: INTERNAL MEDICINE

## 2019-02-20 PROCEDURE — 99214 OFFICE O/P EST MOD 30 MIN: CPT | Performed by: INTERNAL MEDICINE

## 2019-02-20 PROCEDURE — 1101F PT FALLS ASSESS-DOCD LE1/YR: CPT | Performed by: INTERNAL MEDICINE

## 2019-02-20 PROCEDURE — G8482 FLU IMMUNIZE ORDER/ADMIN: HCPCS | Performed by: INTERNAL MEDICINE

## 2019-02-20 PROCEDURE — 4040F PNEUMOC VAC/ADMIN/RCVD: CPT | Performed by: INTERNAL MEDICINE

## 2019-02-20 PROCEDURE — G8417 CALC BMI ABV UP PARAM F/U: HCPCS | Performed by: INTERNAL MEDICINE

## 2019-02-20 PROCEDURE — 1036F TOBACCO NON-USER: CPT | Performed by: INTERNAL MEDICINE

## 2019-02-20 PROCEDURE — 3017F COLORECTAL CA SCREEN DOC REV: CPT | Performed by: INTERNAL MEDICINE

## 2019-02-20 PROCEDURE — 1123F ACP DISCUSS/DSCN MKR DOCD: CPT | Performed by: INTERNAL MEDICINE

## 2019-02-20 RX ORDER — PREDNISONE 10 MG/1
10 TABLET ORAL DAILY
Qty: 30 TABLET | Refills: 0 | Status: SHIPPED | OUTPATIENT
Start: 2019-03-02 | End: 2019-04-01

## 2019-02-20 RX ORDER — DOXYCYCLINE HYCLATE 100 MG
100 TABLET ORAL 2 TIMES DAILY
Qty: 20 TABLET | Refills: 0 | Status: SHIPPED | OUTPATIENT
Start: 2019-02-20 | End: 2019-03-02

## 2019-02-27 ENCOUNTER — ANTI-COAG VISIT (OUTPATIENT)
Dept: PHARMACY | Age: 69
End: 2019-02-27
Payer: COMMERCIAL

## 2019-02-27 DIAGNOSIS — I48.20 CHRONIC ATRIAL FIBRILLATION (HCC): ICD-10-CM

## 2019-02-27 LAB — INTERNATIONAL NORMALIZATION RATIO, POC: 1.9

## 2019-02-27 PROCEDURE — 99211 OFF/OP EST MAY X REQ PHY/QHP: CPT | Performed by: PHARMACIST

## 2019-02-27 PROCEDURE — 85610 PROTHROMBIN TIME: CPT | Performed by: PHARMACIST

## 2019-03-07 ENCOUNTER — TELEPHONE (OUTPATIENT)
Dept: CARDIOLOGY CLINIC | Age: 69
End: 2019-03-07

## 2019-03-07 ENCOUNTER — HOSPITAL ENCOUNTER (OUTPATIENT)
Dept: NUCLEAR MEDICINE | Age: 69
Discharge: HOME | End: 2019-03-07
Payer: COMMERCIAL

## 2019-03-07 ENCOUNTER — HOSPITAL ENCOUNTER (OUTPATIENT)
Dept: NON INVASIVE DIAGNOSTICS | Age: 69
Discharge: HOME | End: 2019-03-07
Payer: COMMERCIAL

## 2019-03-07 DIAGNOSIS — R06.02 SOB (SHORTNESS OF BREATH) ON EXERTION: ICD-10-CM

## 2019-03-07 DIAGNOSIS — I48.20 CHRONIC ATRIAL FIBRILLATION (HCC): Chronic | ICD-10-CM

## 2019-03-07 LAB
LV EF: 54 %
LVEF MODALITY: NORMAL

## 2019-03-07 PROCEDURE — 93308 TTE F-UP OR LMTD: CPT

## 2019-03-07 PROCEDURE — 6360000002 HC RX W HCPCS: Performed by: INTERNAL MEDICINE

## 2019-03-07 PROCEDURE — A9502 TC99M TETROFOSMIN: HCPCS | Performed by: INTERNAL MEDICINE

## 2019-03-07 PROCEDURE — 3430000000 HC RX DIAGNOSTIC RADIOPHARMACEUTICAL: Performed by: INTERNAL MEDICINE

## 2019-03-07 PROCEDURE — 93017 CV STRESS TEST TRACING ONLY: CPT

## 2019-03-07 PROCEDURE — 78452 HT MUSCLE IMAGE SPECT MULT: CPT

## 2019-03-07 RX ADMIN — REGADENOSON 0.4 MG: 0.08 INJECTION, SOLUTION INTRAVENOUS at 08:40

## 2019-03-07 RX ADMIN — TETROFOSMIN 11 MILLICURIE: 0.23 INJECTION, POWDER, LYOPHILIZED, FOR SOLUTION INTRAVENOUS at 07:35

## 2019-03-07 RX ADMIN — TETROFOSMIN 34 MILLICURIE: 0.23 INJECTION, POWDER, LYOPHILIZED, FOR SOLUTION INTRAVENOUS at 08:40

## 2019-03-15 ENCOUNTER — TELEPHONE (OUTPATIENT)
Dept: PULMONOLOGY | Age: 69
End: 2019-03-15

## 2019-03-15 ENCOUNTER — HOSPITAL ENCOUNTER (OUTPATIENT)
Dept: CARDIAC CATH/INVASIVE PROCEDURES | Age: 69
Discharge: HOME | End: 2019-03-15
Attending: INTERNAL MEDICINE | Admitting: INTERNAL MEDICINE
Payer: COMMERCIAL

## 2019-03-15 LAB
ANION GAP SERPL CALCULATED.3IONS-SCNC: 10 MMOL/L (ref 3–16)
BUN BLDV-MCNC: 18 MG/DL (ref 7–20)
CALCIUM SERPL-MCNC: 8.6 MG/DL (ref 8.3–10.6)
CHLORIDE BLD-SCNC: 104 MMOL/L (ref 99–110)
CO2: 24 MMOL/L (ref 21–32)
CREAT SERPL-MCNC: 0.8 MG/DL (ref 0.8–1.3)
EKG ATRIAL RATE: 416 BPM
EKG DIAGNOSIS: NORMAL
EKG Q-T INTERVAL: 400 MS
EKG QRS DURATION: 98 MS
EKG QTC CALCULATION (BAZETT): 428 MS
EKG R AXIS: -32 DEGREES
EKG T AXIS: 26 DEGREES
EKG VENTRICULAR RATE: 69 BPM
GFR AFRICAN AMERICAN: >60
GFR NON-AFRICAN AMERICAN: >60
GLUCOSE BLD-MCNC: 82 MG/DL (ref 70–99)
HCT VFR BLD CALC: 46.8 % (ref 40.5–52.5)
HEMOGLOBIN: 15.8 G/DL (ref 13.5–17.5)
INR BLD: 1.08 (ref 0.86–1.14)
MCH RBC QN AUTO: 31.1 PG (ref 26–34)
MCHC RBC AUTO-ENTMCNC: 33.8 G/DL (ref 31–36)
MCV RBC AUTO: 91.9 FL (ref 80–100)
PDW BLD-RTO: 15 % (ref 12.4–15.4)
PLATELET # BLD: 158 K/UL (ref 135–450)
PMV BLD AUTO: 7.8 FL (ref 5–10.5)
POTASSIUM SERPL-SCNC: 3.3 MMOL/L (ref 3.5–5.1)
PROTHROMBIN TIME: 12.3 SEC (ref 9.8–13)
RBC # BLD: 5.09 M/UL (ref 4.2–5.9)
SODIUM BLD-SCNC: 138 MMOL/L (ref 136–145)
WBC # BLD: 11.6 K/UL (ref 4–11)

## 2019-03-15 PROCEDURE — C1760 CLOSURE DEV, VASC: HCPCS

## 2019-03-15 PROCEDURE — 2580000003 HC RX 258

## 2019-03-15 PROCEDURE — 99152 MOD SED SAME PHYS/QHP 5/>YRS: CPT

## 2019-03-15 PROCEDURE — C1894 INTRO/SHEATH, NON-LASER: HCPCS

## 2019-03-15 PROCEDURE — 93005 ELECTROCARDIOGRAM TRACING: CPT | Performed by: INTERNAL MEDICINE

## 2019-03-15 PROCEDURE — 2709999900 HC NON-CHARGEABLE SUPPLY

## 2019-03-15 PROCEDURE — 85610 PROTHROMBIN TIME: CPT

## 2019-03-15 PROCEDURE — 80048 BASIC METABOLIC PNL TOTAL CA: CPT

## 2019-03-15 PROCEDURE — 85027 COMPLETE CBC AUTOMATED: CPT

## 2019-03-15 PROCEDURE — 6360000002 HC RX W HCPCS

## 2019-03-15 PROCEDURE — 99153 MOD SED SAME PHYS/QHP EA: CPT

## 2019-03-15 PROCEDURE — 6370000000 HC RX 637 (ALT 250 FOR IP)

## 2019-03-15 PROCEDURE — 99152 MOD SED SAME PHYS/QHP 5/>YRS: CPT | Performed by: INTERNAL MEDICINE

## 2019-03-15 PROCEDURE — 93460 R&L HRT ART/VENTRICLE ANGIO: CPT | Performed by: INTERNAL MEDICINE

## 2019-03-15 PROCEDURE — C1769 GUIDE WIRE: HCPCS

## 2019-03-15 PROCEDURE — 2500000003 HC RX 250 WO HCPCS

## 2019-03-15 PROCEDURE — 93460 R&L HRT ART/VENTRICLE ANGIO: CPT

## 2019-03-15 PROCEDURE — C1751 CATH, INF, PER/CENT/MIDLINE: HCPCS

## 2019-03-15 RX ORDER — FENTANYL CITRATE 50 UG/ML
INJECTION, SOLUTION INTRAMUSCULAR; INTRAVENOUS
Status: COMPLETED | OUTPATIENT
Start: 2019-03-15 | End: 2019-03-15

## 2019-03-15 RX ORDER — SODIUM CHLORIDE 9 MG/ML
1000 INJECTION, SOLUTION INTRAVENOUS CONTINUOUS
Status: DISCONTINUED | OUTPATIENT
Start: 2019-03-15 | End: 2019-03-15 | Stop reason: HOSPADM

## 2019-03-15 RX ORDER — AMLODIPINE BESYLATE 2.5 MG/1
2.5 TABLET ORAL DAILY
Qty: 30 TABLET | Refills: 3 | Status: SHIPPED | OUTPATIENT
Start: 2019-03-15 | End: 2019-06-03 | Stop reason: SINTOL

## 2019-03-15 RX ORDER — MIDAZOLAM HYDROCHLORIDE 5 MG/ML
INJECTION INTRAMUSCULAR; INTRAVENOUS
Status: COMPLETED | OUTPATIENT
Start: 2019-03-15 | End: 2019-03-15

## 2019-03-15 RX ORDER — ASPIRIN 325 MG
325 TABLET ORAL ONCE
Status: COMPLETED | OUTPATIENT
Start: 2019-03-15 | End: 2019-03-15

## 2019-03-15 RX ADMIN — Medication 325 MG: at 08:50

## 2019-03-15 RX ADMIN — FENTANYL CITRATE 25 MCG: 50 INJECTION, SOLUTION INTRAMUSCULAR; INTRAVENOUS at 12:18

## 2019-03-15 RX ADMIN — FENTANYL CITRATE 50 MCG: 50 INJECTION, SOLUTION INTRAMUSCULAR; INTRAVENOUS at 12:38

## 2019-03-15 RX ADMIN — SODIUM CHLORIDE 1000 ML: 9 INJECTION, SOLUTION INTRAVENOUS at 08:15

## 2019-03-15 RX ADMIN — MIDAZOLAM HYDROCHLORIDE 2 MG: 5 INJECTION INTRAMUSCULAR; INTRAVENOUS at 12:19

## 2019-03-15 NOTE — TELEPHONE ENCOUNTER
Cancelled patients 3/15/19 1 month follow-up, patient is IP at [facility]. ASSESSMENT AND PLAN:2/20/19           Asthma moderate persistent with AE  -Prednisone taper and doxy x 10 days  -   Continue prednisone 10 mg after taper for the rest of the month  Per hx. Patient states that change in weather is a trigger. PFTs did not show obstruction  - continue Advair, lower dose, continue albuterol prn  - control nasal symptoms with flonase       Obesity  - advised weight loss with diet and exercise     Cough    I favor either PND or cough variant asthma as etiology. Stable  - continue nasal ICS      Dyspnea    Based on the information available thus far, I favor a diagnosis of asthma or CAD. -Worse        CLARISSE (Obstructive Sleep Apnea)   - continue CPAP 9 cwp 6-8 hrs at night. - Weight loss and exercise. - Sleep hygiene  - Avoid sedatives, alcohol and caffeinated drinks at bed time. - Avoid driving while sleepy.   - Weight loss is also recommended as a long-term intervention.    - Complications of CLARISSE if not treated were discussed with patient patient, including: systemic hypertension, pulmonary hypertension, cardiovascular morbidities, car accidents and all cause mortality.           Inactive problems     Pulmonary nodules  - 4mm in LLL  - stable for 2 years, no further f/u needed

## 2019-03-18 ENCOUNTER — TELEPHONE (OUTPATIENT)
Dept: CARDIOLOGY CLINIC | Age: 69
End: 2019-03-18

## 2019-03-28 RX ORDER — LISINOPRIL 10 MG/1
10 TABLET ORAL DAILY
Qty: 90 TABLET | Refills: 5 | Status: SHIPPED | OUTPATIENT
Start: 2019-03-28 | End: 2020-03-05 | Stop reason: SDUPTHER

## 2019-04-10 ENCOUNTER — ANTI-COAG VISIT (OUTPATIENT)
Dept: PHARMACY | Age: 69
End: 2019-04-10
Payer: COMMERCIAL

## 2019-04-10 DIAGNOSIS — I48.20 CHRONIC ATRIAL FIBRILLATION (HCC): ICD-10-CM

## 2019-04-10 LAB — INTERNATIONAL NORMALIZATION RATIO, POC: 3.6

## 2019-04-10 PROCEDURE — 99211 OFF/OP EST MAY X REQ PHY/QHP: CPT | Performed by: PHARMACIST

## 2019-04-10 PROCEDURE — 85610 PROTHROMBIN TIME: CPT | Performed by: PHARMACIST

## 2019-04-18 LAB
AGE TIME: 67 YRS
BUN BLDV-MCNC: 16 MG/DL (ref 7–20)
CREAT SERPL-MCNC: 1.05 MG/DL (ref 0.7–1.3)
GFR AFRICAN AMERICAN: 85 ML/MIN
GFR NON-AFRICAN AMERICAN: 70 ML/MIN

## 2019-04-23 RX ORDER — DILTIAZEM HYDROCHLORIDE 360 MG/1
CAPSULE, EXTENDED RELEASE ORAL
Qty: 90 CAPSULE | Refills: 1 | Status: SHIPPED | OUTPATIENT
Start: 2019-04-23 | End: 2019-06-03 | Stop reason: ALTCHOICE

## 2019-04-24 ENCOUNTER — TELEPHONE (OUTPATIENT)
Dept: CARDIOLOGY CLINIC | Age: 69
End: 2019-04-24

## 2019-04-24 NOTE — TELEPHONE ENCOUNTER
Pt's wife, Anton Crawford, called and said that pt's hands, feet and now face has been swelling on & off for the last 2 wks. When pt takes water pills, it will help, but the swelling continues. Wife said she took pt off regular table salt and gave him a substitute. Thought this may causing the swelling so she stopped that and pt is back on regular table salt. Pt's pharmacy - [address]   Please return wife's call and advise

## 2019-04-26 NOTE — TELEPHONE ENCOUNTER
Last ov 02/19/2019:  Plan:  1. I have encouraged better diet and making sure diabetes is controlled as best possible. 2. Follow up with me in 9 months  3. Will check limited ECHO and lexiscan stress test. See #5 above. 4. No med changes warranted today  5. Meds reviewed and no refills warranted---PCP fills  6. He will check with his pharmacy for cost of Xarelto 20 mg 1 day or Eliquis 5 mg twice a day and let me know if he wants to change to NOAC.

## 2019-04-26 NOTE — TELEPHONE ENCOUNTER
Heart cath good overall without significant heart artery blockages back in March. Good news. What water pills is he taking?  Any new medications added recently or allergies he is aware of?

## 2019-05-01 ENCOUNTER — APPOINTMENT (OUTPATIENT)
Dept: GENERAL RADIOLOGY | Age: 69
End: 2019-05-01
Payer: COMMERCIAL

## 2019-05-01 ENCOUNTER — HOSPITAL ENCOUNTER (EMERGENCY)
Age: 69
Discharge: HOME | End: 2019-05-01
Payer: COMMERCIAL

## 2019-05-01 ENCOUNTER — APPOINTMENT (OUTPATIENT)
Dept: CT IMAGING | Age: 69
End: 2019-05-01
Payer: COMMERCIAL

## 2019-05-01 VITALS
HEART RATE: 102 BPM | TEMPERATURE: 98 F | SYSTOLIC BLOOD PRESSURE: 133 MMHG | WEIGHT: 240 LBS | RESPIRATION RATE: 14 BRPM | BODY MASS INDEX: 34.36 KG/M2 | DIASTOLIC BLOOD PRESSURE: 70 MMHG | HEIGHT: 70 IN | OXYGEN SATURATION: 98 %

## 2019-05-01 DIAGNOSIS — S83.91XA SPRAIN OF RIGHT KNEE/LEG, INITIAL ENCOUNTER: ICD-10-CM

## 2019-05-01 DIAGNOSIS — S09.90XA CLOSED HEAD INJURY, INITIAL ENCOUNTER: ICD-10-CM

## 2019-05-01 DIAGNOSIS — S99.911A RIGHT ANKLE INJURY, INITIAL ENCOUNTER: Primary | ICD-10-CM

## 2019-05-01 DIAGNOSIS — S16.1XXA CERVICAL STRAIN, ACUTE, INITIAL ENCOUNTER: ICD-10-CM
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
## 2019-05-01 LAB
BASOPHILS ABSOLUTE: 0.2 K/UL (ref 0–0.2)
BASOPHILS RELATIVE PERCENT: 1.5 %
EKG ATRIAL RATE: 394 BPM
EKG DIAGNOSIS: NORMAL
EKG Q-T INTERVAL: 346 MS
EKG QRS DURATION: 100 MS
EKG QTC CALCULATION (BAZETT): 434 MS
EKG R AXIS: -56 DEGREES
EKG T AXIS: 55 DEGREES
EKG VENTRICULAR RATE: 95 BPM
EOSINOPHILS ABSOLUTE: 0.7 K/UL (ref 0–0.6)
EOSINOPHILS RELATIVE PERCENT: 4.8 %
HCT VFR BLD CALC: 45.9 % (ref 40.5–52.5)
HEMOGLOBIN: 15.6 G/DL (ref 13.5–17.5)
INR BLD: 2.06 (ref 0.86–1.14)
LYMPHOCYTES ABSOLUTE: 3.7 K/UL (ref 1–5.1)
LYMPHOCYTES RELATIVE PERCENT: 26.8 %
MCH RBC QN AUTO: 31.3 PG (ref 26–34)
MCHC RBC AUTO-ENTMCNC: 34.1 G/DL (ref 31–36)
MCV RBC AUTO: 91.8 FL (ref 80–100)
MONOCYTES ABSOLUTE: 1.2 K/UL (ref 0–1.3)
MONOCYTES RELATIVE PERCENT: 8.6 %
NEUTROPHILS ABSOLUTE: 8 K/UL (ref 1.7–7.7)
NEUTROPHILS RELATIVE PERCENT: 58.3 %
PDW BLD-RTO: 14.3 % (ref 12.4–15.4)
PLATELET # BLD: 191 K/UL (ref 135–450)
PLATELET SLIDE REVIEW: ADEQUATE
PMV BLD AUTO: 8.8 FL (ref 5–10.5)
PROTHROMBIN TIME: 23 SEC (ref 9.8–13)
RBC # BLD: 5 M/UL (ref 4.2–5.9)
SLIDE REVIEW: ABNORMAL
WBC # BLD: 13.6 K/UL (ref 4–11)

## 2019-05-01 PROCEDURE — 6370000000 HC RX 637 (ALT 250 FOR IP): Performed by: PHYSICIAN ASSISTANT

## 2019-05-01 PROCEDURE — 99284 EMERGENCY DEPT VISIT MOD MDM: CPT

## 2019-05-01 PROCEDURE — 73610 X-RAY EXAM OF ANKLE: CPT

## 2019-05-01 PROCEDURE — 93010 ELECTROCARDIOGRAM REPORT: CPT | Performed by: INTERNAL MEDICINE

## 2019-05-01 PROCEDURE — 93005 ELECTROCARDIOGRAM TRACING: CPT | Performed by: PHYSICIAN ASSISTANT

## 2019-05-01 PROCEDURE — 36415 COLL VENOUS BLD VENIPUNCTURE: CPT

## 2019-05-01 PROCEDURE — 70450 CT HEAD/BRAIN W/O DYE: CPT

## 2019-05-01 PROCEDURE — 85025 COMPLETE CBC W/AUTO DIFF WBC: CPT

## 2019-05-01 PROCEDURE — 73560 X-RAY EXAM OF KNEE 1 OR 2: CPT

## 2019-05-01 PROCEDURE — 85610 PROTHROMBIN TIME: CPT

## 2019-05-01 PROCEDURE — 72125 CT NECK SPINE W/O DYE: CPT

## 2019-05-01 RX ORDER — HYDROCODONE BITARTRATE AND ACETAMINOPHEN 5; 325 MG/1; MG/1
1 TABLET ORAL ONCE
Status: DISCONTINUED | OUTPATIENT
Start: 2019-05-01 | End: 2019-05-01

## 2019-05-01 RX ORDER — HYDROCODONE BITARTRATE AND ACETAMINOPHEN 5; 325 MG/1; MG/1
1 TABLET ORAL EVERY 6 HOURS PRN
Qty: 12 TABLET | Refills: 0 | Status: SHIPPED | OUTPATIENT
Start: 2019-05-01 | End: 2019-05-04

## 2019-05-01 RX ORDER — HYDROCODONE BITARTRATE AND ACETAMINOPHEN 5; 325 MG/1; MG/1
2 TABLET ORAL ONCE
Status: COMPLETED | OUTPATIENT
Start: 2019-05-01 | End: 2019-05-01

## 2019-05-01 RX ORDER — ACETAMINOPHEN 500 MG
500 TABLET ORAL ONCE
Status: DISCONTINUED | OUTPATIENT
Start: 2019-05-01 | End: 2019-05-01

## 2019-05-01 RX ADMIN — HYDROCODONE BITARTRATE AND ACETAMINOPHEN 2 TABLET: 5; 325 TABLET ORAL at 15:27

## 2019-05-01 ASSESSMENT — PAIN DESCRIPTION - LOCATION: LOCATION: LEG

## 2019-05-01 ASSESSMENT — ENCOUNTER SYMPTOMS
VOMITING: 0
ABDOMINAL PAIN: 0
SHORTNESS OF BREATH: 0
VISUAL CHANGE: 0

## 2019-05-01 ASSESSMENT — PAIN DESCRIPTION - DESCRIPTORS: DESCRIPTORS: SHARP

## 2019-05-01 ASSESSMENT — PAIN SCALES - GENERAL: PAINLEVEL_OUTOF10: 10

## 2019-05-01 ASSESSMENT — PAIN DESCRIPTION - ORIENTATION: ORIENTATION: INNER;RIGHT;LOWER

## 2019-05-01 NOTE — ED NOTES
EKG interpreted by No att. providers found:    Rhythm: atrial fibrillation - controlled  Rate: 95  Ectopy: none  Conduction: normal  ST Segments: no acute change  T Waves: no acute change  Similar to EKG from March 15, 2019         Winnie Saez MD  05/01/19 0017

## 2019-05-01 NOTE — ED PROVIDER NOTES
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 States his head hurt last night no longer hurting him. No LOC. No vomiting. States neck is a little sore. Had some pain at right low back but has gone away now. No hip pain. No chest pain. No shortness of breath. No abdominal pain. Denies anything else bothering him. Small red ivania at top of scalp with some soreness. No open wounds. No entrance/ exit wounds from shock of electric fence. States he's been shocked by the fence many times before. The history is provided by the patient and the spouse. Fall   The accident occurred yesterday. The fall occurred while standing. There was no blood loss. The point of impact was the head (rt ankle). The pain is present in the head (neck, right ankle and knee). He was ambulatory at the scene. There was no entrapment after the fall. Associated symptoms include headaches. Pertinent negatives include no visual change, no fever, no numbness, no abdominal pain, no vomiting and no loss of consciousness. Review of Systems   Constitutional: Negative for fever. Eyes: Negative for visual disturbance. Respiratory: Negative for shortness of breath. Cardiovascular: Negative for chest pain. Gastrointestinal: Negative for abdominal pain and vomiting. Musculoskeletal: Positive for neck pain. Rt knee and ankle pain   Skin: Negative for wound. Neurological: Positive for headaches. Negative for dizziness, loss of consciousness, syncope, speech difficulty and numbness. Psychiatric/Behavioral: Negative for confusion.          PAST MEDICAL HISTORY   has a past medical history of Arthritis, Asthma, Atrial fibrillation ([facility] [address] BPH (benign prostatic hypertrophy) (2013), CAD (coronary artery disease), Cervical disc disorder, unspecified, Chronic back pain, COPD, Diabetes mellitus ([facility] [address] DM (diabetes mellitus) with complications ([facility] [address] Elevated CPK (2009), Erectile dysfunction, GERD (gastroesophageal reflux disease), Hypertension, Hypotestosteronism, Hypothyroidism, Neuropathy, CLARISSE (obstructive sleep apnea), Paroxysmal atrial fibrillation ([facility] [address] RBBB (right bundle branch block), and Vitamin D deficiency. PAST SURGICAL HISTORY   has a past surgical history that includes back surgery; Foot surgery; cardiovascular stress test; Cardiac catheterization (08/01/2006); Carpal tunnel release; Cataract removal with implant (3/9/2011); Cataract removal with implant; Diagnostic Cardiac Cath Lab Procedure (8/9/13); Colonoscopy (2014); Cholecystectomy (6/10/2015); Endoscopy, colon, diagnostic (06/28/2016); Cardiac catheterization (08/09/2013); and Cardiac catheterization (03/15/2019). FAMILY HISTORY  family history includes Diabetes in his father and mother; Emphysema in his paternal grandfather; Heart Failure in his father and mother; Hypertension in his father and mother; Other in his father and mother; Prostate Cancer in his maternal uncle. SOCIAL HISTORY   reports that he quit smoking about 29 years ago. His smoking use included cigarettes. He has a 20.00 pack-year smoking history. He has never used smokeless tobacco. He reports that he drinks alcohol. He reports that he does not use drugs. HOME MEDICATIONS     Prior to Admission medications    Medication Sig Start Date End Date Taking?  Authorizing Provider   diltiazem (CARDIZEM CD) 360 MG extended release capsule TAKE 1 CAPSULE BY MOUTH  DAILY 4/23/19   Jesu Alejandre MD   lisinopril (PRINIVIL;ZESTRIL) 10 MG tablet Take 1 tablet by mouth daily 3/28/19   Elizabeth Stafford MD   amLODIPine (NORVASC) 2.5 MG tablet Take 1 tablet by mouth daily 3/15/19   Viry Quan MD   metoprolol tartrate (LOPRESSOR) 50 MG tablet Take 1 tablet by mouth 2 times daily TAKE 1 TABLET daily 1/29/19   Gaynelle Siemens, MD   levothyroxine (SYNTHROID) 75 MCG tablet TAKE 1 TABLET BY MOUTH  DAILY 1/17/19   Erica Trent MD   pravastatin (PRAVACHOL) 20 MG tablet TAKE 1 TABLET BY MOUTH  DAILY 1/17/19   Erica Trent MD   pantoprazole (PROTONIX) 40 MG tablet TAKE 1 TABLET BY MOUTH  DAILY 1/17/19   Erica Trent MD   ipratropium-albuterol (DUONEB) 0.5-2.5 (3) MG/3ML SOLN nebulizer solution Inhale 3 mLs into the lungs every 6 hours as needed for Shortness of Breath 1/16/19   Lucio Joy MD   gabapentin (NEURONTIN) 300 MG capsule TAKE 1 CAPSULE BY MOUTH THREE TIMES A DAY 1/11/19   Historical Provider, MD   warfarin (COUMADIN) 5 MG tablet Take 5 mg by mouth Only on wednesday    Historical Provider, MD   ADVAIR DISKUS 250-50 MCG/DOSE AEPB USE 1 INHALATION TWO TIMES  DAILY 10/10/18   Lucio Joy MD   doxazosin (CARDURA) 2 MG tablet Take 1 tablet by mouth daily 8/24/18   Erica Trent MD   sildenafil (VIAGRA) 100 MG tablet Take 1 tablet by mouth as needed for Erectile Dysfunction 8/24/18   Erica Trent MD   albuterol sulfate HFA (PROAIR HFA) 108 (90 Base) MCG/ACT inhaler Inhale 2 puffs into the lungs every 6 hours as needed for Wheezing or Shortness of Breath 5/3/18   Lucio Joy MD   JARDIANCE 25 MG tablet TAKE 1 TABLET EVERY MORNING 9/6/17   Historical Provider, MD   glucose blood VI test strips (CRUZITO CONTOUR NEXT TEST) strip Testing 4 x a day DX: E11.42 9/25/17   Erica Trent MD   insulin aspart (NOVOLOG FLEXPEN) 100 UNIT/ML injection vial Inject 25 Units into the skin 3 times daily Indications: 25-30 units 3 times a day Dispense flexpens 9/25/17   Erica Trent MD   insulin detemir (LEVEMIR FLEXTOUCH) 100 UNIT/ML injection pen Inject 50 Units into the skin 2 times daily 8/7/17   Erica Trent MD   warfarin (COUMADIN) 10 MG tablet Take 10 mg by mouth daily Every day except Wednesday. Historical Provider, MD   meclizine (ANTIVERT) 25 MG tablet Take 1 tablet by mouth 3 times daily as needed for Dizziness 12/22/16   Mary Connolly MD   Misc. Devices (Barbie Dixon) MISC Please provide pt with Acapella, patient to use 10 breath QID. 11/22/16   ESME Hnery - CNP   Albiglutide 50 MG PEN Inject into the skin Use 1 time weekly - Dr. Tiburcio Ibrahim Provider, MD   fluticasone [facility]) 50 MCG/ACT nasal spray 2 sprays by Nasal route daily 7/13/15   Toña Augustine MD   Insulin Pen Needle (NOVOFINE) 30G X 8 MM MISC APPLY 1 EACH TOPICALLY 2 TIMES DAILY. 7/7/15   Jesu Alejandre MD   nystatin (MYCOSTATIN) powder Apply 3 times daily. 5/12/14   Jesu Alejandre MD        ALLERGIES  is allergic to lyrica [pregabalin]; reglan [metoclopramide]; and simvastatin. /70   Pulse 102   Temp 98 °F (36.7 °C) (Oral)   Resp 14   Ht 5' 10\" (1.778 m)   Wt 240 lb (108.9 kg)   SpO2 98%   BMI 34.44 kg/m²     Physical Exam   Constitutional: He is oriented to person, place, and time. He appears well-developed and well-nourished. Non-toxic appearance. He does not have a sickly appearance. HENT:   Head: Normocephalic and atraumatic. Head is without raccoon's eyes, without Moseley's sign and without laceration. Right Ear: Tympanic membrane and ear canal normal. No hemotympanum. Left Ear: Tympanic membrane and ear canal normal. No hemotympanum. Mouth/Throat: Oropharynx is clear and moist and mucous membranes are normal. No posterior oropharyngeal edema or posterior oropharyngeal erythema. Eyes: Pupils are equal, round, and reactive to light. Conjunctivae and EOM are normal.   Neck: Normal range of motion. Neck supple. Cardiovascular: Normal rate, regular rhythm, normal heart sounds and intact distal pulses. Pulses:       Radial pulses are 2+ on the right side, and 2+ on the left side.         Dorsalis pedis pulses are 2+ on the right side. Posterior tibial pulses are 2+ on the right side, and 2+ on the left side. Pulmonary/Chest: Effort normal and breath sounds normal. No stridor. No respiratory distress. He has no wheezes. He has no rales. Abdominal: Soft. Bowel sounds are normal. There is no tenderness. There is no rigidity, no rebound and no guarding. Musculoskeletal:        Right knee: He exhibits decreased range of motion. He exhibits no effusion and no deformity. Tenderness found. Right ankle: He exhibits decreased range of motion and swelling. Tenderness. Medial malleolus tenderness found. Left ankle: He exhibits normal range of motion and no deformity. No head of 5th metatarsal and no proximal fibula tenderness found. Cervical back: He exhibits pain. He exhibits normal range of motion, no tenderness, no bony tenderness, no swelling, no deformity and normal pulse. Thoracic back: He exhibits no tenderness and no bony tenderness. Lumbar back: He exhibits no tenderness and no bony tenderness. Legs:  Neurological: He is alert and oriented to person, place, and time. He has normal strength. No cranial nerve deficit or sensory deficit. He exhibits normal muscle tone. Coordination normal. GCS eye subscore is 4. GCS verbal subscore is 5. GCS motor subscore is 6. Skin: Skin is warm and dry. Psychiatric: He has a normal mood and affect. His speech is normal and behavior is normal. Thought content normal. Cognition and memory are normal.   Vitals reviewed.       Procedures    MDM  Number of Diagnoses or Management Options  Cervical strain, acute, initial encounter:   Closed head injury, initial encounter:   Fall, initial encounter:   Right ankle injury, initial encounter:   Sprain of right knee/leg, initial encounter:      Amount and/or Complexity of Data Reviewed  Clinical lab tests: ordered and reviewed  Tests in the radiology section of CPT®: ordered and reviewed  Independent visualization of images, tracings, or specimens: yes    Patient Progress  Patient progress: stable    Results for orders placed or performed during the hospital encounter of 05/01/19   Protime-INR   Result Value Ref Range    Protime 23.0 (H) 9.8 - 13.0 sec    INR 2.06 (H) 0.86 - 1.14   CBC Auto Differential   Result Value Ref Range    WBC 13.6 (H) 4.0 - 11.0 K/uL    RBC 5.00 4.20 - 5.90 M/uL    Hemoglobin 15.6 13.5 - 17.5 g/dL    Hematocrit 45.9 40.5 - 52.5 %    MCV 91.8 80.0 - 100.0 fL    MCH 31.3 26.0 - 34.0 pg    MCHC 34.1 31.0 - 36.0 g/dL    RDW 14.3 12.4 - 15.4 %    Platelets 734 583 - 170 K/uL    MPV 8.8 5.0 - 10.5 fL    PLATELET SLIDE REVIEW Adequate     SLIDE REVIEW see below     Neutrophils % 58.3 %    Lymphocytes % 26.8 %    Monocytes % 8.6 %    Eosinophils % 4.8 %    Basophils % 1.5 %    Neutrophils # 8.0 (H) 1.7 - 7.7 K/uL    Lymphocytes # 3.7 1.0 - 5.1 K/uL    Monocytes # 1.2 0.0 - 1.3 K/uL    Eosinophils # 0.7 (H) 0.0 - 0.6 K/uL    Basophils # 0.2 0.0 - 0.2 K/uL   EKG 12 Lead   Result Value Ref Range    Ventricular Rate 95 BPM    Atrial Rate 394 BPM    QRS Duration 100 ms    Q-T Interval 346 ms    QTc Calculation (Bazett) 434 ms    R Axis -56 degrees    T Axis 55 degrees    Diagnosis       Atrial fibrillationLeft axis deviationNonspecific ST abnormalityWhen compared with ECG of 15-MAR-2019 08:03,No significant change was foundConfirmed by ALISHA Wilhelm MD ([id number]) on 5/1/2019 6:05:47 PM     Xr Knee Right (1-2 Views)    Result Date: 5/1/2019  EXAMINATION: 2 XRAY VIEWS OF THE RIGHT KNEE 5/1/2019 2:16 pm COMPARISON: None. HISTORY: ORDERING SYSTEM PROVIDED HISTORY: fall r/o fx TECHNOLOGIST PROVIDED HISTORY: Reason for exam:->fall r/o fx Ordering Physician Provided Reason for Exam: hit an electric fence, + loc, right knee and right ankle pain Acuity: Acute FINDINGS: No acute fracture or dislocation. No focal osseous lesion. No evidence of joint effusion. No focal soft tissue abnormality.   Mild medial compartment and patellofemoral compartment osteoarthritis. No acute abnormality of the knee. Xr Ankle Right (min 3 Views)    Result Date: 5/1/2019  EXAMINATION: 3 XRAY VIEWS OF THE RIGHT ANKLE 5/1/2019 2:16 pm COMPARISON: None. HISTORY: ORDERING SYSTEM PROVIDED HISTORY: Trauma, R/O fx TECHNOLOGIST PROVIDED HISTORY: Reason for exam:->Trauma, R/O fx Ordering Physician Provided Reason for Exam: hit an electric fence, + loc, right knee and right ankle pain Acuity: Acute Type of Exam: Initial FINDINGS: The ankle mortise is intact. Vascular calcifications. No acute fracture or subluxation. Small plantar spur. No acute osseous abnormality. Ct Head Wo Contrast    Result Date: 5/1/2019  EXAMINATION: CT OF THE HEAD WITHOUT CONTRAST  5/1/2019 1:56 pm TECHNIQUE: CT of the head was performed without the administration of intravenous contrast. Dose modulation, iterative reconstruction, and/or weight based adjustment of the mA/kV was utilized to reduce the radiation dose to as low as reasonably achievable. COMPARISON: 01/08/2019, 06/19/2017, 08/26/2012 HISTORY: ORDERING SYSTEM PROVIDED HISTORY: HEAD INJURY MILD OR MODERATE ACUTE, NO NEUROLOGICAL DEFICIT TECHNOLOGIST PROVIDED HISTORY: Has a \"code stroke\" or \"stroke alert\" been called? ->No FINDINGS: BRAIN/VENTRICLES: There is no acute intracranial hemorrhage, mass effect or midline shift. No abnormal extra-axial fluid collection. The gray-white differentiation is maintained without evidence of an acute infarct. There is no evidence of hydrocephalus. There is stable mild age-appropriate atrophy. There are stable bilateral basal ganglia calcifications. No focus of acute abnormal brain attenuation is identified. ORBITS: The visualized portion of the orbits demonstrate no acute abnormality. SINUSES: There is a mucous retention cyst within the left maxillary sinus. The paranasal sinuses are otherwise clear. The mastoids are clear bilaterally.  SOFT TISSUES/SKULL: x-ray is negative for fracture or dislocation. Right ankle x-ray there is a tiny area right medial malleolus seen on one view only that could represent a chip fracture. This is in the area of patient's pain and swelling. We will immobilize an ankle brace he'll continue crutches. Pain medication provided. Advised ice and resting. Referral to orthopedics for follow-up. Advised return to the ER for any worsening symptoms. He understands and agrees. I estimate there is LOW risk for COMPARTMENT SYNDROME, INTRACRANIAL HEMORRHAGE OR EDEMA, SUBDURAL OR EPIDURAL HEMATOMA, TENDON or NEUROVASCULAR INJURY, thus I consider the discharge disposition reasonable. Also, there is no evidence or peritonitis, sepsis, or toxicity. Please note that this chart was generated using Dragon dictation software.  Although every effort was made to ensure the accuracy of this automated transcription, some errors in transcription may have occurred       Mitchell Lino PA-C  05/01/19 [id number]

## 2019-05-01 NOTE — TELEPHONE ENCOUNTER
[facility] requesting a call to the office. I will mail the pt a letter to have him contact the office.

## 2019-05-06 RX ORDER — LEVOTHYROXINE SODIUM 0.07 MG/1
TABLET ORAL
Qty: 90 TABLET | Refills: 0 | Status: SHIPPED | OUTPATIENT
Start: 2019-05-06 | End: 2019-07-16 | Stop reason: SDUPTHER

## 2019-05-06 NOTE — TELEPHONE ENCOUNTER
Refilled medication per verbal order from provider.   Future appt scheduled 07/22/2019  Last appt 01/03/2019

## 2019-05-07 ENCOUNTER — OFFICE VISIT (OUTPATIENT)
Dept: FAMILY MEDICINE CLINIC | Age: 69
End: 2019-05-07
Payer: COMMERCIAL

## 2019-05-07 VITALS
HEART RATE: 72 BPM | TEMPERATURE: 97.8 F | OXYGEN SATURATION: 95 % | DIASTOLIC BLOOD PRESSURE: 65 MMHG | SYSTOLIC BLOOD PRESSURE: 106 MMHG

## 2019-05-07 DIAGNOSIS — J45.41 MODERATE PERSISTENT ASTHMA WITH ACUTE EXACERBATION: Primary | ICD-10-CM

## 2019-05-07 PROCEDURE — 1123F ACP DISCUSS/DSCN MKR DOCD: CPT | Performed by: FAMILY MEDICINE

## 2019-05-07 PROCEDURE — G8417 CALC BMI ABV UP PARAM F/U: HCPCS | Performed by: FAMILY MEDICINE

## 2019-05-07 PROCEDURE — G8427 DOCREV CUR MEDS BY ELIG CLIN: HCPCS | Performed by: FAMILY MEDICINE

## 2019-05-07 PROCEDURE — 99213 OFFICE O/P EST LOW 20 MIN: CPT | Performed by: FAMILY MEDICINE

## 2019-05-07 PROCEDURE — 4040F PNEUMOC VAC/ADMIN/RCVD: CPT | Performed by: FAMILY MEDICINE

## 2019-05-07 PROCEDURE — 1036F TOBACCO NON-USER: CPT | Performed by: FAMILY MEDICINE

## 2019-05-07 PROCEDURE — 3017F COLORECTAL CA SCREEN DOC REV: CPT | Performed by: FAMILY MEDICINE

## 2019-05-07 RX ORDER — PREDNISONE 10 MG/1
TABLET ORAL
Qty: 32 TABLET | Refills: 0 | Status: SHIPPED | OUTPATIENT
Start: 2019-05-07 | End: 2021-11-04 | Stop reason: SDUPTHER

## 2019-05-07 RX ORDER — AMOXICILLIN AND CLAVULANATE POTASSIUM 875; 125 MG/1; MG/1
1 TABLET, FILM COATED ORAL EVERY 12 HOURS
Qty: 20 TABLET | Refills: 0 | Status: SHIPPED | OUTPATIENT
Start: 2019-05-07 | End: 2019-05-17

## 2019-05-14 RX ORDER — PANTOPRAZOLE SODIUM 40 MG/1
40 TABLET, DELAYED RELEASE ORAL DAILY
Qty: 90 TABLET | Refills: 1 | Status: SHIPPED | OUTPATIENT
Start: 2019-05-14 | End: 2019-10-01 | Stop reason: SDUPTHER

## 2019-05-20 ENCOUNTER — OFFICE VISIT (OUTPATIENT)
Dept: ORTHOPEDIC SURGERY | Age: 69
End: 2019-05-20
Payer: COMMERCIAL

## 2019-05-20 VITALS
SYSTOLIC BLOOD PRESSURE: 128 MMHG | HEART RATE: 91 BPM | WEIGHT: 240.08 LBS | BODY MASS INDEX: 34.37 KG/M2 | DIASTOLIC BLOOD PRESSURE: 83 MMHG | HEIGHT: 70 IN

## 2019-05-20 DIAGNOSIS — M25.571 RIGHT ANKLE PAIN, UNSPECIFIED CHRONICITY: Primary | ICD-10-CM

## 2019-05-20 DIAGNOSIS — M25.571 CHRONIC PAIN OF RIGHT ANKLE: ICD-10-CM

## 2019-05-20 DIAGNOSIS — S93.491D SPRAIN OF ANTERIOR TALOFIBULAR LIGAMENT OF RIGHT ANKLE, SUBSEQUENT ENCOUNTER: ICD-10-CM

## 2019-05-20 DIAGNOSIS — G89.29 CHRONIC PAIN OF RIGHT ANKLE: ICD-10-CM

## 2019-05-20 PROBLEM — S93.491A SPRAIN OF ANTERIOR TALOFIBULAR LIGAMENT OF RIGHT ANKLE: Status: ACTIVE | Noted: 2019-05-20

## 2019-05-20 PROCEDURE — 1036F TOBACCO NON-USER: CPT | Performed by: FAMILY MEDICINE

## 2019-05-20 PROCEDURE — G8427 DOCREV CUR MEDS BY ELIG CLIN: HCPCS | Performed by: FAMILY MEDICINE

## 2019-05-20 PROCEDURE — 1123F ACP DISCUSS/DSCN MKR DOCD: CPT | Performed by: FAMILY MEDICINE

## 2019-05-20 PROCEDURE — 4040F PNEUMOC VAC/ADMIN/RCVD: CPT | Performed by: FAMILY MEDICINE

## 2019-05-20 PROCEDURE — 3017F COLORECTAL CA SCREEN DOC REV: CPT | Performed by: FAMILY MEDICINE

## 2019-05-20 PROCEDURE — G8417 CALC BMI ABV UP PARAM F/U: HCPCS | Performed by: FAMILY MEDICINE

## 2019-05-20 PROCEDURE — 99203 OFFICE O/P NEW LOW 30 MIN: CPT | Performed by: FAMILY MEDICINE

## 2019-05-20 PROCEDURE — L1902 AFO ANKLE GAUNTLET PRE OTS: HCPCS | Performed by: FAMILY MEDICINE

## 2019-05-20 RX ORDER — PRAVASTATIN SODIUM 20 MG
TABLET ORAL
Qty: 90 TABLET | Refills: 0 | Status: SHIPPED | OUTPATIENT
Start: 2019-05-20 | End: 2019-09-03 | Stop reason: SDUPTHER

## 2019-05-21 NOTE — PROGRESS NOTES
Chief Complaint    Initial Consultation Ankle Pain (NP RIGHT ANKLE)    Initial consultation right ankle pain status post ankle inversion 5/1/2019 with altalgia    History of Present Illness:  Wili Clemente is a 81 y.o. 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 Lele Dobson who sees Dr. Lauren Khalil for his diabetes who is being seen today upon referral from [facility] for evaluation of an injury to his right ankle. He states that on 5/1/2019 he was attempting to reach through a gate abd reaching through the gate to pull a watering trough towards him when it was pulled in the opposite direction until he fell forward and his head struck an electrified fence causing him to fall backwards and likely sustained a rotational inversion injury about his right ankle. He is uncertain as to the exact mechanism of injury as he believes he may have blacked out very briefly from striking the electrified fence. Immediately though he did have pain laterally involving the ankle with limited ability to bear weight. He was actually seen at [facility] where x-rays were negative for fracture and he was placed in an ankle brace and encouraged to take Tylenol. Overall his symptoms have improved about 50% and he is having more soreness and stiffness and actual pain. Walking on uneven surfaces can produce pain in the range of 5-6 out of 10 however. His endocrinologist actually discouraged him from using the boot. He has not yet had therapy. No sensations of loose bodies locking or catching. His diabetes is under reasonable control per the patient and he believes his last A1c was 6.2. His swelling has improved. He believes he may have sprained his ankle in the past.  Denies sensation of loose bodies locking or catching.   Previous to his injury on 5/1/2019, he was in vestibular imbalance retraining however he has not been able to dissipate in this with subacute ankle injury and his therapist recommended that he put this on hold until his ankle is better. He is being seen today for orthopedic and sports consultation with imaging. Medical History    Patient's medications, allergies, past medical, surgical, social and family histories were reviewed and updated as appropriate. Review of Systems    Pertinent items are noted in HPI  Review of systems reviewed from Patient History Form dated on 5/20/2019 and available in the patient's chart under the Media tab. Vital Signs  Vitals:    05/20/19 1333   BP: 128/83   Pulse: 91       General Exam:     Constitutional: Patient is adequately groomed with no evidence of malnutrition  DTRs: Deep tendon reflexes are intact  Mental Status: The patient is oriented to time, place and person. The patient's mood and affect are appropriate. Lymphatic: The lymphatic examination bilaterally reveals all areas to be without enlargement or induration. Vascular: Examination reveals no swelling or calf tenderness. Peripheral pulses are palpable and 2+. Neurological: The patient has good coordination. There is no weakness or sensory deficit. Ankle Examination  Inspection:  There is no high-grade deformity low does have residual 1+ swelling with some resolving lateral ecchymosis. Palpation:  He is most clinically tender at this time over the ATFL and CF ligament was complex. There does not appear to be high-grade effusion or substantial osseous tenderness at this point and no medial tenderness noted. Rang of Motion:  He does have about a 50% reduction and ankle motion. Strength:  Post-immobilization stiffness and weakness once again noted with strength being about 4-5 with eversion and dorsiflexion. Special Tests:  1+ month. Drawer test.  Negative patellar tilt Mortensen's testing. Skin: There are no rashes, ulcerations or lesions. Distal motor sensory and vascular exam is intact.     Gait: Moderate Patient was prescribed a Atiya Ankle Brace. The right ankle will require stabilization / immobilization from this semi-rigid / rigid orthosis to improve their function. The orthosis will assist in protecting the affected area, provide functional support and facilitate healing. Patient was instructed to progress ambulation weight bearing as tolerated in the device. The patient was educated and fit by a healthcare professional with expert knowledge and specialization in brace application while under the direct supervision of the treating physician. Verbal and written instructions for the use of and application of this item were provided. They were instructed to contact the office immediately should the brace result in increased pain, decreased sensation, increased swelling or worsening of the condition. Treatment Plan:  Treatment options were discussed with Luci Davey. We did review his plain films and exam findings. He is now almost 3 weeks out from a significant at least grade 2 right lateral ankle sprain. Overall he would rate his improvement currently at about 50%. He is still struggling to walk to some degree on uneven surfaces and climb stairs and as such we did place him in a lace up ankle brace  He may utilize one cane or crutch as needed in the near term. I would like for him to start physical therapy formally at the surgery in the office which is more convenient for him. He is currently on Coumadin and therefore we'll get him started with Voltaren gel 4 g 4 times a day to his lateral ankle as he goes through therapy. We will see him back in 3-4 weeks for follow-up. Icing and activity modification was discussed. This dictation was performed with a verbal recognition program (DRAGON) and it was checked for errors. It is possible that there are still dictated errors within this office note. If so, please bring any errors to my attention for an addendum.  All efforts were made to ensure that this office note is accurate.

## 2019-05-22 ENCOUNTER — ANTI-COAG VISIT (OUTPATIENT)
Dept: PHARMACY | Age: 69
End: 2019-05-22
Payer: COMMERCIAL

## 2019-05-22 DIAGNOSIS — I48.20 CHRONIC ATRIAL FIBRILLATION (HCC): ICD-10-CM

## 2019-05-22 LAB — INR BLD: 5

## 2019-05-22 PROCEDURE — 85610 PROTHROMBIN TIME: CPT | Performed by: FAMILY MEDICINE

## 2019-05-22 PROCEDURE — 99211 OFF/OP EST MAY X REQ PHY/QHP: CPT | Performed by: FAMILY MEDICINE

## 2019-05-29 ENCOUNTER — HOSPITAL ENCOUNTER (OUTPATIENT)
Dept: PHYSICAL THERAPY | Age: 69
Setting detail: THERAPIES SERIES
Discharge: HOME | End: 2019-05-29
Payer: COMMERCIAL

## 2019-05-29 PROCEDURE — 97161 PT EVAL LOW COMPLEX 20 MIN: CPT | Performed by: PHYSICAL THERAPIST

## 2019-05-29 PROCEDURE — 97110 THERAPEUTIC EXERCISES: CPT | Performed by: PHYSICAL THERAPIST

## 2019-05-29 NOTE — PLAN OF CARE
[]Radiating []Localized []other:     Numbness/Tingling: Yes. . From his DM II in his foot. Occupation/School:  [employer]    Living Status/Prior Level of Function: Independent with ADLs and IADLs    OBJECTIVE:     ROM LEFT RIGHT   HIP Flex     HIP Abd     HIP Ext     HIP IR     HIP ER     Knee ext     Knee Flex     Ankle PF 55 °   50 °    Ankle DF 10 °  3 °    Ankle In 35 °  28 °    Ankle Ev 12 °  8 °    Strength  LEFT RIGHT   HIP Flexors     HIP Abductors     HIP Ext     Hip ER     Knee EXT (quad)     Knee Flex (HS)     Ankle DF     Ankle PF     Ankle Inv     Ankle EV          Balance (up to 10 sec) LEFT RIGHT   Feet Together     Off Set Stance     Tandem Stance     Single Limb          Circumference   55 cm fig 8   56 cm fig. 8     Reflexes/Sensation:    []Dermatomes/Myotomes intact    []Reflexes equal and normal bilaterally   []Other:    Joint mobility:    [x]Normal    []Hypo   []Hyper    Palpation/Observation: Pain over the ATF, CF and peroneal tendon    Functional Mobility/Transfers: Stand by assist with all transfers. Posture: WNL    Bandages/Dressings/Incisions: na    Gait: (include devices/WB status) Decreased stance time bilaterally, decreased stride length bilaterally. Orthopedic Special Tests:                        [x] Patient history, allergies, meds reviewed. Medical chart reviewed. See intake form. Review Of Systems (ROS):  [x]Performed Review of systems (Integumentary, CardioPulmonary, Neurological) by intake and observation. Intake form has been scanned into medical record. Patient has been instructed to contact their primary care physician regarding ROS issues if not already being addressed at this time.       Co-morbidities/Complexities (which will affect course of rehabilitation):   []None           Arthritic conditions   []Rheumatoid arthritis (M05.9)  []Osteoarthritis (M19.91)   Cardiovascular conditions   [x]Hypertension (I10)  []Hyperlipidemia (E78.5)  []Angina pectoris (I20)  []Atherosclerosis (I70)   Musculoskeletal conditions   []Disc pathology   []Congenital spine pathologies   []Prior surgical intervention  []Osteoporosis (M81.8)  []Osteopenia (M85.8)   Endocrine conditions   []Hypothyroid (E03.9)  []Hyperthyroid Gastrointestinal conditions   []Constipation (E94.66)   Metabolic conditions   [x]Morbid obesity (E66.01)  [x]Diabetes type 1(E10.65) or 2 (E11.65)   []Neuropathy (G60.9)     Pulmonary conditions   []Asthma (J45)  []Coughing   []COPD (J44.9)   Psychological Disorders  []Anxiety (F41.9)  []Depression (F32.9)   []Other:   []Other:          Barriers to/and or personal factors that will affect rehab potential:              []Age  []Sex              [x]Motivation/Lack of Motivation                        [x]Co-Morbidities              []Cognitive Function, education/learning barriers              []Environmental, home barriers              []profession/work barriers  []past PT/medical experience  []other:  Justification:     Falls Risk Assessment (30 days):   [x] Falls Risk assessed and no intervention required.   [] Falls Risk assessed and Patient requires intervention due to being higher risk   TUG score (>12s at risk):     [] Falls education provided      G-Codes:       ASSESSMENT:   Functional Impairments:     []Noted lumbar/proximal hip/LE joint hypomobility   [x]Decreased LE functional ROM   [x]Decreased core/proximal hip strength and neuromuscular control   [x]Decreased LE functional strength   [x]Reduced balance/proprioceptive control   []other:      Functional Activity Limitations (from functional questionnaire and intake)   []Reduced ability to tolerate prolonged functional positions   []Reduced ability or difficulty with changes of positions or transfers between positions   []Reduced ability to maintain good posture and demonstrate good body mechanics with sitting, bending, and lifting   [x]Reduced ability to sleep   [x] Reduced ability or tolerance with driving and/or computer work   [x]Reduced ability to perform lifting, carrying tasks   [x]Reduced ability to squat   [x]Reduced ability to forward bend   [x]Reduced ability to ambulate prolonged functional periods/distances/surfaces   [x]Reduced ability to ascend/descend stairs   [x]Reduced ability to run, hop, cut or jump   []other:    Participation Restrictions   []Reduced participation in self care activities   [x]Reduced participation in home management activities   []Reduced participation in work activities   [x]Reduced participation in social activities. []Reduced participation in sport/recreation activities. Classification :     []Signs/symptoms consistent with post-surgical status including decreased ROM, strength and function.    [x]Signs/symptoms consistent with joint sprain/strain   []Signs/symptoms consistent with patella-femoral syndrome   []Signs/symptoms consistent with knee OA/hip OA   []Signs/symptoms consistent with internal derangement of knee/Hip   []Signs/symptoms consistent with functional hip weakness/NMR control      []Signs/symptoms consistent with tendinitis/tendinosis    []signs/symptoms consistent with pathology which may benefit from Dry needling      []other:      Prognosis/Rehab Potential:      []Excellent   [x]Good    []Fair   []Poor    Tolerance of evaluation/treatment:    []Excellent   [x]Good    []Fair   []Poor    Physical Therapy Evaluation Complexity Justification   [x] A history of present problem with:  [] no personal factors and/or comorbidities that impact the plan of care;  [x]1-2 personal factors and/or comorbidities that impact the plan of care  []3 personal factors and/or comorbidities that impact the plan of care  [x] An examination of body systems using standardized tests and measures addressing any of the following: body structures and functions (impairments), activity limitations, and/or participation restrictions;:  [] a total of 1-2 or more elements   [x] a total of 3 or more elements   [] a total of 4 or more elements   [x] A clinical presentation with:  [x] stable and/or uncomplicated characteristics   [] evolving clinical presentation with changing characteristics  [] unstable and unpredictable characteristics;   [x] Clinical decision making of [x] low, [] moderate, [] high complexity using standardized patient assessment instrument and/or measurable assessment of functional outcome. [x] EVAL (LOW) 11079 (typically 20 minutes face-to-face)  [] EVAL (MOD) 25103 (typically 30 minutes face-to-face)  [] EVAL (HIGH) 47557 (typically 45 minutes face-to-face)  [] RE-EVAL     PLAN  Frequency/Duration:  1-2 days per week for 12 weeks:  Interventions:  [x]  Therapeutic exercise including: strength training, ROM, for Lower extremity and core   [x]  NMR activation and proprioception for LE, Glutes and Core   [x]  Manual therapy as indicated for LE, Hip and spine to include: Dry Needling/IASTM, STM, PROM, Gr I-IV mobilizations, manipulation. [x] Modalities as needed that may include: thermal agents, E-stim, Biofeedback, US, iontophoresis as indicated  [x] Patient education on joint protection, postural re-education, activity modification, progression of HEP. HEP instruction: (see scanned forms)    GOALS:    Therapist goals for Patient:   Short Term Goals: To be achieved in: 2 weeks  1. Independent in HEP and progression per patient tolerance, in order to prevent re-injury. 2. Patient will have a decrease in pain to facilitate improvement in movement, function, and ADLs as indicated by Functional Deficits. Long Term Goals: To be achieved in: 12 weeks  1. Disability index score of 10% or less for the LEFS to assist with reaching prior level of function. 2. Patient will demonstrate increased AROM to equal the opposite side bilaterally to allow for proper joint functioning as indicated by patients Functional Deficits.    3. Patient will demonstrate an increase in strength to LE MMT scores to match bilaterally and allow for proper functional mobility as indicated by patients Functional Deficits. 4. Patient will return to all transfers, work activities, and functional activities without increased symptoms or restriction. 5. Patient will have 0/10 pain with ADL's.      Electronically signed by:  Violet Corrigan PT

## 2019-05-29 NOTE — FLOWSHEET NOTE
[address] [place] [address] [facility]    Physical Therapy Daily Treatment Note  Date:  2019    Patient Name:  Nicolás Vance    :  1950  MRN: [id number]  Medical/Treatment Diagnosis Information:  · Diagnosis: R ankle sprain  · Treatment Diagnosis: M25.571 / S23.639V  Insurance/Certification information:  PT Insurance Information: [place] - 25 visits  Physician Information:  Referring Practitioner: Pamela Market of care signed (Y/N):     Date of Patient follow up with Physician: 19    G-Code (if applicable):      Date G-Code Applied:  2019       Progress Note: [x]  Yes  []  No      Latex Allergy:  [x]NO      []YES  Preferred Language for Healthcare:   [x]English       []other:    Visit # Insurance Allowable        Pain level:  3-810     SUBJECTIVE:  See eval    OBJECTIVE: See eval  Observation:   Test measurements:    Patient educated on following:    RESTRICTIONS/PRECAUTIONS: DM II    Exercises/Interventions:   Therapeutic Ex Wt/Sets/Reps/Hold Notes   Bike     Eliptical               4 way ankle TB x30 ea Orange ??, ?? Long sitting gastroc/soleus 3x30\"    Seated HR/TR x30    ABC's x2                                                                Manual     Gr I-II mobs for tissue reactivity 5 min    PROM-all planes     GR III-IV mobs for arthrokinematics     Lumbar/long axis distraction     Thoracic PA mobs     PNF for strengthening     PNF for agonist/antagonist inhibition          Pt education/reminders 10 min Provided biomechanics/ergonomics training to reduce stress across injured/healing structures. HEP review     Therapeutic Exercise and NMR EXR  [x] (26677) Provided verbal/tactile cueing for activities related to strengthening, flexibility, endurance, ROM for improvements in LE, proximal hip, and core control with self care, mobility, lifting, ambulation.   [x] (16339) Provided verbal/tactile cueing for activities related to improving balance, coordination, kinesthetic sense, posture, motor skill, proprioception  to assist with LE, proximal hip, and core control in self care, mobility, lifting, ambulation and eccentric single leg control. Home Exercise Program:    [x] (95874) Reviewed/Progressed HEP activities related to strengthening, flexibility, endurance, ROM of core, proximal hip and LE for functional self-care, mobility, lifting and ambulation/stair navigation   [x] (06771)Reviewed/Progressed HEP activities related to improving balance, coordination, kinesthetic sense, posture, motor skill, proprioception of core, proximal hip and LE for self care, mobility, lifting, and ambulation/stair navigation      Manual Treatments:  PROM / STM / Oscillations-Mobs:  G-I, II, III, IV (PA's, Inf., Post.)  [x] (07517) Provided manual therapy to mobilize LE, proximal hip and/or LS spine soft tissue/joints for the purpose of modulating pain, promoting relaxation,  increasing ROM, reducing/eliminating soft tissue swelling/inflammation/restriction, improving soft tissue extensibility and allowing for proper ROM for normal function with self care, mobility, lifting and ambulation. Modalities: na      Charges:  Timed Code Treatment Minutes: 30   Total Treatment Minutes:    Start Time (BWC)  Stop TIme (BWC)  Procedures (BWC) 50             [x] EVAL (LOW) 98633 (typically 20 minutes face-to-face)  [] EVAL (MOD) 82988 (typically 30 minutes face-to-face)  [] EVAL (HIGH) 34131 (typically 45 minutes face-to-face)  [] RE-EVAL     [x] WW(47306) x  2   [] Ionto  [] NMR (80007) x      [] ES (unattended)  [] Manual (79113) x       [] Mech Traction  [] TA: x (64970)         [] Other:      GOALS:  Goals for Patient:   Short Term Goals: To be achieved in: 2 weeks  1. Independent in HEP and progression per patient tolerance, in order to prevent re-injury.    2. Patient will have a decrease in pain to facilitate improvement in movement, function, and ADLs as indicated by Functional Deficits.     Long Term Goals: To be achieved in: 12 weeks  1. Disability index score of 10% or less for the LEFS to assist with reaching prior level of function. 2. Patient will demonstrate increased AROM to equal the opposite side bilaterally to allow for proper joint functioning as indicated by patients Functional Deficits. 3. Patient will demonstrate an increase in strength to LE MMT scores to match bilaterally and allow for proper functional mobility as indicated by patients Functional Deficits. 4. Patient will return to all transfers, work activities, and functional activities without increased symptoms or restriction. 5. Patient will have 0/10 pain with ADL's. Goals that are underlined signify the goal has been accomplished. Progression Towards Functional goals:  [] Patient is progressing as expected towards functional goals listed. [] Progression is slowed due to complexities listed. [] Progression has been slowed due to co-morbidities.   [x] Plan just implemented, too soon to assess goals progression  [] Other:     ASSESSMENT:  See eval    Treatment/Activity Tolerance:   [x] Patient tolerated treatment well [] Patient limited by fatique  [] Patient limited by pain  [] Patient limited by other medical complications  [] Other:     Prognosis: [] Good [] Fair  [] Poor    Patient Requires Follow-up: [x] Yes  [] No    PLAN: See eval  [] Continue per plan of care [] Alter current plan (see comments)  [x] Plan of care initiated [] Hold pending MD visit [] Discharge    Electronically signed by: Jackson Jackson PT

## 2019-06-02 NOTE — PROGRESS NOTES
[address]   Cardiac Followup    Referring Provider:  Elenita Peter MD     Chief Complaint   Patient presents with    Follow Up After Procedure     s/p cath [address] 03/15/2019, groin site healed- w/o stents    Atrial Fibrillation    Coronary Artery Disease    Hyperlipidemia    Hypertension    Results     ekg 05/01/2019, echo 03/09/2019, nino 03/07/2019    Discuss Labs     in chart    Shoulder Pain    Shortness of Breath     for yrs    Dizziness     vertigo    Edema     once in awhile feet & hands    Fatigue      Subjective: Patient is being seen today for cardiology follow up for mild non-obstructive CAD,  HTN, and chronic afib;  C/O SOB      Past Medical History:    Mr. John Piper is a 70yo male with PMH mild non-obstructive CAD, GERD, HTN, and chronic afib on coumadin. Note LHC on 8/1/06 showed LVEF of 60% with 20% stenosis in the 1st diagonal; LI in the LAD, and very mild non-obstructive CAD. [address] manages his PT/INR and adjusts his medications. He underwent  cardiac cath on 8/9/13 which showed mild non-obstructive disease (20% LM, 30% LAD, <20% CCx, 20% RCA, and LVEF=50%) managed medically. No need for PCI. Most recent SAJI BLE 1/4/2016 showed no evidence of stenosis in BLE. Normal SAJI's ankle-brachial  measured at 1.19 on the right and 1.25 on the left. Most recent Cardiac event monitor 6/20-7/3/17 AFIB showed an average HR of 85 ()  was brief. Carotid US 2/22/18 was negative for blockage    EKG 1/8/19 Atrial fibrillation Left axis deviation. Due to SOB complaints he underwent most recent Lexiscan myoview stress test 3/7/19 LVEF 54%  Apical hypokinesis  Fixed apical defect likely due to scar; No ischemia. Most recent ECHO 3/7/19 showed EF=35-40% with global HK (2012 and 2017 EF=55-60%).   Due to worsening LVEF and SOB [facility] LM: distal 40% shelf like lesion LAD: ERNESTO-2 sluggish flow, luminals LCX: small, luminals RCA: dominant, that he quit smoking about 29 years ago. His smoking use included cigarettes. He has a 20.00 pack-year smoking history. He has never used smokeless tobacco. He reports that he drinks alcohol. He reports that he does not use drugs. Family History:  family history includes Diabetes in his father and mother; Emphysema in his paternal grandfather; Heart Failure in his father and mother; Hypertension in his father and mother; Other in his father and mother; Prostate Cancer in his maternal uncle. Home Medications:  Prior to Admission medications    Medication Sig Start Date End Date Taking? Authorizing Provider   meclizine (ANTIVERT) 12.5 MG tablet Take 1 tablet by mouth 3 times daily as needed for Dizziness for 7 doses. 8/26/12 9/5/12 Yes Nelia Bonilla MD   ciprofloxacin (CIPRO) 500 MG tablet Take 1 tablet by mouth 2 times daily for 7 days. 8/26/12 9/2/12 Yes Nelia Bonilla MD   [facility]) 2 % nasal ointment Take by Nasal route 2 times daily. 8/26/12  Yes Nelia Bonilla MD   torsemide (DEMADEX) 100 MG tablet Take 1 tablet by mouth every morning. As needed for swelling 8/17/12  Yes Demetrius Villafuerte MD   Tadalafil (CIALIS) 2.5 MG TABS Take 2.5 mg by mouth daily. 8/13/12  Yes Demetrius Villafuerte MD   warfarin (COUMADIN) 6 MG tablet Take 12 mg by mouth daily. Yes Historical Provider, MD   glucose blood VI test strips (ASCENSIA AUTODISC VI;ONE TOUCH ULTRA TEST VI) strip Patient uses [facility] machine. Patient tests tid 5/29/12 5/24/13 Yes Demetrius Villafuerte MD   diltiazem (CARDIZEM CD) 360 MG ER capsule Take 1 capsule by mouth daily. 4/12/12  Yes Demetrius Villafuerte MD   insulin aspart (NOVOLOG) 100 UNIT/ML injection Inject 24 Units into the skin 3 times daily (before meals). Please make sure it is the Flexpens. 2/2/12  Yes Demetrius Villafuerte MD   metoprolol (LOPRESSOR) 25 MG tablet Take 1 tablet by mouth daily.  Faxed to [facility] 1/18/12  Yes Demetrius Villafuerte MD   lisinopril (PRINIVIL;ZESTRIL) 10 MG tablet Take 1 tablet by mouth daily. Faxed to [facility]. 1/18/12  Yes Sonu Rondon MD   levothyroxine (LEVOTHROID) 50 MCG tablet Take 1 tablet by mouth daily. Faxed [facility] 1/18/12  Yes Sonu Rondon MD   insulin detemir (LEVEMIR FLEXPEN) 100 UNIT/ML injection Inject 60 Units into the skin 2 times daily. 10/17/11  Yes Sonu Rondon MD   esomeprazole (NEXIUM) 40 MG capsule Take 1 capsule by mouth daily. 10/17/11 10/16/12 Yes Sonu Rondon MD   fluticasone-salmeterol (ADVAIR HFA) 13-10 MCG/ACT inhaler Inhale 2 puffs into the lungs 2 times daily. 6/29/11  Yes Sonu Rondon MD   Insulin Pen Needle (NOVOFINE) 30G X 8 MM MISC  5/26/11  Yes Sonu Rondon MD   warfarin (COUMADIN) 2 MG tablet Take 1 tablet by mouth daily. 3/19/12 3/19/13  Sonu Rondon MD   testosterone (ANDROGEL; TESTIM) 50 MG/5GM GEL gel Apply 5 g topically daily. [facility]   Sonu Rondon MD   tiotropium (SPIRIVA HANDIHALER) 18 MCG inhalation capsule Inhale 1 capsule into the lungs daily. 10/25/11   Sonu Rondon MD   warfarin (COUMADIN) 4 MG tablet Take 1 tablet by mouth daily. 7/14/11   Jalen Saxena MD        Allergies:  Lyrica [pregabalin]; Reglan [metoclopramide]; Simvastatin; and Amlodipine     Review of Systems:   · Constitutional: there has been no unanticipated weight loss. There's been no change in energy level, sleep pattern, or activity level. · Eyes: No visual changes or diplopia. No scleral icterus. · ENT: No Headaches, hearing loss or vertigo. No mouth sores or sore throat. · Cardiovascular: Reviewed in HPI  · Respiratory: No cough or wheezing, no sputum production. No hematemesis. · Gastrointestinal: No abdominal pain, appetite loss, blood in stools. No change in bowel or bladder habits. · Genitourinary: No dysuria, trouble voiding, or hematuria.   · Musculoskeletal:  No gait disturbance, weakness or joint HDL 25 (L) 06/19/2017    HDL 30 (L) 06/07/2017    HDL 32 (L) 02/13/2017     Lab Results   Component Value Date    LDLCALC 90 12/08/2016    LDLCALC 82 12/08/2015    LDLCALC 65 06/08/2015     Lab Results   Component Value Date    LABVLDL see below 12/08/2016    LABVLDL 48 12/08/2015    LABVLDL 49 06/08/2015     No results found for: CHOLHDLRATIO     Assessment:     1. Hypertension : Stable and well controlled and will continue present medical regimen. 2. GERD (gastroesophageal reflux disease) : managed per PCP. 3. DM (diabetes mellitus)  : managed per PCP     4. Atrial fibrillation:  Mainstay of treatment is HR control and anticoagulation therapy. Note [facility] manages his coumadin therapy now (used to be Dr. Karrie Haywood office). EKG 1/8/19 Atrial fibrillation Left axis deviation             I had d/w him regarding NOAC but he wants to think about it. I am willing to change to NOAC given no valvular disease on prior ECHO. Most recent EKG 5/1/19  Atrial fibrillationLeft axis deviationNonspecific ST abnormality        5. CAD (coronary artery disease):  Mild non-obstructive CAD and will continue medical management. Most recent [facility] 3/15/19 LM: distal 40% shelf like lesion LAD: ERNESTO-2 sluggish flow, luminals LCX: small, luminals RCA: dominant, luminals LVEDP:15 LVEF: 55% RIGHT HEART CATH signficant resp variation) RA: 11 RV: 30/10; PA:  30/16 and mean of 20 PCWP:  15 Sats: On RA Ao: 94% RA: 61% PA: 62% CO/CI 4.28/1.8.       6.  Hyperlipidemia: Most recent 6/19/17 I personally reviewed (see above results). He had intolerance to Zocor therapy back in Jan 2013 with muscle cramping and CK was noted to 476. He continues Pravastatin 20 mg daily. He has been tolerating without diffuse muscle cramping. His TG were over 500 and this is way too high but labs were non-fasting and PCP did not want to change regimen. Needs to watch diet more closely. Plan:  1.  I have encouraged better diet and making sure diabetes is controlled as best possible. 2. Given 7# weight gain and mildly elevated RA and wedge pressures I will add Lasix 40 mg 1 daily will check CMP, lipids in 7-10 days  3. Will stop Metoprolol tartrate once current bottle is completed and substitute Toprol  mg 1 daily which is long actiing form of medication and better proven for LV systolic dysfunction. 4. Will stop Diltiazem as it's a negative inotrope and start Aldactone 25 mg 1 daily which is better for reduced LVEF and can help with BP and diuresis. 5. Meds reviewed and no refills warranted---PCP fills   6. Follow up with CNP in 1 month and myself in 3 months    Cost of prescription medications and patient compliance have been reviewed with patient. All questions answered. Thank you for allowing me to participate in the care of this individual.     This note was scribed in the presence of Nir Mckinnon MD by Arjun Coleman RN    I, Dr. Balbir Pascual, personally performed the services described in this documentation, as scribed by the above signed scribe in my presence. It is both accurate and complete to my knowledge. I agree with the details independently gathered by the clinical support staff, while the remaining scribed note accurately describes my personal service to the patient. Henrry Branch.  Emily Merino M.D., [facility]

## 2019-06-03 ENCOUNTER — OFFICE VISIT (OUTPATIENT)
Dept: CARDIOLOGY CLINIC | Age: 69
End: 2019-06-03
Payer: COMMERCIAL

## 2019-06-03 VITALS
OXYGEN SATURATION: 97 % | DIASTOLIC BLOOD PRESSURE: 64 MMHG | BODY MASS INDEX: 36.58 KG/M2 | HEIGHT: 69 IN | SYSTOLIC BLOOD PRESSURE: 116 MMHG | WEIGHT: 247 LBS | HEART RATE: 63 BPM

## 2019-06-03 DIAGNOSIS — E78.2 MIXED HYPERLIPIDEMIA: Chronic | ICD-10-CM

## 2019-06-03 DIAGNOSIS — I10 ESSENTIAL HYPERTENSION: Chronic | ICD-10-CM

## 2019-06-03 DIAGNOSIS — I48.20 CHRONIC ATRIAL FIBRILLATION (HCC): Primary | Chronic | ICD-10-CM

## 2019-06-03 PROCEDURE — 4040F PNEUMOC VAC/ADMIN/RCVD: CPT | Performed by: INTERNAL MEDICINE

## 2019-06-03 PROCEDURE — 99214 OFFICE O/P EST MOD 30 MIN: CPT | Performed by: INTERNAL MEDICINE

## 2019-06-03 PROCEDURE — G8427 DOCREV CUR MEDS BY ELIG CLIN: HCPCS | Performed by: INTERNAL MEDICINE

## 2019-06-03 PROCEDURE — 1123F ACP DISCUSS/DSCN MKR DOCD: CPT | Performed by: INTERNAL MEDICINE

## 2019-06-03 PROCEDURE — 1036F TOBACCO NON-USER: CPT | Performed by: INTERNAL MEDICINE

## 2019-06-03 PROCEDURE — 3017F COLORECTAL CA SCREEN DOC REV: CPT | Performed by: INTERNAL MEDICINE

## 2019-06-03 PROCEDURE — G8417 CALC BMI ABV UP PARAM F/U: HCPCS | Performed by: INTERNAL MEDICINE

## 2019-06-03 RX ORDER — METOPROLOL SUCCINATE 100 MG/1
100 TABLET, EXTENDED RELEASE ORAL DAILY
Qty: 90 TABLET | Refills: 3 | Status: SHIPPED | OUTPATIENT
Start: 2019-06-03 | End: 2019-09-03 | Stop reason: DRUGHIGH

## 2019-06-03 RX ORDER — SPIRONOLACTONE 25 MG/1
25 TABLET ORAL DAILY
Qty: 30 TABLET | Refills: 5 | Status: SHIPPED | OUTPATIENT
Start: 2019-06-03 | End: 2019-06-06 | Stop reason: SDUPTHER

## 2019-06-03 RX ORDER — FUROSEMIDE 40 MG/1
40 TABLET ORAL DAILY
Qty: 30 TABLET | Refills: 5 | Status: SHIPPED | OUTPATIENT
Start: 2019-06-03 | End: 2019-06-06 | Stop reason: SDUPTHER

## 2019-06-03 NOTE — PATIENT INSTRUCTIONS
[address]   Cardiac Followup    Referring Provider:  Tree Cerda MD     Chief Complaint   Patient presents with    Follow Up After Procedure     s/p cath [address] 03/15/2019, groin site healed- w/o stents    Atrial Fibrillation    Coronary Artery Disease    Hyperlipidemia    Hypertension    Results     ekg 05/01/2019, echo 03/09/2019, nino 03/07/2019    Discuss Labs     in chart    Shoulder Pain    Shortness of Breath     for yrs    Dizziness     vertigo    Edema     once in awhile feet & hands    Fatigue      Subjective: Patient is being seen today for cardiology follow up for mild non-obstructive CAD,  HTN, and chronic afib;  C/O SOB      Past Medical History:    Mr. Rain Simon is a 72yo male with PMH mild non-obstructive CAD, GERD, HTN, and chronic afib on coumadin. Note LHC on 8/1/06 showed LVEF of 60% with 20% stenosis in the 1st diagonal; LI in the LAD, and very mild non-obstructive CAD. [id number] [employer] manages his PT/INR and adjusts his medications. He underwent  cardiac cath on 8/9/13 which showed mild non-obstructive disease (20% LM, 30% LAD, <20% CCx, 20% RCA, and LVEF=50%) managed medically. No need for PCI. Most recent SAJI BLE 1/4/2016 showed no evidence of stenosis in BLE. Normal SAJI's ankle-brachial  measured at 1.19 on the right and 1.25 on the left. Most recent Cardiac event monitor 6/20-7/3/17 AFIB showed an average HR of 85 ()  was brief. Carotid US 2/22/18 was negative for blockage    EKG 1/8/19 Atrial fibrillation Left axis deviation. Due to SOB complaints he underwent most recent Lexiscan myoview stress test 3/7/19 LVEF 54%  Apical hypokinesis  Fixed apical defect likely due to scar; No ischemia. Most recent ECHO 3/7/19 showed EF=35-40% with global HK (2012 and 2017 EF=55-60%).   Due to worsening LVEF and SOB he underwent most recent [facility] 3/15/19 LM: distal 40% shelf like lesion LAD: ERNESTO-2 sluggish flow, luminals LCX: small, luminals RCA: dominant, luminals LVEDP:15 LVEF: 55% RHC (signficant resp variation) RA: 11 RV: 30/10  PA:  30/16 and mean of 20 PCWP: 15 Sats: On RA Ao: 94% RA: 61% PA: 62% CO/CI 4.28/1.8. Most recent EKG 5/1/19  Atrial fibrillationLeft axis deviationNonspecific ST abnormality     History of Present Illness: Today, he reports feeling OK overall and he continues to report chronic SOB which may be related to his CLARISSE and asthma. He reports to wearing CPAP now through Dr. Namrata Chiang but sleeps in chair and does not use machine often. He c/o baseline fatigue. Continues to report  DANIELS with walking similar to prior. He denies CP, palpitations, dizziness, or syncope. 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 He reports his wife stopped his Amlodipine started by Dr. Melody Davis after cath as he was having swelling in his feet. Sluggish blood flow in LAD is reason amlodipine was started. Past Medical History:   has a past medical history of Arthritis, Asthma, Atrial fibrillation ([address] BPH (benign prostatic hypertrophy), CAD (coronary artery disease), Cervical disc disorder, unspecified, Chronic back pain, COPD, Diabetes mellitus ([address] DM (diabetes mellitus) with complications ([address] Elevated CPK, Erectile dysfunction, GERD (gastroesophageal reflux disease), Hypertension, Hypotestosteronism, Hypothyroidism, Neuropathy, CLARISSE (obstructive sleep apnea), Paroxysmal atrial fibrillation ([address] RBBB (right bundle branch block), and Vitamin D deficiency. Surgical History:   has a past surgical history that includes back surgery; Foot surgery; cardiovascular stress test; Cardiac catheterization (08/01/2006); Carpal tunnel release; Cataract removal with implant (3/9/2011); Cataract removal with implant; Diagnostic Cardiac Cath Lab Procedure (8/9/13); Colonoscopy (2014); Cholecystectomy (6/10/2015); Endoscopy, colon, diagnostic (06/28/2016); Cardiac catheterization (08/09/2013); and Cardiac catheterization (03/15/2019).      Social History:   reports (PRINIVIL;ZESTRIL) 10 MG tablet Take 1 tablet by mouth daily. Faxed to [facility] [employer]. 1/18/12  Yes Erica Trent MD   levothyroxine (LEVOTHROID) 50 MCG tablet Take 1 tablet by mouth daily. Faxed to[facility] [employer] 1/18/12  Yes Erica Trent MD   insulin detemir (LEVEMIR FLEXPEN) 100 UNIT/ML injection Inject 60 Units into the skin 2 times daily. 10/17/11  Yes Erica Trent MD   esomeprazole (NEXIUM) 40 MG capsule Take 1 capsule by mouth daily. 10/17/11 10/16/12 Yes Erica Trent MD   fluticasone-salmeterol (ADVAIR HFA) 19-75 MCG/ACT inhaler Inhale 2 puffs into the lungs 2 times daily. 6/29/11  Yes Erica Trent MD   Insulin Pen Needle (NOVOFINE) 30G X 8 MM MISC  5/26/11  Yes Erica Trent MD   warfarin (COUMADIN) 2 MG tablet Take 1 tablet by mouth daily. 3/19/12 3/19/13  Erica Trent MD   testosterone (ANDROGEL; TESTIM) 50 MG/5GM GEL gel Apply 5 g topically daily. [facility]   Erica Trent MD   tiotropium (SPIRIVA HANDIHALER) 18 MCG inhalation capsule Inhale 1 capsule into the lungs daily. 10/25/11   Erica Trent MD   warfarin (COUMADIN) 4 MG tablet Take 1 tablet by mouth daily. 7/14/11   Chris Hollis MD        Allergies:  Lyrica [pregabalin]; Reglan [metoclopramide]; Simvastatin; and Amlodipine     Review of Systems:   · Constitutional: there has been no unanticipated weight loss. There's been no change in energy level, sleep pattern, or activity level. · Eyes: No visual changes or diplopia. No scleral icterus. · ENT: No Headaches, hearing loss or vertigo. No mouth sores or sore throat. · Cardiovascular: Reviewed in HPI  · Respiratory: No cough or wheezing, no sputum production. No hematemesis. · Gastrointestinal: No abdominal pain, appetite loss, blood in stools. No change in bowel or bladder habits. · Genitourinary: No dysuria, trouble voiding, or hematuria.   · Musculoskeletal:  No gait disturbance, weakness or joint complaints. · Integumentary: No rash or pruritis. · Neurological: No headache, diplopia, change in muscle strength, numbness or tingling. No change in gait, balance, coordination, mood, affect, memory, mentation, behavior. · Psychiatric: No anxiety, no depression. · Endocrine: No malaise, fatigue or temperature intolerance. No excessive thirst, fluid intake, or urination. No tremor. · Hematologic/Lymphatic: No abnormal bruising or bleeding, blood clots or swollen lymph nodes. · Allergic/Immunologic: No nasal congestion or hives. Physical Examination:    Vitals:    06/03/19 1428   BP: 116/64   Pulse: 63   SpO2: 97%       Wt Readings from Last 3 Encounters:   06/03/19 247 lb (112 kg)   05/20/19 240 lb 1.3 oz (108.9 kg)   05/01/19 240 lb (108.9 kg)       Constitutional and General Appearance: NAD   Respiratory:  · Normal excursion and expansion without use of accessory muscles  · Resp Auscultation: Soft breath sounds without dullness  Cardiovascular:  · The apical impulses not displaced  · Heart tones are crisp and normal  · Cervical veins are not engorged  · The carotid upstroke is normal in amplitude and contour without delay or bruit  · Irregularly irregular, No S3, No Murmur  · Peripheral pulses are symmetrical and full  · There is no clubbing, cyanosis of the extremities.   · 1+ BLE  edema  · Femoral Arteries: 2+ and equal  · Pedal Pulses: 2+ and equal   Abdomen:  · No masses or tenderness  · Liver/Spleen: No Abnormalities Noted  Neurological/Psychiatric:  · Alert and oriented in all spheres  · Moves all extremities well  · Exhibits normal gait balance and coordination  · No abnormalities of mood, affect, memory, mentation, or behavior are noted    Lab Results   Component Value Date    CHOL 145 06/19/2017    CHOL 145 06/07/2017    CHOL 140 02/13/2017     Lab Results   Component Value Date    TRIG 528 (H) 06/19/2017    TRIG 225 (H) 06/07/2017    TRIG 182 (H) 02/13/2017     Lab Results   Component Value Date HDL 25 (L) 06/19/2017    HDL 30 (L) 06/07/2017    HDL 32 (L) 02/13/2017     Lab Results   Component Value Date    LDLCALC 90 12/08/2016    LDLCALC 82 12/08/2015    LDLCALC 65 06/08/2015     Lab Results   Component Value Date    LABVLDL see below 12/08/2016    LABVLDL 48 12/08/2015    LABVLDL 49 06/08/2015     No results found for: CHOLHDLRATIO     Assessment:     1. Hypertension : Stable and well controlled and will continue present medical regimen. 2. GERD (gastroesophageal reflux disease) : managed per PCP. 3. DM (diabetes mellitus)  : managed per PCP     4. Atrial fibrillation:  Mainstay of treatment is HR control and anticoagulation therapy. [facility] manages his coumadin therapy now (used to be Dr. Martínez Se office). EKG 1/8/19 Atrial fibrillation Left axis deviation             I had d/w him regarding NOAC but he wants to think about it. I am willing to change to NOAC given no valvular disease on prior ECHO. Most recent EKG 5/1/19  Atrial fibrillationLeft axis deviationNonspecific ST abnormality        5. CAD (coronary artery disease):  Mild non-obstructive CAD and will continue medical management. Most recent [facility] 3/15/19 LM: distal 40% shelf like lesion LAD: ERNESTO-2 sluggish flow, luminals LCX: small, luminals RCA: dominant, luminals LVEDP:15 LVEF: 55% RIGHT HEART CATH signficant resp variation) RA: 11 RV: 30/10; PA:  30/16 and mean of 20 PCWP:  15 Sats: On RA Ao: 94% RA: 61% PA: 62% CO/CI 4.28/1.8.       6.  Hyperlipidemia: Most recent 6/19/17 I personally reviewed (see above results). He had intolerance to Zocor therapy back in Jan 2013 with muscle cramping and CK was noted to 476. He continues Pravastatin 20 mg daily. He has been tolerating without diffuse muscle cramping. His TG were over 500 and this is way too high but labs were non-fasting and PCP did not want to change regimen. Needs to watch diet more closely. Plan:  1.  I have encouraged better diet and making sure diabetes is controlled as best possible. 2. Given 7# weight gain and mildly elevated RA and wedge pressures I will add Lasix 40 mg 1 daily will check CMP, lipids in 7-10 days  3. Will stop Metoprolol tartrate once current bottle is completed and substitute Toprol  mg 1 daily which is long actiing form of medication and better proven for LV systolic dysfunction. 4. Will stop Diltiazem as it's a negative inotrope and start Aldactone 25 mg 1 daily which is better for reduced LVEF and can help with BP and diuresis. 5. Meds reviewed and no refills warranted---PCP fills   6. Follow up with CNP in 1 month and myself in 3 months    Cost of prescription medications and patient compliance have been reviewed with patient. All questions answered. Thank you for allowing me to participate in the care of this individual.     This note was scribed in the presence of April Justice MD by Viet Quan RN    I, Dr. Cesar Mccartney, personally performed the services described in this documentation, as scribed by the above signed scribe in my presence. It is both accurate and complete to my knowledge. I agree with the details independently gathered by the clinical support staff, while the remaining scribed note accurately describes my personal service to the patient. Brit Reyes.  Odin Murcia M.D., [address] [facility]

## 2019-06-04 RX ORDER — WARFARIN SODIUM 5 MG/1
TABLET ORAL
Qty: 180 TABLET | Refills: 2 | Status: SHIPPED | OUTPATIENT
Start: 2019-06-04 | End: 2020-01-20 | Stop reason: SDUPTHER

## 2019-06-05 ENCOUNTER — ANTI-COAG VISIT (OUTPATIENT)
Dept: PHARMACY | Age: 69
End: 2019-06-05
Payer: COMMERCIAL

## 2019-06-05 DIAGNOSIS — I48.20 CHRONIC ATRIAL FIBRILLATION (HCC): ICD-10-CM

## 2019-06-05 LAB — INR BLD: 1.8

## 2019-06-05 PROCEDURE — 99211 OFF/OP EST MAY X REQ PHY/QHP: CPT | Performed by: PHARMACIST

## 2019-06-05 PROCEDURE — 85610 PROTHROMBIN TIME: CPT | Performed by: PHARMACIST

## 2019-06-05 NOTE — PROGRESS NOTES
is here for management of anticoagulation for AFib. PMH also significant for DM, Hyopthyroid, GERD, HTN, CAD, COPD. He presents today w/out complaint. Pt verifies dosing regimen as listed above. Pt denies s/s bleeding/bruising/swelling/SOB. No BRBPR. No melena. No missed doses. Reviewed pt medication list.  No changes in OTC/herbal medications. Reviewed dietary concerns. Pt states that he does not pay attention to diet, including for his diabetes. Patient denies any changes. INR 1.8 is below the acceptable therapeutic range of 2-3. Recommend to increase dose to 5 mg Monday/Friday and 7.5 mg every other day. Patient has 5 mg and 2 mg tablets. Will continue to monitor and check INR in 2 weeks (usually 6 weeks per pt request.)  Dosing reminder card given with phone number, appointment date and time. Return to clinic: 6/19 @ 10:15 am     Jeovanny Christensen, Pharmacy Intern 9:30 6/5/19    I have seen the patient and reviewed the progress note written by the PharmD Candidate. I agree with this assessment and plan.    Andrea Tan PharmD 6/5/2019 9:34 AM

## 2019-06-06 RX ORDER — SPIRONOLACTONE 25 MG/1
25 TABLET ORAL DAILY
Qty: 90 TABLET | Refills: 5 | Status: ON HOLD | OUTPATIENT
Start: 2019-06-06 | End: 2019-08-01

## 2019-06-06 RX ORDER — FUROSEMIDE 40 MG/1
40 TABLET ORAL DAILY
Qty: 90 TABLET | Refills: 5 | Status: SHIPPED | OUTPATIENT
Start: 2019-06-06 | End: 2020-06-24

## 2019-06-10 ENCOUNTER — TELEPHONE (OUTPATIENT)
Dept: CARDIOLOGY CLINIC | Age: 69
End: 2019-06-10

## 2019-06-10 NOTE — TELEPHONE ENCOUNTER
Left message on VM that he forgot his slip for labs to be drawn this week called and reminded home to have drawn this week fasting at [facility] facility orders in epic Bri Aldrich RN

## 2019-06-11 ENCOUNTER — TELEPHONE (OUTPATIENT)
Dept: CARDIOLOGY CLINIC | Age: 69
End: 2019-06-11

## 2019-06-11 ENCOUNTER — HOSPITAL ENCOUNTER (OUTPATIENT)
Age: 69
Discharge: HOME | End: 2019-06-11
Payer: COMMERCIAL

## 2019-06-11 DIAGNOSIS — I48.20 CHRONIC ATRIAL FIBRILLATION (HCC): Chronic | ICD-10-CM

## 2019-06-11 DIAGNOSIS — E78.2 MIXED HYPERLIPIDEMIA: Chronic | ICD-10-CM

## 2019-06-11 DIAGNOSIS — I10 ESSENTIAL HYPERTENSION: Chronic | ICD-10-CM

## 2019-06-11 LAB
A/G RATIO: 1.6 (ref 1.1–2.2)
ALBUMIN SERPL-MCNC: 3.9 G/DL (ref 3.4–5)
ALP BLD-CCNC: 63 U/L (ref 40–129)
ALT SERPL-CCNC: 17 U/L (ref 10–40)
ANION GAP SERPL CALCULATED.3IONS-SCNC: 12 MMOL/L (ref 3–16)
AST SERPL-CCNC: 20 U/L (ref 15–37)
BILIRUB SERPL-MCNC: 0.4 MG/DL (ref 0–1)
BUN BLDV-MCNC: 16 MG/DL (ref 7–20)
CALCIUM SERPL-MCNC: 9.2 MG/DL (ref 8.3–10.6)
CHLORIDE BLD-SCNC: 108 MMOL/L (ref 99–110)
CHOLESTEROL, FASTING: 116 MG/DL (ref 0–199)
CO2: 23 MMOL/L (ref 21–32)
CREAT SERPL-MCNC: 1 MG/DL (ref 0.8–1.3)
GFR AFRICAN AMERICAN: >60
GFR NON-AFRICAN AMERICAN: >60
GLOBULIN: 2.5 G/DL
GLUCOSE FASTING: 136 MG/DL (ref 70–99)
HDLC SERPL-MCNC: 23 MG/DL (ref 40–60)
LDL CHOLESTEROL CALCULATED: ABNORMAL MG/DL
LDL CHOLESTEROL DIRECT: 52 MG/DL
POTASSIUM SERPL-SCNC: 4.1 MMOL/L (ref 3.5–5.1)
SODIUM BLD-SCNC: 143 MMOL/L (ref 136–145)
TOTAL PROTEIN: 6.4 G/DL (ref 6.4–8.2)
TRIGLYCERIDE, FASTING: 315 MG/DL (ref 0–150)
VLDLC SERPL CALC-MCNC: ABNORMAL MG/DL

## 2019-06-11 PROCEDURE — 36415 COLL VENOUS BLD VENIPUNCTURE: CPT

## 2019-06-11 PROCEDURE — 80053 COMPREHEN METABOLIC PANEL: CPT

## 2019-06-11 PROCEDURE — 80061 LIPID PANEL: CPT

## 2019-06-12 ENCOUNTER — OFFICE VISIT (OUTPATIENT)
Dept: ORTHOPEDIC SURGERY | Age: 69
End: 2019-06-12
Payer: COMMERCIAL

## 2019-06-12 ENCOUNTER — TELEPHONE (OUTPATIENT)
Dept: CARDIOLOGY CLINIC | Age: 69
End: 2019-06-12

## 2019-06-12 VITALS — HEIGHT: 69 IN | BODY MASS INDEX: 36.57 KG/M2 | WEIGHT: 246.91 LBS

## 2019-06-12 DIAGNOSIS — E78.49 OTHER HYPERLIPIDEMIA: Primary | ICD-10-CM

## 2019-06-12 DIAGNOSIS — E78.2 MIXED HYPERLIPIDEMIA: ICD-10-CM

## 2019-06-12 DIAGNOSIS — S93.491D SPRAIN OF ANTERIOR TALOFIBULAR LIGAMENT OF RIGHT ANKLE, SUBSEQUENT ENCOUNTER: Primary | ICD-10-CM

## 2019-06-12 DIAGNOSIS — E78.1 HIGH TRIGLYCERIDES: ICD-10-CM

## 2019-06-12 DIAGNOSIS — M25.571 ACUTE RIGHT ANKLE PAIN: ICD-10-CM

## 2019-06-12 PROCEDURE — G8427 DOCREV CUR MEDS BY ELIG CLIN: HCPCS | Performed by: FAMILY MEDICINE

## 2019-06-12 PROCEDURE — G8417 CALC BMI ABV UP PARAM F/U: HCPCS | Performed by: FAMILY MEDICINE

## 2019-06-12 PROCEDURE — 99213 OFFICE O/P EST LOW 20 MIN: CPT | Performed by: FAMILY MEDICINE

## 2019-06-12 PROCEDURE — 4040F PNEUMOC VAC/ADMIN/RCVD: CPT | Performed by: FAMILY MEDICINE

## 2019-06-12 PROCEDURE — 1123F ACP DISCUSS/DSCN MKR DOCD: CPT | Performed by: FAMILY MEDICINE

## 2019-06-12 PROCEDURE — 3017F COLORECTAL CA SCREEN DOC REV: CPT | Performed by: FAMILY MEDICINE

## 2019-06-12 PROCEDURE — 1036F TOBACCO NON-USER: CPT | Performed by: FAMILY MEDICINE

## 2019-06-12 NOTE — PROGRESS NOTES
Chief Complaint     Ankle Pain (CK RIGHT ANKLE DOI 5/1/2019)    Follow-up right ankle pain status post ankle inversion 5/1/2019 with grade 1-2 ankle sprain     History of Present Illness:  Zurdo Dugan is a 81 y.o. 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 Naseem Jalloh who sees Dr. Gordon Marshall for his diabetes who is being seen today upon referral from [facility] for evaluation of an injury to his right ankle. He states that on 5/1/2019 he was attempting to reach through a gate abd reaching through the gate to pull a watering trough towards him when it was pulled in the opposite direction until he fell forward and his head struck an electrified fence causing him to fall backwards and likely sustained a rotational inversion injury about his right ankle. He is uncertain as to the exact mechanism of injury as he believes he may have blacked out very briefly from striking the electrified fence. Immediately though he did have pain laterally involving the ankle with limited ability to bear weight. He was actually seen at [facility] where x-rays were negative for fracture and he was placed in an ankle brace and encouraged to take Tylenol. Overall his symptoms have improved about 50% and he is having more soreness and stiffness and actual pain. Walking on uneven surfaces can produce pain in the range of 5-6 out of 10 however. His endocrinologist actually discouraged him from using the boot. He has not yet had therapy. No sensations of loose bodies locking or catching. His diabetes is under reasonable control per the patient and he believes his last A1c was 6.2. His swelling has improved. He believes he may have sprained his ankle in the past.  Denies sensation of loose bodies locking or catching.   Previous to his injury on 5/1/2019, he was in vestibular imbalance retraining however he has not been able to dissipate in this with subacute ankle injury and his therapist recommended that he put this on hold until his ankle is better. He is being seen today for orthopedic and sports consultation with imaging. He was seen initially in the office on 5/20/2019 diagnosed with a grade 1-2 ankle sprain started in rehabilitation and placed in bracing and Voltaren gel. He presents back today stating that he is doing much better. He rates his improvement at 85+ percent. He only attended one session of physical therapy at [facility] ([facility]) and believes he can do all of these exercises at home. 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 He still has some residual weakness but is much more functional.  He is using his Voltaren gel only twice daily. Denies locking catching or instability symptoms. He is pleased with his progress. Medical History    Patient's medications, allergies, past medical, surgical, social and family histories were reviewed and updated as appropriate. Review of Systems    Pertinent items are noted in HPI  Review of systems reviewed from Patient History Form dated on 5/20/2019 and available in the patient's chart under the Media tab. Vital Signs  There were no vitals filed for this visit. General Exam:     Constitutional: Patient is adequately groomed with no evidence of malnutrition  DTRs: Deep tendon reflexes are intact  Mental Status: The patient is oriented to time, place and person. The patient's mood and affect are appropriate. Lymphatic: The lymphatic examination bilaterally reveals all areas to be without enlargement or induration. Vascular: Examination reveals no swelling or calf tenderness. Peripheral pulses are palpable and 2+. Neurological: The patient has good coordination. There is no weakness or sensory deficit. Ankle Examination  Inspection:  There is no high-grade deformity low does have residual 1+ swelling with some resolving lateral ecchymosis.       Palpation:  He is less tender at this time over the ATFL and about 3 out of 10 and CF 1 out of 10  ligamentous complex. There does not appear to be high-grade effusion or substantial osseous tenderness at this point and no medial tenderness noted. Rang of Motion:  He does have about a 20 % reduction and ankle motion. Strength: Does have some ongoing weakness once again noted with strength being about 4-5 with eversion and dorsiflexion. Special Tests: Plus nonpainful anterior drawer. Negative patellar tilt Mortensen's testing. Skin: There are no rashes, ulcerations or lesions. Distal motor sensory and vascular exam is intact. Gait: Improved gait    Reflex symmetrically preserved. Additional Comments:       Additional Examinations:       Contralateral Exam: Examination of the left ankle reveals intact skin. There is no focal tenderness or swelling. The patient demonstrates full painless range of motion with regards to plantarflexion, dorsiflexion, inversion, eversion. Strength is 5/5 thorough out all planes. Ligamentous stability is grossly intact. Right Lower Extremity: Examination of the right lower extremity does not show any tenderness, deformity or injury. Range of motion is unremarkable. There is no gross instability. There are no rashes, ulcerations or lesions. Strength and tone are normal.  Left Lower Extremity: Examination of the left lower extremity does not show any tenderness, deformity or injury. Range of motion is unremarkable. There is no gross instability. There are no rashes, ulcerations or lesions. Strength and tone are normal.        Diagnostic Test Findings:           Assessment :  #1. Nearly 6 weeks status post at least grade 2 right lateral ankle sprain with ankle pain         Impression:    Encounter Diagnoses   Name Primary?     Sprain of anterior talofibular ligament of right ankle, subsequent encounter Yes    Acute right ankle pain        Office Procedures:    No orders of the defined types were placed in this encounter. Treatment Plan:  Treatment options were discussed with Corey Warner. We did again review his plain films and exam findings. He is now almost 6 weeks out from a significant at least grade 2 right lateral ankle sprain. Overall he would rate his improvement currently at about 85 %. Clinically at this time he is doing better and I strongly suggested that he get back into physical therapy formally but he does not wish to consider this and declined and would prefer to perform his home-based exercises. I would recommend utilizing his lace up brace for at risk activities. He will continue with his Voltaren gel increasing to 4 g 4 times daily at least for the next several weeks. We will see him back in 3 to 4 weeks for follow-up. He will contact us in the interim with questions or concerns and icing and activity modification was discussed. This dictation was performed with a verbal recognition program (DRAGON) and it was checked for errors. It is possible that there are still dictated errors within this office note. If so, please bring any errors to my attention for an addendum. All efforts were made to ensure that this office note is accurate.

## 2019-06-12 NOTE — TELEPHONE ENCOUNTER
----- Message from Allegra Claros MD sent at 6/12/2019  8:30 AM EDT -----  Was he fasting? If so, TC and LDL are good but chronically low HDL and elevated TG. Is he willing to take tricor (fenofibrate) which is med directly targeting TG? Is so, let's start 54mg po daily and watch diet closely. F/U fasting lipids and LFT's in 2 months.

## 2019-06-17 DIAGNOSIS — E78.2 MIXED HYPERLIPIDEMIA: Primary | Chronic | ICD-10-CM

## 2019-06-17 RX ORDER — FENOFIBRATE 54 MG/1
54 TABLET ORAL DAILY
Qty: 90 TABLET | Refills: 3 | Status: SHIPPED | OUTPATIENT
Start: 2019-06-17 | End: 2019-07-03

## 2019-06-18 RX ORDER — FENOFIBRATE 54 MG/1
54 TABLET ORAL DAILY
Qty: 90 TABLET | Refills: 0 | Status: SHIPPED | OUTPATIENT
Start: 2019-06-18 | End: 2020-03-05 | Stop reason: SDUPTHER

## 2019-06-19 ENCOUNTER — ANTI-COAG VISIT (OUTPATIENT)
Dept: PHARMACY | Age: 69
End: 2019-06-19
Payer: COMMERCIAL

## 2019-06-19 DIAGNOSIS — I48.20 CHRONIC ATRIAL FIBRILLATION (HCC): ICD-10-CM

## 2019-06-19 LAB — INR BLD: 1.3

## 2019-06-19 PROCEDURE — 85610 PROTHROMBIN TIME: CPT | Performed by: PHARMACIST

## 2019-06-19 PROCEDURE — 99211 OFF/OP EST MAY X REQ PHY/QHP: CPT | Performed by: PHARMACIST

## 2019-07-02 ENCOUNTER — TELEPHONE (OUTPATIENT)
Dept: CARDIOLOGY CLINIC | Age: 69
End: 2019-07-02

## 2019-07-02 NOTE — TELEPHONE ENCOUNTER
CARDIAC CLEARANCE REQUEST    What type of procedure are you having:steriod injection    Are you taking any blood thinners:yes, ok to hold coumadin 5 days prior?     When is your procedure scheduled for:no date set    What physician is performing your procedure:    Phone Number:[phone]    Fax number to send the letter:fax letter to [phone]

## 2019-07-02 NOTE — PROGRESS NOTES
[facility]   Cardiology Note              Date:  July 2, 2019  Patientname: Carl Gross  YOB: 1950\  Chief Complaint   Patient presents with    1 Month Follow-Up    Atrial Fibrillation    Coronary Artery Disease    Hyperlipidemia    Hypertension    Discuss Labs     CBC 05/01/2019, Lip & CMP 06/11/2019    Medication Adjustment    Shortness of Breath     asthma    Fatigue        Primary Care physician: Magdalene Oswald MD    HISTORY OF PRESENT ILLNESS: Carl Gross is a 81 y.o. male with a PMH of mild non-obstructive CAD, GERD, HTN, and chronic afib on coumadin. He was recently seen by Dr. Zoe Bunch and placed on 40 mg of lasix for increased BLE swelling, also aldactone was added, Cardizem was discontinued, and once he is finished with his metoprolol he has at home he ws to start Toprol XL. Today he presents for follow up for CAD, HTN, chronic Afib, shortness of breath, and to follow up regarding medicine changes. Overall he stated he feels well. His shortness of breath remains the same, usually only with exertion. 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 He has not been weighing himself or monitoring blood pressure at home. He did notice himself urinating more since starting the Lasix and aldactone. Denies any muscle cramps. He stated his wife may not be putting the Lasix in his pill box everyday, she is concerned it is going to make his kidney function worse. I reviewed lab work with patient after starting Lasix  He does makes sure he takes it when he feels himself swelling more that usual. He denies any swelling to BLE today,   He is unsure if he has started taking the Toprol XL yet. He has been having a lot of back issues and is going to have injections completed. Ok per Dr. Zoe Bunch. He has been wearing CPAP at night when he is in bed, but do to back pain he has been sleeping in his chair and CPAP isn't bedside chair.      Carl Gross describes

## 2019-07-03 ENCOUNTER — OFFICE VISIT (OUTPATIENT)
Dept: CARDIOLOGY CLINIC | Age: 69
End: 2019-07-03
Payer: COMMERCIAL

## 2019-07-03 VITALS
SYSTOLIC BLOOD PRESSURE: 128 MMHG | HEIGHT: 69 IN | HEART RATE: 71 BPM | DIASTOLIC BLOOD PRESSURE: 74 MMHG | OXYGEN SATURATION: 99 % | WEIGHT: 245 LBS | BODY MASS INDEX: 36.29 KG/M2

## 2019-07-03 DIAGNOSIS — I10 ESSENTIAL HYPERTENSION: Chronic | ICD-10-CM

## 2019-07-03 DIAGNOSIS — Z98.890 S/P CARDIAC CATH: Chronic | ICD-10-CM

## 2019-07-03 DIAGNOSIS — I48.20 CHRONIC ATRIAL FIBRILLATION (HCC): Primary | Chronic | ICD-10-CM

## 2019-07-03 DIAGNOSIS — Z99.89 OSA ON CPAP: Chronic | ICD-10-CM

## 2019-07-03 DIAGNOSIS — G47.33 OSA ON CPAP: Chronic | ICD-10-CM

## 2019-07-03 PROCEDURE — 99213 OFFICE O/P EST LOW 20 MIN: CPT | Performed by: NURSE PRACTITIONER

## 2019-07-03 ASSESSMENT — ENCOUNTER SYMPTOMS
CHEST TIGHTNESS: 0
SHORTNESS OF BREATH: 1

## 2019-07-03 NOTE — PATIENT INSTRUCTIONS
symptoms including dyspnea, fatigue but denies chest pain, palpitations, orthopnea, PND, exertional chest pressure/discomfort, early saiety, edema, syncope. Past Medical History:   has a past medical history of Arthritis, Asthma, Atrial fibrillation ([facility] [address] BPH (benign prostatic hypertrophy), CAD (coronary artery disease), Cervical disc disorder, unspecified, Chronic back pain, COPD, Diabetes mellitus ([facility] [address] DM (diabetes mellitus) with complications ([facility] [address] Elevated CPK, Erectile dysfunction, GERD (gastroesophageal reflux disease), Hypertension, Hypotestosteronism, Hypothyroidism, Neuropathy, CLARISSE (obstructive sleep apnea), Paroxysmal atrial fibrillation ([facility] [address] RBBB (right bundle branch block), and Vitamin D deficiency. Past Surgical History:   has a past surgical history that includes back surgery; Foot surgery; cardiovascular stress test; Cardiac catheterization (08/01/2006); Carpal tunnel release; Cataract removal with implant (3/9/2011); Cataract removal with implant; Diagnostic Cardiac Cath Lab Procedure (8/9/13); Colonoscopy (2014); Cholecystectomy (6/10/2015); Endoscopy, colon, diagnostic (06/28/2016); Cardiac catheterization (08/09/2013); and Cardiac catheterization (03/15/2019). Home Medications:    Prior to Admission medications    Medication Sig Start Date End Date Taking?  Authorizing Provider   fenofibrate (TRICOR) 54 MG tablet Take 1 tablet by mouth daily 6/18/19   Amanda Silva MD   fenofibrate (TRICOR) 54 MG tablet Take 1 tablet by mouth daily 6/17/19   Amanda Silva MD   furosemide (LASIX) 40 MG tablet Take 1 tablet by mouth daily 6/6/19   Amanda Silva MD   spironolactone (ALDACTONE) 25 MG tablet Take 1 tablet by mouth daily 6/6/19   Amanda Silva MD   warfarin (COUMADIN) 5 MG tablet TAKE 1 TO 2 TABLETS BY  MOUTH DAILY AS DIRECTED 6/4/19   Clair Hartman MD   metoprolol succinate (TOPROL XL) 100 MG extended release tablet Take 1 tablet by mouth daily 6/3/19   Lluvia Arias

## 2019-07-09 LAB
AVERAGE GLUCOSE: NORMAL
HBA1C MFR BLD: 6.7 %

## 2019-07-10 ENCOUNTER — ANTI-COAG VISIT (OUTPATIENT)
Dept: PHARMACY | Age: 69
End: 2019-07-10
Payer: COMMERCIAL

## 2019-07-10 DIAGNOSIS — I48.20 CHRONIC ATRIAL FIBRILLATION (HCC): ICD-10-CM

## 2019-07-10 LAB — INR BLD: 1.4

## 2019-07-10 PROCEDURE — 85610 PROTHROMBIN TIME: CPT | Performed by: PHARMACIST

## 2019-07-10 PROCEDURE — 99211 OFF/OP EST MAY X REQ PHY/QHP: CPT | Performed by: PHARMACIST

## 2019-07-17 RX ORDER — LEVOTHYROXINE SODIUM 0.07 MG/1
TABLET ORAL
Qty: 90 TABLET | Refills: 1 | Status: SHIPPED | OUTPATIENT
Start: 2019-07-17 | End: 2019-12-04 | Stop reason: SDUPTHER

## 2019-07-22 ENCOUNTER — OFFICE VISIT (OUTPATIENT)
Dept: FAMILY MEDICINE CLINIC | Age: 69
End: 2019-07-22
Payer: COMMERCIAL

## 2019-07-22 VITALS
BODY MASS INDEX: 36.18 KG/M2 | OXYGEN SATURATION: 97 % | HEART RATE: 91 BPM | TEMPERATURE: 97.6 F | DIASTOLIC BLOOD PRESSURE: 72 MMHG | WEIGHT: 245 LBS | SYSTOLIC BLOOD PRESSURE: 112 MMHG

## 2019-07-22 DIAGNOSIS — E11.42 DM TYPE 2 WITH DIABETIC PERIPHERAL NEUROPATHY (HCC): Chronic | ICD-10-CM

## 2019-07-22 DIAGNOSIS — I10 ESSENTIAL HYPERTENSION: Chronic | ICD-10-CM

## 2019-07-22 DIAGNOSIS — E03.9 ACQUIRED HYPOTHYROIDISM: Primary | Chronic | ICD-10-CM

## 2019-07-22 DIAGNOSIS — I48.20 CHRONIC ATRIAL FIBRILLATION (HCC): Chronic | ICD-10-CM

## 2019-07-22 PROCEDURE — 1123F ACP DISCUSS/DSCN MKR DOCD: CPT | Performed by: FAMILY MEDICINE

## 2019-07-22 PROCEDURE — 1036F TOBACCO NON-USER: CPT | Performed by: FAMILY MEDICINE

## 2019-07-22 PROCEDURE — 3017F COLORECTAL CA SCREEN DOC REV: CPT | Performed by: FAMILY MEDICINE

## 2019-07-22 PROCEDURE — 99214 OFFICE O/P EST MOD 30 MIN: CPT | Performed by: FAMILY MEDICINE

## 2019-07-22 PROCEDURE — G8427 DOCREV CUR MEDS BY ELIG CLIN: HCPCS | Performed by: FAMILY MEDICINE

## 2019-07-22 PROCEDURE — 4040F PNEUMOC VAC/ADMIN/RCVD: CPT | Performed by: FAMILY MEDICINE

## 2019-07-22 PROCEDURE — G8417 CALC BMI ABV UP PARAM F/U: HCPCS | Performed by: FAMILY MEDICINE

## 2019-07-22 PROCEDURE — 2022F DILAT RTA XM EVC RTNOPTHY: CPT | Performed by: FAMILY MEDICINE

## 2019-07-22 PROCEDURE — 3046F HEMOGLOBIN A1C LEVEL >9.0%: CPT | Performed by: FAMILY MEDICINE

## 2019-07-22 RX ORDER — NYSTATIN 100000 [USP'U]/G
POWDER TOPICAL
Qty: 1 BOTTLE | Refills: 5 | Status: SHIPPED | OUTPATIENT
Start: 2019-07-22 | End: 2020-12-10

## 2019-07-22 NOTE — PROGRESS NOTES
Subjective:   He presents for follow up of his thyroid disease atrial fibrillation on Coumadin and high blood pressure diabetes and other chronic health problems. He continues to have pain in his back he had an MRI done at [facility] they are talking about putting in a spinal stimulator. He saw his lung specialist last week. His blood pressures have been under good control. He is still going to endocrinology for diabetes control. Is on anticoagulation with Coumadin for his atrial fibrillation. Last HGBa1c was 6.1% 2 weeks ago. He is allergic to lyrica [pregabalin]; reglan [metoclopramide]; simvastatin; and amlodipine. He   reports that he quit smoking about 29 years ago. His smoking use included cigarettes. He has a 20.00 pack-year smoking history. He has never used smokeless tobacco. Review of systems negative for chest pain swelling shortness of breath wheezing abdominal pain rectal bleeding  Objective:   /72   Pulse 91   Temp 97.6 °F (36.4 °C) (Oral)   Wt 245 lb (111.1 kg)   SpO2 97%   BMI 36.18 kg/m²   BP Readings from Last 3 Encounters:   07/22/19 112/72   07/03/19 128/74   06/03/19 116/64     Physical Exam   Constitutional: He is oriented to person, place, and time. He appears well-developed and well-nourished. Non-toxic appearance. HENT:   Head: Normocephalic. Eyes: Conjunctivae are normal. Right eye exhibits no discharge. Left eye exhibits no discharge. No scleral icterus. Neck: Neck supple. Carotid bruit is not present. No thyroid mass and no thyromegaly present. Cardiovascular: Normal rate, regular rhythm and normal heart sounds. No murmur heard. Pulmonary/Chest: Breath sounds normal. No respiratory distress. He has no wheezes. He has no rales. Abdominal: Soft. He exhibits no distension and no mass. There is no hepatosplenomegaly. There is no tenderness. There is no rebound and no guarding. Musculoskeletal: He exhibits no edema.    Lymphadenopathy:     He has no cervical

## 2019-07-24 ENCOUNTER — ANTI-COAG VISIT (OUTPATIENT)
Dept: PHARMACY | Age: 69
End: 2019-07-24
Payer: COMMERCIAL

## 2019-07-24 DIAGNOSIS — I48.20 CHRONIC ATRIAL FIBRILLATION (HCC): ICD-10-CM

## 2019-07-24 LAB — INR BLD: 1.7

## 2019-07-24 PROCEDURE — 99211 OFF/OP EST MAY X REQ PHY/QHP: CPT

## 2019-07-24 PROCEDURE — 85610 PROTHROMBIN TIME: CPT

## 2019-07-31 ENCOUNTER — TELEPHONE (OUTPATIENT)
Dept: PULMONOLOGY | Age: 69
End: 2019-07-31

## 2019-07-31 NOTE — TELEPHONE ENCOUNTER
Patient cancelled appointment on 8/1/19 with Dr Mickey Ness for 6 mo fu asthma, radha. Reason: has another appt    Patient did reschedule appointment. Appointment rescheduled for 11/14/19. ASSESSMENT AND PLAN: 2/20/19           Asthma moderate persistent with AE  -Prednisone taper and doxy x 10 days  -   Continue prednisone 10 mg after taper for the rest of the month  Per hx. Patient states that change in weather is a trigger. PFTs did not show obstruction  - continue Advair, lower dose, continue albuterol prn  - control nasal symptoms with flonase       Obesity  - advised weight loss with diet and exercise     Cough    I favor either PND or cough variant asthma as etiology. Stable  - continue nasal ICS      Dyspnea    Based on the information available thus far, I favor a diagnosis of asthma or CAD. -Worse        RADHA (Obstructive Sleep Apnea)   - continue CPAP 9 cwp 6-8 hrs at night. - Weight loss and exercise. - Sleep hygiene  - Avoid sedatives, alcohol and caffeinated drinks at bed time. - Avoid driving while sleepy.   - Weight loss is also recommended as a long-term intervention.    - Complications of RADHA if not treated were discussed with patient patient, including: systemic hypertension, pulmonary hypertension, cardiovascular morbidities, car accidents and all cause mortality.           Inactive problems     Pulmonary nodules  - 4mm in LLL  - stable for 2 years, no further f/u needed

## 2019-08-01 ENCOUNTER — HOSPITAL ENCOUNTER (OUTPATIENT)
Age: 69
Setting detail: OUTPATIENT SURGERY
Discharge: HOME | End: 2019-08-01
Attending: PAIN MEDICINE | Admitting: PAIN MEDICINE
Payer: COMMERCIAL

## 2019-08-01 VITALS
RESPIRATION RATE: 16 BRPM | HEART RATE: 80 BPM | HEIGHT: 70 IN | WEIGHT: 238 LBS | TEMPERATURE: 97.2 F | OXYGEN SATURATION: 99 % | DIASTOLIC BLOOD PRESSURE: 71 MMHG | SYSTOLIC BLOOD PRESSURE: 119 MMHG | BODY MASS INDEX: 34.07 KG/M2

## 2019-08-01 LAB
GLUCOSE BLD-MCNC: 150 MG/DL (ref 70–99)
PERFORMED ON: ABNORMAL

## 2019-08-01 PROCEDURE — 6360000002 HC RX W HCPCS: Performed by: PAIN MEDICINE

## 2019-08-01 PROCEDURE — 6360000004 HC RX CONTRAST MEDICATION: Performed by: PAIN MEDICINE

## 2019-08-01 PROCEDURE — 7100000010 HC PHASE II RECOVERY - FIRST 15 MIN: Performed by: PAIN MEDICINE

## 2019-08-01 PROCEDURE — 3600000002 HC SURGERY LEVEL 2 BASE: Performed by: PAIN MEDICINE

## 2019-08-01 PROCEDURE — 2709999900 HC NON-CHARGEABLE SUPPLY: Performed by: PAIN MEDICINE

## 2019-08-01 PROCEDURE — 62323 NJX INTERLAMINAR LMBR/SAC: CPT | Performed by: PAIN MEDICINE

## 2019-08-01 PROCEDURE — 2500000003 HC RX 250 WO HCPCS: Performed by: PAIN MEDICINE

## 2019-08-01 RX ORDER — LIDOCAINE HYDROCHLORIDE 10 MG/ML
INJECTION, SOLUTION EPIDURAL; INFILTRATION; INTRACAUDAL; PERINEURAL PRN
Status: DISCONTINUED | OUTPATIENT
Start: 2019-08-01 | End: 2019-08-01 | Stop reason: ALTCHOICE

## 2019-08-01 ASSESSMENT — PAIN SCALES - GENERAL: PAINLEVEL_OUTOF10: 0

## 2019-08-01 ASSESSMENT — PAIN - FUNCTIONAL ASSESSMENT: PAIN_FUNCTIONAL_ASSESSMENT: 0-10

## 2019-08-01 NOTE — PROGRESS NOTES
Pt arrived to recovery room, alert and oriented, denies any new pain, vitals stable, site unremarkable, will monitor

## 2019-08-14 ENCOUNTER — ANTI-COAG VISIT (OUTPATIENT)
Dept: PHARMACY | Age: 69
End: 2019-08-14
Payer: COMMERCIAL

## 2019-08-14 DIAGNOSIS — I48.20 CHRONIC ATRIAL FIBRILLATION (HCC): ICD-10-CM

## 2019-08-14 LAB — INTERNATIONAL NORMALIZATION RATIO, POC: 2.2

## 2019-08-14 PROCEDURE — 99211 OFF/OP EST MAY X REQ PHY/QHP: CPT | Performed by: PHARMACIST

## 2019-08-14 PROCEDURE — 85610 PROTHROMBIN TIME: CPT | Performed by: PHARMACIST

## 2019-08-19 ENCOUNTER — OFFICE VISIT (OUTPATIENT)
Dept: FAMILY MEDICINE CLINIC | Age: 69
End: 2019-08-19
Payer: COMMERCIAL

## 2019-08-19 VITALS
DIASTOLIC BLOOD PRESSURE: 72 MMHG | SYSTOLIC BLOOD PRESSURE: 109 MMHG | WEIGHT: 241 LBS | HEART RATE: 98 BPM | BODY MASS INDEX: 34.58 KG/M2 | TEMPERATURE: 98.3 F | OXYGEN SATURATION: 97 %

## 2019-08-19 DIAGNOSIS — R42 EPISODIC LIGHTHEADEDNESS: Primary | ICD-10-CM

## 2019-08-19 DIAGNOSIS — I10 ESSENTIAL HYPERTENSION: ICD-10-CM

## 2019-08-19 PROCEDURE — 4040F PNEUMOC VAC/ADMIN/RCVD: CPT | Performed by: FAMILY MEDICINE

## 2019-08-19 PROCEDURE — G8417 CALC BMI ABV UP PARAM F/U: HCPCS | Performed by: FAMILY MEDICINE

## 2019-08-19 PROCEDURE — 1123F ACP DISCUSS/DSCN MKR DOCD: CPT | Performed by: FAMILY MEDICINE

## 2019-08-19 PROCEDURE — 3017F COLORECTAL CA SCREEN DOC REV: CPT | Performed by: FAMILY MEDICINE

## 2019-08-19 PROCEDURE — G8427 DOCREV CUR MEDS BY ELIG CLIN: HCPCS | Performed by: FAMILY MEDICINE

## 2019-08-19 PROCEDURE — 99213 OFFICE O/P EST LOW 20 MIN: CPT | Performed by: FAMILY MEDICINE

## 2019-08-19 PROCEDURE — 1036F TOBACCO NON-USER: CPT | Performed by: FAMILY MEDICINE

## 2019-08-19 RX ORDER — DOXAZOSIN 2 MG/1
1 TABLET ORAL DAILY
Qty: 30 TABLET | Refills: 0 | Status: SHIPPED
Start: 2019-08-19

## 2019-08-30 NOTE — PROGRESS NOTES
Breath 3 Inhaler 1    JARDIANCE 25 MG tablet TAKE 1 TABLET EVERY MORNING  2    glucose blood VI test strips (CRUZITO CONTOUR NEXT TEST) strip Testing 4 x a day DX: E11.42 125 each 5    insulin aspart (NOVOLOG FLEXPEN) 100 UNIT/ML injection vial Inject 25 Units into the skin 3 times daily Indications: 25-30 units 3 times a day Dispense flexpens 3 vial 3    insulin detemir (LEVEMIR FLEXTOUCH) 100 UNIT/ML injection pen Inject 50 Units into the skin 2 times daily 12 Pen 3    warfarin (COUMADIN) 10 MG tablet Take 10 mg by mouth daily Every day except Wednesday.  meclizine (ANTIVERT) 25 MG tablet Take 1 tablet by mouth 3 times daily as needed for Dizziness 40 tablet 0    Misc. Devices (Regenia Sequin) MISC Please provide pt with Acapella, patient to use 10 breath QID. 1 each 0    Albiglutide 50 MG PEN Inject into the skin Use 1 time weekly - Dr. Gus Morris      fluticasone Alfonso Kaiser) 50 MCG/ACT nasal spray 2 sprays by Nasal route daily 3 Bottle 1    Insulin Pen Needle (NOVOFINE) 30G X 8 MM MISC APPLY 1 EACH TOPICALLY 2 TIMES DAILY. 300 each 2     No current facility-administered medications on file prior to visit. Allergies:  Lyrica [pregabalin]; Reglan [metoclopramide]; Simvastatin; and Amlodipine     Review of Systems:   · Constitutional: there has been no unanticipated weight loss. There's been no change in energy level, sleep pattern, or activity level. · Eyes: No visual changes or diplopia. No scleral icterus. · ENT: No Headaches, hearing loss or vertigo. No mouth sores or sore throat. · Cardiovascular: Reviewed in HPI  · Respiratory: No cough or wheezing, no sputum production. No hematemesis. · Gastrointestinal: No abdominal pain, appetite loss, blood in stools. No change in bowel or bladder habits. · Genitourinary: No dysuria, trouble voiding, or hematuria. · Musculoskeletal:  No gait disturbance, weakness or joint complaints. · Integumentary: No rash or pruritis.   · Neurological: No headache, diplopia, change in muscle strength, numbness or tingling. No change in gait, balance, coordination, mood, affect, memory, mentation, behavior. · Psychiatric: No anxiety, no depression. · Endocrine: No malaise, fatigue or temperature intolerance. No excessive thirst, fluid intake, or urination. No tremor. · Hematologic/Lymphatic: No abnormal bruising or bleeding, blood clots or swollen lymph nodes. · Allergic/Immunologic: No nasal congestion or hives. Physical Examination:    Vitals:    09/03/19 1301   BP: 132/78   Pulse: 58   SpO2: 97%       Wt Readings from Last 3 Encounters:   09/03/19 243 lb (110.2 kg)   08/19/19 241 lb (109.3 kg)   08/01/19 238 lb (108 kg)       Constitutional and General Appearance: NAD   Respiratory:  · Normal excursion and expansion without use of accessory muscles  · Resp Auscultation: Soft breath sounds without dullness  Cardiovascular:  · The apical impulses not displaced  · Heart tones are crisp and normal  · Cervical veins are not engorged  · The carotid upstroke is normal in amplitude and contour without delay or bruit  · Irregularly irregular, No S3, No Murmur  · Peripheral pulses are symmetrical and full  · There is no clubbing, cyanosis of the extremities.   · no  milton  · Femoral Arteries: 2+ and equal  · Pedal Pulses: 2+ and equal   Abdomen:  · No masses or tenderness  · Liver/Spleen: No Abnormalities Noted  Neurological/Psychiatric:  · Alert and oriented in all spheres  · Moves all extremities well  · Exhibits normal gait balance and coordination  · No abnormalities of mood, affect, memory, mentation, or behavior are noted    Lab Results   Component Value Date    CHOL 145 06/19/2017    CHOL 145 06/07/2017    CHOL 140 02/13/2017     Lab Results   Component Value Date    TRIG 528 (H) 06/19/2017    TRIG 225 (H) 06/07/2017    TRIG 182 (H) 02/13/2017     Lab Results   Component Value Date    HDL 23 (L) 06/11/2019    HDL 25 (L) 06/19/2017    HDL 30 (L) 06/07/2017     Lab Results   Component Value Date    LDLCALC 90 12/08/2016    LDLCALC 82 12/08/2015    LDLCALC 65 06/08/2015     Lab Results   Component Value Date    LABVLDL see below 12/08/2016    LABVLDL 48 12/08/2015    LABVLDL 49 06/08/2015     No results found for: CHOLHDLRATIO     Assessment:     1. Hypertension : Stable and well controlled and will continue present medical regimen. 2. GERD (gastroesophageal reflux disease) : managed per PCP. 3. DM (diabetes mellitus)  : managed per PCP     4. Atrial fibrillation:  Mainstay of treatment is HR control and anticoagulation therapy. [facility] his coumadin therapy now (used to be Dr. Brenden Nye office). I had d/w him regarding NOAC but still wants coumadin at this time. I am willing to change to NOAC given no valvular disease on prior ECHO. Most recent EKG 5/1/19  Atrial fibrillationLeft axis deviationNonspecific ST abnormality        5. CAD (coronary artery disease):  Mild non-obstructive CAD and will continue medical management. Most recent [address] [place] [address] 3/15/19 LM: distal 40% shelf like lesion LAD: ERNESTO-2 sluggish flow, luminals LCX: small, luminals RCA: dominant, luminals LVEDP:15 LVEF: 55%. There are no concerning symptoms for angina currently. 6.  Hyperlipidemia:  Most recent 6/11/19 I personally reviewed (see above results). He had intolerance to Zocor therapy back in Jan 2013 with muscle cramping and CK was noted to 476. He continues Pravastatin 20 mg daily. He has been tolerating without diffuse muscle cramping. His TG were 315 and  is too high  PCP did not want to change regimen. Needs to watch diet more closely. Plan:  1. I have encouraged better diet and making sure diabetes is controlled as best possible. 2. Advised to follow up with Dr. Jose Enrique Marquez regarding his memory loss he follows with Neurology in [facility] and he has addressed this with him MRI brain is \"perfect. \"  3.  Meds reviewed and no refills warranted---PCP fills  4. Will cut back on Metoprolol to see if dizziness/lightheadedness/fatigue and lower heart rate resolves cut 100 mg pill in 1/2 to = 50 mg.  5. Follow up 6 months    Cost of prescription medications and patient compliance have been reviewed with patient. All questions answered. Thank you for allowing me to participate in the care of this individual.     This note was scribed in the presence of Radha Rodrigues MD by Hazel Boone RN    I, Dr. Lorenza Lozada, personally performed the services described in this documentation, as scribed by the above signed scribe in my presence. It is both accurate and complete to my knowledge. I agree with the details independently gathered by the clinical support staff, while the remaining scribed note accurately describes my personal service to the patient. Sandra Tatum.  Latosha Gipson M.D., [facility]

## 2019-09-03 ENCOUNTER — OFFICE VISIT (OUTPATIENT)
Dept: CARDIOLOGY CLINIC | Age: 69
End: 2019-09-03
Payer: COMMERCIAL

## 2019-09-03 VITALS
HEIGHT: 69 IN | OXYGEN SATURATION: 97 % | HEART RATE: 58 BPM | SYSTOLIC BLOOD PRESSURE: 132 MMHG | DIASTOLIC BLOOD PRESSURE: 78 MMHG | BODY MASS INDEX: 35.99 KG/M2 | WEIGHT: 243 LBS

## 2019-09-03 DIAGNOSIS — I10 ESSENTIAL HYPERTENSION: Chronic | ICD-10-CM

## 2019-09-03 DIAGNOSIS — E78.2 MIXED HYPERLIPIDEMIA: Chronic | ICD-10-CM

## 2019-09-03 DIAGNOSIS — I48.20 CHRONIC ATRIAL FIBRILLATION (HCC): Primary | Chronic | ICD-10-CM

## 2019-09-03 PROCEDURE — 99214 OFFICE O/P EST MOD 30 MIN: CPT | Performed by: INTERNAL MEDICINE

## 2019-09-03 PROCEDURE — 4040F PNEUMOC VAC/ADMIN/RCVD: CPT | Performed by: INTERNAL MEDICINE

## 2019-09-03 PROCEDURE — 3017F COLORECTAL CA SCREEN DOC REV: CPT | Performed by: INTERNAL MEDICINE

## 2019-09-03 PROCEDURE — G8427 DOCREV CUR MEDS BY ELIG CLIN: HCPCS | Performed by: INTERNAL MEDICINE

## 2019-09-03 PROCEDURE — 1123F ACP DISCUSS/DSCN MKR DOCD: CPT | Performed by: INTERNAL MEDICINE

## 2019-09-03 PROCEDURE — G8417 CALC BMI ABV UP PARAM F/U: HCPCS | Performed by: INTERNAL MEDICINE

## 2019-09-03 PROCEDURE — 1036F TOBACCO NON-USER: CPT | Performed by: INTERNAL MEDICINE

## 2019-09-03 RX ORDER — METOPROLOL SUCCINATE 100 MG/1
50 TABLET, EXTENDED RELEASE ORAL DAILY
Qty: 90 TABLET | Refills: 0 | Status: SHIPPED
Start: 2019-09-03 | End: 2020-03-05 | Stop reason: SDUPTHER

## 2019-09-03 RX ORDER — PRAVASTATIN SODIUM 20 MG
20 TABLET ORAL DAILY
Qty: 90 TABLET | Refills: 3 | Status: SHIPPED | OUTPATIENT
Start: 2019-09-03 | End: 2020-09-14

## 2019-09-03 NOTE — PATIENT INSTRUCTIONS
Breath 3 Inhaler 1    JARDIANCE 25 MG tablet TAKE 1 TABLET EVERY MORNING  2    glucose blood VI test strips (CRUZITO CONTOUR NEXT TEST) strip Testing 4 x a day DX: E11.42 125 each 5    insulin aspart (NOVOLOG FLEXPEN) 100 UNIT/ML injection vial Inject 25 Units into the skin 3 times daily Indications: 25-30 units 3 times a day Dispense flexpens 3 vial 3    insulin detemir (LEVEMIR FLEXTOUCH) 100 UNIT/ML injection pen Inject 50 Units into the skin 2 times daily 12 Pen 3    warfarin (COUMADIN) 10 MG tablet Take 10 mg by mouth daily Every day except Wednesday.  meclizine (ANTIVERT) 25 MG tablet Take 1 tablet by mouth 3 times daily as needed for Dizziness 40 tablet 0    Misc. Devices (Gearl Buralcides) MISC Please provide pt with Acapella, patient to use 10 breath QID. 1 each 0    Albiglutide 50 MG PEN Inject into the skin Use 1 time weekly - Dr. Chidi Fuller      fluticasone [facility]) 50 MCG/ACT nasal spray 2 sprays by Nasal route daily 3 Bottle 1    Insulin Pen Needle (NOVOFINE) 30G X 8 MM MISC APPLY 1 EACH TOPICALLY 2 TIMES DAILY. 300 each 2     No current facility-administered medications on file prior to visit. Allergies:  Lyrica [pregabalin]; Reglan [metoclopramide]; Simvastatin; and Amlodipine     Review of Systems:   · Constitutional: there has been no unanticipated weight loss. There's been no change in energy level, sleep pattern, or activity level. · Eyes: No visual changes or diplopia. No scleral icterus. · ENT: No Headaches, hearing loss or vertigo. No mouth sores or sore throat. · Cardiovascular: Reviewed in HPI  · Respiratory: No cough or wheezing, no sputum production. No hematemesis. · Gastrointestinal: No abdominal pain, appetite loss, blood in stools. No change in bowel or bladder habits. · Genitourinary: No dysuria, trouble voiding, or hematuria. · Musculoskeletal:  No gait disturbance, weakness or joint complaints. · Integumentary: No rash or pruritis.   · Neurological: No Results   Component Value Date    LDLCALC 90 12/08/2016    LDLCALC 82 12/08/2015    LDLCALC 65 06/08/2015     Lab Results   Component Value Date    LABVLDL see below 12/08/2016    LABVLDL 48 12/08/2015    LABVLDL 49 06/08/2015     No results found for: CHOLHDLRATIO     Assessment:     1. Hypertension : Stable and well controlled and will continue present medical regimen. 2. GERD (gastroesophageal reflux disease) : managed per PCP. 3. DM (diabetes mellitus)  : managed per PCP     4. Atrial fibrillation:  Mainstay of treatment is HR control and anticoagulation therapy. [facility] his cou[facility]in therapy now (used to be Dr. Melania Todd office). I had d/w him regarding NOAC but still wants coumadin at this time. I am willing to change to NOAC given no valvular disease on prior ECHO. Most recent EKG 5/1/19  Atrial fibrillationLeft axis deviationNonspecific ST abnormality        5. CAD (coronary artery disease):  Mild non-obstructive CAD and will continue medical management. Most recent [facility] 3/15/19 LM: distal 40% shelf like lesion LAD: ERNESTO-2 sluggish flow, luminals LCX: small, luminals RCA: dominant, luminals LVEDP:15 LVEF: 55% . There are no concerning symptoms for angina currently. 6.  Hyperlipidemia:  Most recent 6/11/19 I personally reviewed (see above results). He had intolerance to Zocor therapy back in Jan 2013 with muscle cramping and CK was noted to 476. He continues Pravastatin 20 mg daily. He has been tolerating without diffuse muscle cramping. His TG were 315 and  is too high  PCP did not want to change regimen. Needs to watch diet more closely. Plan:  1. I have encouraged better diet and making sure diabetes is controlled as best possible. 2. Advised to follow up with Dr. Glenna Robertson regarding his memory loss he follows with Neurology in [facility] and he has addressed this with him MRI brain is \"perfect. \"  3.  Meds reviewed and no refills warranted---PCP

## 2019-09-12 ENCOUNTER — HOSPITAL ENCOUNTER (EMERGENCY)
Age: 69
Discharge: HOME | End: 2019-09-12
Attending: EMERGENCY MEDICINE
Payer: COMMERCIAL

## 2019-09-12 ENCOUNTER — APPOINTMENT (OUTPATIENT)
Dept: GENERAL RADIOLOGY | Age: 69
End: 2019-09-12
Payer: COMMERCIAL

## 2019-09-12 VITALS
HEIGHT: 70 IN | DIASTOLIC BLOOD PRESSURE: 87 MMHG | OXYGEN SATURATION: 98 % | TEMPERATURE: 98 F | WEIGHT: 240 LBS | RESPIRATION RATE: 15 BRPM | HEART RATE: 82 BPM | BODY MASS INDEX: 34.36 KG/M2 | SYSTOLIC BLOOD PRESSURE: 129 MMHG

## 2019-09-12 DIAGNOSIS — S61.422A LACERATION OF LEFT HAND WITH FOREIGN BODY, INITIAL ENCOUNTER: Primary | ICD-10-CM

## 2019-09-12 LAB
APTT: 42.4 SEC (ref 26–36)
INR BLD: 2.19 (ref 0.86–1.14)
PROTHROMBIN TIME: 24.4 SEC (ref 9.8–13)

## 2019-09-12 PROCEDURE — 90471 IMMUNIZATION ADMIN: CPT | Performed by: EMERGENCY MEDICINE

## 2019-09-12 PROCEDURE — 90715 TDAP VACCINE 7 YRS/> IM: CPT | Performed by: EMERGENCY MEDICINE

## 2019-09-12 PROCEDURE — 85610 PROTHROMBIN TIME: CPT

## 2019-09-12 PROCEDURE — 2580000003 HC RX 258: Performed by: EMERGENCY MEDICINE

## 2019-09-12 PROCEDURE — 73130 X-RAY EXAM OF HAND: CPT

## 2019-09-12 PROCEDURE — 36415 COLL VENOUS BLD VENIPUNCTURE: CPT

## 2019-09-12 PROCEDURE — 6360000002 HC RX W HCPCS: Performed by: EMERGENCY MEDICINE

## 2019-09-12 PROCEDURE — 99283 EMERGENCY DEPT VISIT LOW MDM: CPT

## 2019-09-12 PROCEDURE — 4500000023 HC ED LEVEL 3 PROCEDURE

## 2019-09-12 PROCEDURE — 85730 THROMBOPLASTIN TIME PARTIAL: CPT

## 2019-09-12 PROCEDURE — 96365 THER/PROPH/DIAG IV INF INIT: CPT

## 2019-09-12 RX ORDER — AMOXICILLIN AND CLAVULANATE POTASSIUM 875; 125 MG/1; MG/1
1 TABLET, FILM COATED ORAL 2 TIMES DAILY
Qty: 14 TABLET | Refills: 0 | Status: SHIPPED | OUTPATIENT
Start: 2019-09-12 | End: 2019-09-19

## 2019-09-12 RX ADMIN — TETANUS TOXOID, REDUCED DIPHTHERIA TOXOID AND ACELLULAR PERTUSSIS VACCINE, ADSORBED 0.5 ML: 5; 2.5; 8; 8; 2.5 SUSPENSION INTRAMUSCULAR at 21:07

## 2019-09-12 RX ADMIN — AMPICILLIN SODIUM AND SULBACTAM SODIUM 3 G: 2; 1 INJECTION, POWDER, FOR SOLUTION INTRAMUSCULAR; INTRAVENOUS at 21:59

## 2019-09-12 ASSESSMENT — PAIN DESCRIPTION - FREQUENCY: FREQUENCY: CONTINUOUS

## 2019-09-12 ASSESSMENT — PAIN DESCRIPTION - DESCRIPTORS: DESCRIPTORS: BURNING

## 2019-09-12 ASSESSMENT — PAIN DESCRIPTION - LOCATION: LOCATION: HAND

## 2019-09-17 ENCOUNTER — OFFICE VISIT (OUTPATIENT)
Dept: ORTHOPEDIC SURGERY | Age: 69
End: 2019-09-17
Payer: COMMERCIAL

## 2019-09-17 VITALS — BODY MASS INDEX: 34.37 KG/M2 | HEIGHT: 70 IN | WEIGHT: 240.08 LBS | RESPIRATION RATE: 16 BRPM

## 2019-09-17 DIAGNOSIS — S61.412A LACERATION OF LEFT HAND WITHOUT FOREIGN BODY, INITIAL ENCOUNTER: ICD-10-CM

## 2019-09-17 PROCEDURE — 1036F TOBACCO NON-USER: CPT | Performed by: ORTHOPAEDIC SURGERY

## 2019-09-17 PROCEDURE — G8417 CALC BMI ABV UP PARAM F/U: HCPCS | Performed by: ORTHOPAEDIC SURGERY

## 2019-09-17 PROCEDURE — 1123F ACP DISCUSS/DSCN MKR DOCD: CPT | Performed by: ORTHOPAEDIC SURGERY

## 2019-09-17 PROCEDURE — 4040F PNEUMOC VAC/ADMIN/RCVD: CPT | Performed by: ORTHOPAEDIC SURGERY

## 2019-09-17 PROCEDURE — G8427 DOCREV CUR MEDS BY ELIG CLIN: HCPCS | Performed by: ORTHOPAEDIC SURGERY

## 2019-09-17 PROCEDURE — 99214 OFFICE O/P EST MOD 30 MIN: CPT | Performed by: ORTHOPAEDIC SURGERY

## 2019-09-17 PROCEDURE — 3017F COLORECTAL CA SCREEN DOC REV: CPT | Performed by: ORTHOPAEDIC SURGERY

## 2019-09-18 ENCOUNTER — TELEPHONE (OUTPATIENT)
Dept: CARDIOLOGY CLINIC | Age: 69
End: 2019-09-18

## 2019-09-18 NOTE — TELEPHONE ENCOUNTER
Yes, OK to hold coumadin. He is considered intermediate risk clinically for low risk procedure. Proceed as planned.
with Neurology in [place], [place] and he has addressed this with him MRI brain is \"perfect. \"  3. Meds reviewed and no refills warranted---PCP fills  4.  Will cut back on Metoprolol to see if dizziness/lightheadedness/fatigue and lower heart rate resolves cut 100 mg pill in 1/2 to = 50 mg.  5. Follow up 6 months

## 2019-10-02 ENCOUNTER — ANTI-COAG VISIT (OUTPATIENT)
Dept: PHARMACY | Age: 69
End: 2019-10-02
Payer: COMMERCIAL

## 2019-10-02 DIAGNOSIS — I48.20 CHRONIC ATRIAL FIBRILLATION (HCC): ICD-10-CM

## 2019-10-02 LAB — INTERNATIONAL NORMALIZATION RATIO, POC: 1.8

## 2019-10-02 PROCEDURE — 85610 PROTHROMBIN TIME: CPT

## 2019-10-02 PROCEDURE — 99211 OFF/OP EST MAY X REQ PHY/QHP: CPT

## 2019-10-02 RX ORDER — PANTOPRAZOLE SODIUM 40 MG/1
40 TABLET, DELAYED RELEASE ORAL DAILY
Qty: 90 TABLET | Refills: 1 | Status: SHIPPED | OUTPATIENT
Start: 2019-10-02 | End: 2020-03-20

## 2019-10-09 DIAGNOSIS — E11.42 DM TYPE 2 WITH DIABETIC PERIPHERAL NEUROPATHY (HCC): Primary | Chronic | ICD-10-CM

## 2019-10-17 ENCOUNTER — HOSPITAL ENCOUNTER (OUTPATIENT)
Age: 69
Setting detail: OUTPATIENT SURGERY
Discharge: HOME | End: 2019-10-17
Attending: PAIN MEDICINE | Admitting: PAIN MEDICINE
Payer: COMMERCIAL

## 2019-10-17 VITALS
HEART RATE: 84 BPM | WEIGHT: 237 LBS | OXYGEN SATURATION: 100 % | RESPIRATION RATE: 16 BRPM | HEIGHT: 70 IN | SYSTOLIC BLOOD PRESSURE: 140 MMHG | BODY MASS INDEX: 33.93 KG/M2 | TEMPERATURE: 97.6 F | DIASTOLIC BLOOD PRESSURE: 84 MMHG

## 2019-10-17 LAB
GLUCOSE BLD-MCNC: 68 MG/DL (ref 70–99)
PERFORMED ON: ABNORMAL

## 2019-10-17 PROCEDURE — 7100000010 HC PHASE II RECOVERY - FIRST 15 MIN: Performed by: PAIN MEDICINE

## 2019-10-17 PROCEDURE — 3600000002 HC SURGERY LEVEL 2 BASE: Performed by: PAIN MEDICINE

## 2019-10-17 PROCEDURE — 62321 NJX INTERLAMINAR CRV/THRC: CPT | Performed by: PAIN MEDICINE

## 2019-10-17 PROCEDURE — 2500000003 HC RX 250 WO HCPCS: Performed by: PAIN MEDICINE

## 2019-10-17 PROCEDURE — 6360000004 HC RX CONTRAST MEDICATION: Performed by: PAIN MEDICINE

## 2019-10-17 PROCEDURE — 6360000002 HC RX W HCPCS: Performed by: PAIN MEDICINE

## 2019-10-17 PROCEDURE — 2709999900 HC NON-CHARGEABLE SUPPLY: Performed by: PAIN MEDICINE

## 2019-10-17 RX ORDER — LIDOCAINE HYDROCHLORIDE 10 MG/ML
INJECTION, SOLUTION EPIDURAL; INFILTRATION; INTRACAUDAL; PERINEURAL PRN
Status: DISCONTINUED | OUTPATIENT
Start: 2019-10-17 | End: 2019-10-17 | Stop reason: ALTCHOICE

## 2019-10-17 ASSESSMENT — PAIN DESCRIPTION - DESCRIPTORS: DESCRIPTORS: ACHING

## 2019-10-17 ASSESSMENT — PAIN - FUNCTIONAL ASSESSMENT
PAIN_FUNCTIONAL_ASSESSMENT: 0-10
PAIN_FUNCTIONAL_ASSESSMENT: PREVENTS OR INTERFERES SOME ACTIVE ACTIVITIES AND ADLS

## 2019-10-18 ENCOUNTER — TELEPHONE (OUTPATIENT)
Dept: CARDIOLOGY CLINIC | Age: 69
End: 2019-10-18

## 2019-10-23 RX ORDER — ALBUTEROL SULFATE 90 UG/1
2 AEROSOL, METERED RESPIRATORY (INHALATION) EVERY 6 HOURS PRN
Qty: 3 INHALER | Refills: 1 | Status: SHIPPED | OUTPATIENT
Start: 2019-10-23 | End: 2020-07-20 | Stop reason: SDUPTHER

## 2019-10-28 RX ORDER — DILTIAZEM HYDROCHLORIDE 360 MG/1
CAPSULE, EXTENDED RELEASE ORAL
Qty: 90 CAPSULE | Refills: 3 | OUTPATIENT
Start: 2019-10-28

## 2019-11-01 ENCOUNTER — TELEPHONE (OUTPATIENT)
Dept: CARDIOLOGY CLINIC | Age: 69
End: 2019-11-01

## 2019-11-13 ENCOUNTER — ANTI-COAG VISIT (OUTPATIENT)
Dept: PHARMACY | Age: 69
End: 2019-11-13
Payer: COMMERCIAL

## 2019-11-13 ENCOUNTER — TELEPHONE (OUTPATIENT)
Dept: PULMONOLOGY | Age: 69
End: 2019-11-13

## 2019-11-13 DIAGNOSIS — I48.20 CHRONIC ATRIAL FIBRILLATION (HCC): ICD-10-CM

## 2019-11-13 LAB — INTERNATIONAL NORMALIZATION RATIO, POC: 2.4

## 2019-11-13 PROCEDURE — 85610 PROTHROMBIN TIME: CPT

## 2019-11-13 PROCEDURE — 99211 OFF/OP EST MAY X REQ PHY/QHP: CPT

## 2019-12-05 RX ORDER — LEVOTHYROXINE SODIUM 0.07 MG/1
TABLET ORAL
Qty: 90 TABLET | Refills: 1 | Status: SHIPPED | OUTPATIENT
Start: 2019-12-05 | End: 2020-05-26

## 2019-12-18 ENCOUNTER — TELEPHONE (OUTPATIENT)
Dept: PHARMACY | Age: 69
End: 2019-12-18

## 2020-01-08 ENCOUNTER — ANTI-COAG VISIT (OUTPATIENT)
Dept: PHARMACY | Age: 70
End: 2020-01-08
Payer: COMMERCIAL

## 2020-01-08 LAB — INTERNATIONAL NORMALIZATION RATIO, POC: 1.7

## 2020-01-08 PROCEDURE — 99211 OFF/OP EST MAY X REQ PHY/QHP: CPT | Performed by: PHARMACIST

## 2020-01-08 PROCEDURE — 85610 PROTHROMBIN TIME: CPT | Performed by: PHARMACIST

## 2020-01-20 ENCOUNTER — OFFICE VISIT (OUTPATIENT)
Dept: FAMILY MEDICINE CLINIC | Age: 70
End: 2020-01-20
Payer: COMMERCIAL

## 2020-01-20 VITALS
OXYGEN SATURATION: 95 % | SYSTOLIC BLOOD PRESSURE: 117 MMHG | TEMPERATURE: 98.2 F | DIASTOLIC BLOOD PRESSURE: 74 MMHG | WEIGHT: 242 LBS | HEART RATE: 89 BPM | BODY MASS INDEX: 34.72 KG/M2

## 2020-01-20 PROBLEM — G20 PARKINSON'S DISEASE (HCC): Status: RESOLVED | Noted: 2018-08-24 | Resolved: 2020-01-20

## 2020-01-20 PROBLEM — G20.A1 PARKINSON'S DISEASE: Status: RESOLVED | Noted: 2018-08-24 | Resolved: 2020-01-20

## 2020-01-20 PROCEDURE — G8417 CALC BMI ABV UP PARAM F/U: HCPCS | Performed by: FAMILY MEDICINE

## 2020-01-20 PROCEDURE — 3017F COLORECTAL CA SCREEN DOC REV: CPT | Performed by: FAMILY MEDICINE

## 2020-01-20 PROCEDURE — G8427 DOCREV CUR MEDS BY ELIG CLIN: HCPCS | Performed by: FAMILY MEDICINE

## 2020-01-20 PROCEDURE — 1036F TOBACCO NON-USER: CPT | Performed by: FAMILY MEDICINE

## 2020-01-20 PROCEDURE — 3046F HEMOGLOBIN A1C LEVEL >9.0%: CPT | Performed by: FAMILY MEDICINE

## 2020-01-20 PROCEDURE — 1123F ACP DISCUSS/DSCN MKR DOCD: CPT | Performed by: FAMILY MEDICINE

## 2020-01-20 PROCEDURE — 2022F DILAT RTA XM EVC RTNOPTHY: CPT | Performed by: FAMILY MEDICINE

## 2020-01-20 PROCEDURE — 4040F PNEUMOC VAC/ADMIN/RCVD: CPT | Performed by: FAMILY MEDICINE

## 2020-01-20 PROCEDURE — 99214 OFFICE O/P EST MOD 30 MIN: CPT | Performed by: FAMILY MEDICINE

## 2020-01-20 PROCEDURE — G8484 FLU IMMUNIZE NO ADMIN: HCPCS | Performed by: FAMILY MEDICINE

## 2020-01-20 RX ORDER — WARFARIN SODIUM 2 MG/1
2 TABLET ORAL DAILY
Qty: 90 TABLET | Refills: 3 | Status: SHIPPED | OUTPATIENT
Start: 2020-01-20 | End: 2021-04-01 | Stop reason: SDUPTHER

## 2020-01-20 RX ORDER — WARFARIN SODIUM 5 MG/1
TABLET ORAL
Qty: 180 TABLET | Refills: 3 | Status: SHIPPED | OUTPATIENT
Start: 2020-01-20 | End: 2021-04-01 | Stop reason: SDUPTHER

## 2020-01-20 RX ORDER — WARFARIN SODIUM 10 MG/1
10 TABLET ORAL DAILY
Qty: 90 TABLET | Refills: 3 | Status: SHIPPED | OUTPATIENT
Start: 2020-01-20 | End: 2021-04-01 | Stop reason: SDUPTHER

## 2020-01-20 ASSESSMENT — PATIENT HEALTH QUESTIONNAIRE - PHQ9
2. FEELING DOWN, DEPRESSED OR HOPELESS: 0
SUM OF ALL RESPONSES TO PHQ9 QUESTIONS 1 & 2: 0
1. LITTLE INTEREST OR PLEASURE IN DOING THINGS: 0
SUM OF ALL RESPONSES TO PHQ QUESTIONS 1-9: 0
SUM OF ALL RESPONSES TO PHQ QUESTIONS 1-9: 0

## 2020-01-20 NOTE — PROGRESS NOTES
Subjective:   He presents for follow up of his diabetes atrial fibrillation thyroid disease hypertension and painful diabetic neuropathy in his feet. He would like to try the anodyne therapy that they have at [facility] to see if it would help his diabetic neuropathy pain. Still has balance issues related to neuropathy. About 2 years ago a neurologist thought he may have early Parkinson's disease but when he saw him back last year he thought it was just a tremor and not Parkinson's disease the patient reports. He is still on Coumadin for anticoagulation secondary to atrial fibrillation. His Coumadin is followed by the Coumadin clinic and adjusted as needed. He does need refills. His blood pressure is controlled. He last saw his cardiologist back in September and saw the lung specialist in November. He had all of his blood work done December 10 by endocrinology including lipid panel and A1c. His last hemoglobin A1c was 5.9%. He is allergic to lyrica [pregabalin]; reglan [metoclopramide]; simvastatin; and amlodipine. He   reports that he quit smoking about 30 years ago. His smoking use included cigarettes. He has a 20.00 pack-year smoking history. He has never used smokeless tobacco. Review of systems negative for chest pain swelling abdominal pain rectal bleeding  Objective:   /74   Pulse 89   Temp 98.2 °F (36.8 °C) (Oral)   Wt 242 lb (109.8 kg)   SpO2 95%   BMI 34.72 kg/m²   BP Readings from Last 3 Encounters:   01/20/20 117/74   11/19/19 102/70   10/17/19 (!) 140/84     Physical Exam  Vitals signs reviewed. Constitutional:       Appearance: He is well-developed. He is obese. He is not toxic-appearing. HENT:      Head: Normocephalic. Eyes:      General: No scleral icterus. Right eye: No discharge. Left eye: No discharge. Conjunctiva/sclera: Conjunctivae normal.   Neck:      Musculoskeletal: Neck supple. Thyroid: No thyroid mass or thyromegaly. follow-up appointment. Aurora Long M.D. Parts of this note were completed using voice recognition transcription. Every effort was made to ensure accuracy; however, inadvertent transcription errors may be present.

## 2020-01-20 NOTE — PATIENT INSTRUCTIONS
Please read the healthy family handout that you were given and share it with your family. Please compare this printed medication list with your medications at home to be sure they are the same. If you have any medications that are different please contact us immediately at [phone]. Also review your allergies that we have listed, these may also include medications that you have not been able to tolerate, make sure everything listed is correct. If you have any allergies that are different please contact us immediately at [phone].

## 2020-01-21 ENCOUNTER — TELEPHONE (OUTPATIENT)
Dept: FAMILY MEDICINE CLINIC | Age: 70
End: 2020-01-21

## 2020-01-21 NOTE — TELEPHONE ENCOUNTER
Left message for patient to call back, when he was in the office yesterday notified him he needed colonoscopy stated he had done did not think he needed another one yet, found note from [facility]el[facility] 23 1/24/15 needs to have repeat in 5 years.

## 2020-01-29 ENCOUNTER — ANTI-COAG VISIT (OUTPATIENT)
Dept: PHARMACY | Age: 70
End: 2020-01-29
Payer: COMMERCIAL

## 2020-01-29 LAB — INTERNATIONAL NORMALIZATION RATIO, POC: 1.9

## 2020-01-29 PROCEDURE — 99211 OFF/OP EST MAY X REQ PHY/QHP: CPT | Performed by: PHARMACIST

## 2020-01-29 PROCEDURE — 85610 PROTHROMBIN TIME: CPT | Performed by: PHARMACIST

## 2020-01-31 ENCOUNTER — TELEPHONE (OUTPATIENT)
Dept: PULMONOLOGY | Age: 70
End: 2020-01-31

## 2020-02-11 ENCOUNTER — OFFICE VISIT (OUTPATIENT)
Dept: FAMILY MEDICINE CLINIC | Age: 70
End: 2020-02-11
Payer: COMMERCIAL

## 2020-02-11 VITALS
SYSTOLIC BLOOD PRESSURE: 134 MMHG | WEIGHT: 242.13 LBS | DIASTOLIC BLOOD PRESSURE: 80 MMHG | HEART RATE: 96 BPM | TEMPERATURE: 97.9 F | BODY MASS INDEX: 34.74 KG/M2 | OXYGEN SATURATION: 99 %

## 2020-02-11 PROCEDURE — 4040F PNEUMOC VAC/ADMIN/RCVD: CPT | Performed by: NURSE PRACTITIONER

## 2020-02-11 PROCEDURE — G8427 DOCREV CUR MEDS BY ELIG CLIN: HCPCS | Performed by: NURSE PRACTITIONER

## 2020-02-11 PROCEDURE — 3017F COLORECTAL CA SCREEN DOC REV: CPT | Performed by: NURSE PRACTITIONER

## 2020-02-11 PROCEDURE — G8484 FLU IMMUNIZE NO ADMIN: HCPCS | Performed by: NURSE PRACTITIONER

## 2020-02-11 PROCEDURE — 99214 OFFICE O/P EST MOD 30 MIN: CPT | Performed by: NURSE PRACTITIONER

## 2020-02-11 PROCEDURE — G8417 CALC BMI ABV UP PARAM F/U: HCPCS | Performed by: NURSE PRACTITIONER

## 2020-02-11 PROCEDURE — 1036F TOBACCO NON-USER: CPT | Performed by: NURSE PRACTITIONER

## 2020-02-11 PROCEDURE — 1123F ACP DISCUSS/DSCN MKR DOCD: CPT | Performed by: NURSE PRACTITIONER

## 2020-02-11 RX ORDER — CEFDINIR 300 MG/1
300 CAPSULE ORAL 2 TIMES DAILY
Qty: 20 CAPSULE | Refills: 0 | Status: SHIPPED | OUTPATIENT
Start: 2020-02-11 | End: 2020-02-21

## 2020-02-11 RX ORDER — PREDNISONE 10 MG/1
10 TABLET ORAL DAILY
Qty: 10 TABLET | Refills: 0 | Status: SHIPPED | OUTPATIENT
Start: 2020-02-11 | End: 2020-02-21

## 2020-02-11 NOTE — PROGRESS NOTES
Take 0.5 tablets by mouth daily 90 tablet 0    pravastatin (PRAVACHOL) 20 MG tablet Take 1 tablet by mouth daily 90 tablet 3    doxazosin (CARDURA) 2 MG tablet Take 0.5 tablets by mouth daily (dose reduction) 30 tablet 0    diclofenac sodium 1 % GEL APPLY 4 G TOPICALLY 4 TIMES DAILY 100 g 3    nystatin (MYCOSTATIN) 500529 UNIT/GM powder Apply 3 times daily. 1 Bottle 5    fenofibrate (TRICOR) 54 MG tablet Take 1 tablet by mouth daily 90 tablet 0    furosemide (LASIX) 40 MG tablet Take 1 tablet by mouth daily (Patient taking differently: Take 40 mg by mouth daily Taking) 90 tablet 5    lisinopril (PRINIVIL;ZESTRIL) 10 MG tablet Take 1 tablet by mouth daily 90 tablet 5    ipratropium-albuterol (DUONEB) 0.5-2.5 (3) MG/3ML SOLN nebulizer solution Inhale 3 mLs into the lungs every 6 hours as needed for Shortness of Breath 360 mL 5    ADVAIR DISKUS 250-50 MCG/DOSE AEPB USE 1 INHALATION TWO TIMES  DAILY 180 each 1    sildenafil (VIAGRA) 100 MG tablet Take 1 tablet by mouth as needed for Erectile Dysfunction 6 tablet 3    JARDIANCE 25 MG tablet TAKE 1 TABLET EVERY MORNING  2    glucose blood VI test strips (CRUZITO CONTOUR NEXT TEST) strip Testing 4 x a day DX: E11.42 125 each 5    insulin aspart (NOVOLOG FLEXPEN) 100 UNIT/ML injection vial Inject 25 Units into the skin 3 times daily Indications: 25-30 units 3 times a day Dispense flexpens 3 vial 3    insulin detemir (LEVEMIR FLEXTOUCH) 100 UNIT/ML injection pen Inject 50 Units into the skin 2 times daily 12 Pen 3    meclizine (ANTIVERT) 25 MG tablet Take 1 tablet by mouth 3 times daily as needed for Dizziness 40 tablet 0    Albiglutide 50 MG PEN Inject into the skin Use 1 time weekly - Dr. Pamela Martinez      fluticasone (FLONASE) 50 MCG/ACT nasal spray 2 sprays by Nasal route daily 3 Bottle 1    Insulin Pen Needle (NOVOFINE) 30G X 8 MM MISC APPLY 1 EACH TOPICALLY 2 TIMES DAILY. 300 each 2     No current facility-administered medications for this visit. Patient's past medical history, surgical history, family history, medications,  and allergies  were all reviewed and updated as appropriate today. Review of Systems  See HPI    Physical Exam  Vitals signs and nursing note reviewed. Constitutional:       General: He is not in acute distress. Appearance: He is well-developed. He is not ill-appearing or toxic-appearing. HENT:      Head: Normocephalic and atraumatic. Right Ear: Tympanic membrane normal.      Left Ear: Tympanic membrane normal.      Nose: Congestion present. Mouth/Throat:      Mouth: Mucous membranes are moist.      Pharynx: No posterior oropharyngeal erythema. Eyes:      Extraocular Movements: Extraocular movements intact. Pupils: Pupils are equal, round, and reactive to light. Neck:      Musculoskeletal: Neck supple. Cardiovascular:      Rate and Rhythm: Normal rate. Rhythm irregular. Pulses: Normal pulses. Heart sounds: Normal heart sounds. No murmur. Pulmonary:      Effort: Pulmonary effort is normal.      Breath sounds: Normal breath sounds. Musculoskeletal:      Right lower leg: No edema. Left lower leg: No edema. Lymphadenopathy:      Cervical: No cervical adenopathy. Skin:     General: Skin is warm and dry. Capillary Refill: Capillary refill takes 2 to 3 seconds. Neurological:      Mental Status: He is alert and oriented to person, place, and time. Psychiatric:         Behavior: Behavior normal.         Thought Content: Thought content normal.         Vitals:    02/11/20 1658   BP: 134/80   Pulse: 96   Temp: 97.9 °F (36.6 °C)   TempSrc: Oral   SpO2: 99%   Weight: 242 lb 2 oz (109.8 kg)           ESME Townsend-CNP    The note was completedusing Dragon voice recognition transcription. Every effort was made to ensure accuracy; however, inadvertent  transcription errors may be present despite my best efforts to edit errors.

## 2020-02-11 NOTE — PATIENT INSTRUCTIONS
Please read the healthy family handout that you were given and share it with your family. Please compare this printed medication list with your medications at home to be sure they are the same. If you have any medications that are different please contact us immediately at [phone]. Also review your allergies that we have listed, these may also include medications that you have not been able to tolerate, make sure everything listed is correct. If you have any allergies that are different please contact us immediately at [phone].

## 2020-02-26 ENCOUNTER — ANTI-COAG VISIT (OUTPATIENT)
Dept: PHARMACY | Age: 70
End: 2020-02-26
Payer: COMMERCIAL

## 2020-02-26 LAB — INTERNATIONAL NORMALIZATION RATIO, POC: 2.5

## 2020-02-26 PROCEDURE — 99211 OFF/OP EST MAY X REQ PHY/QHP: CPT | Performed by: PHARMACIST

## 2020-02-26 PROCEDURE — 85610 PROTHROMBIN TIME: CPT | Performed by: PHARMACIST

## 2020-02-26 NOTE — PROGRESS NOTES
is here for management of anticoagulation for AFib. PMH also significant for DM, Hyopthyroid, GERD, HTN, CAD, COPD. He presents today w/out complaint. Pt verifies dosing regimen as listed above. Pt denies s/s bleeding/bruising/swelling/SOB. No BRBPR. No melena. Reviewed pt medication list.  No changes in OTC/herbal medications. Reviewed dietary concerns. Pt states that he does not pay attention to diet, including for his diabetes. No missed doses. Pt states no changes or missed doses. INR 2.5 is within the acceptable therapeutic range of 2-3. Recommend to continue dose of 10 mg Sunday/Wed/Fri and 7.5 mg every other day. Patient has 5 mg and 2 mg tablets. Will continue to monitor and check INR in 6 weeks (patient normally does 6 weeks)  Dosing reminder card given with phone number, appointment date and time.   Return to clinic: 4/8/20 @ 1:00 pm     Barbra Vásquez PharmD 1:32 PM 2/26/20

## 2020-03-04 NOTE — PROGRESS NOTES
[address]   Cardiac Followup    Referring Provider:  Aurora Officer, MD     Chief Complaint   Patient presents with    6 Month Follow-Up      Subjective: Patient is being seen today for cardiology follow up for mild non-obstructive CAD,  HTN, and chronic afib; no new complaints today      Past Medical History:    Mr. Samira David is a 72yo male with PMH mild non-obstructive CAD, GERD, HTN, CLARISSE (does not use CPAP often), and chronic afib on coumadin. Note LHC on 8/1/06 showed LVEF of 60% with 20% stenosis in the 1st diagonal; LI in the LAD, and very mild non-obstructive CAD. [id number] [employer] manages his PT/INR and adjusts his medications. He underwent  cardiac cath on 8/9/13 which showed mild non-obstructive disease managed medically. No need for PCI. Note SAJI BLE 1/4/2016 showed no evidence of stenosis in BLE. Normal SAJI's ankle-brachial  measured at 1.19 on the right and 1.25 on the left. Most recent Cardiac event monitor 6/20-7/3/17 AFIB showed an average HR of 85 ()  was brief. Carotid US 2/22/18 was negative for blockage. Due to SOB complaints he underwent most recent Lexiscan myoview stress test 3/7/19 LVEF 54%  Apical hypokinesis  Fixed apical defect likely due to scar; No ischemia. Most recent ECHO 3/7/19 showed EF=35-40% with global HK (2012 and 2017 EF=55-60%). Due to worsening LVEF and SOB he underwent most recent [facility] 3/15/19 LM: distal 40% shelf like lesion LAD: ERNESTO-2 sluggish flow, luminals LCX: small, luminals RCA: dominant, luminals LVEDP:15 LVEF: 55% RHC (signficant resp variation) RA: 11 RV: 30/10  PA:  30/16 and mean of 20 PCWP: 15 Sats: On RA Ao: 94% RA: 61% PA: 62% CO/CI 4.28/1.8. Note EKG 5/1/19  Atrial fibrillation; Left axis deviation; nonspecific ST abnormality  He reports his wife stopped his Amlodipine started by Dr. Angie Berry after cath due to swollen feet. Sluggish blood flow in LAD is reason amlodipine was started.   Dr Aparna Cabral lowered his Cardura from mother; Hypertension in his father and mother; Other in his father and mother; Prostate Cancer in his maternal uncle. Home Medications:  Current Outpatient Medications on File Prior to Visit   Medication Sig Dispense Refill    TRULICITY 1.5 ME/0.3EG SOPN       warfarin (COUMADIN) 2 MG tablet Take 1 tablet by mouth daily 90 tablet 3    warfarin (COUMADIN) 10 MG tablet Take 1 tablet by mouth daily Every day except Wednesday. 90 tablet 3    warfarin (COUMADIN) 5 MG tablet TAKE 1 TO 2 TABLETS BY  MOUTH DAILY AS DIRECTED 180 tablet 3    levothyroxine (SYNTHROID) 75 MCG tablet TAKE 1 TABLET BY MOUTH  DAILY 90 tablet 1    albuterol sulfate HFA (PROAIR HFA) 108 (90 Base) MCG/ACT inhaler Inhale 2 puffs into the lungs every 6 hours as needed for Wheezing or Shortness of Breath 3 Inhaler 1    pantoprazole (PROTONIX) 40 MG tablet TAKE 1 TABLET BY MOUTH  DAILY 90 tablet 1    metoprolol succinate (TOPROL XL) 100 MG extended release tablet Take 0.5 tablets by mouth daily 90 tablet 0    pravastatin (PRAVACHOL) 20 MG tablet Take 1 tablet by mouth daily 90 tablet 3    doxazosin (CARDURA) 2 MG tablet Take 0.5 tablets by mouth daily (dose reduction) 30 tablet 0    diclofenac sodium 1 % GEL APPLY 4 G TOPICALLY 4 TIMES DAILY 100 g 3    nystatin (MYCOSTATIN) 406669 UNIT/GM powder Apply 3 times daily.  1 Bottle 5    fenofibrate (TRICOR) 54 MG tablet Take 1 tablet by mouth daily 90 tablet 0    furosemide (LASIX) 40 MG tablet Take 1 tablet by mouth daily (Patient taking differently: Take 40 mg by mouth daily Taking) 90 tablet 5    lisinopril (PRINIVIL;ZESTRIL) 10 MG tablet Take 1 tablet by mouth daily 90 tablet 5    ipratropium-albuterol (DUONEB) 0.5-2.5 (3) MG/3ML SOLN nebulizer solution Inhale 3 mLs into the lungs every 6 hours as needed for Shortness of Breath 360 mL 5    ADVAIR DISKUS 250-50 MCG/DOSE AEPB USE 1 INHALATION TWO TIMES  DAILY 180 each 1    sildenafil (VIAGRA) 100 MG tablet Take 1 tablet by mouth as excessive thirst, fluid intake, or urination. No tremor. · Hematologic/Lymphatic: No abnormal bruising or bleeding, blood clots or swollen lymph nodes. · Allergic/Immunologic: No nasal congestion or hives. Physical Examination:    Vitals:    03/05/20 1358   BP: 108/72   Pulse: 52   SpO2: 98%       Wt Readings from Last 3 Encounters:   03/05/20 243 lb (110.2 kg)   02/11/20 242 lb 2 oz (109.8 kg)   01/20/20 242 lb (109.8 kg)       Constitutional and General Appearance: NAD   Respiratory:  · Normal excursion and expansion without use of accessory muscles  · Resp Auscultation: Soft breath sounds without dullness  Cardiovascular:  · The apical impulses not displaced  · Heart tones are crisp and normal  · Cervical veins are not engorged  · The carotid upstroke is normal in amplitude and contour without delay or bruit  · Irregularly irregular, No S3, No Murmur  · Peripheral pulses are symmetrical and full  · There is no clubbing, cyanosis of the extremities.   · no edema  · Femoral Arteries: 2+ and equal  · Pedal Pulses: 2+ and equal   Abdomen:  · No masses or tenderness  · Liver/Spleen: No Abnormalities Noted  Neurological/Psychiatric:  · Alert and oriented in all spheres  · Moves all extremities well  · Exhibits normal gait balance and coordination  · No abnormalities of mood, affect, memory, mentation, or behavior are noted  ·   Lipids 6/11/19   HDL 23 LDL 52  Lab Results   Component Value Date    CHOL 145 06/19/2017    CHOL 145 06/07/2017    CHOL 140 02/13/2017     Lab Results   Component Value Date    TRIG 528 (H) 06/19/2017    TRIG 225 (H) 06/07/2017    TRIG 182 (H) 02/13/2017     Lab Results   Component Value Date    HDL 23 (L) 06/11/2019    HDL 25 (L) 06/19/2017    HDL 30 (L) 06/07/2017     Lab Results   Component Value Date    LDLCALC 90 12/08/2016    LDLCALC 82 12/08/2015    LDLCALC 65 06/08/2015     Lab Results   Component Value Date    LABVLDL see below 12/08/2016    LABVLDL 48 12/08/2015 LABVLDL 49 06/08/2015     No results found for: CHOLHDLRATIO     Assessment:     1. Hypertension : Stable and well controlled and will continue present medical regimen. 2. GERD (gastroesophageal reflux disease) : managed per PCP. 3. DM (diabetes mellitus)  : managed per PCP     4. Atrial fibrillation:  Mainstay of treatment is HR control and anticoagulation therapy. [facility] manages his coumadin therapy now (used to be Dr. Alma Rosa [facility] office). I have d/w him changing to NOAC but still wants coumadin at this time. I am willing to change to NOAC given no valvular disease on prior ECHO. Most recent EKG 5/1/19  Atrial fibrillationLeft axis deviation; Nonspecific ST abnormality      5. CAD (coronary artery disease):  Mild non-obstructive CAD and will continue medical management. Most recent [address] [place] [address] 3/15/19 LM: distal 40% shelf like lesion LAD: ERNESTO-2 sluggish flow, luminals LCX: small, luminals RCA: dominant, luminals LVEDP:15 LVEF: 55%. There are no concerning symptoms for angina currently. 6.  Hyperlipidemia:  Most recent 6/11/19 I personally reviewed   HDL 23 LDL 52. He had intolerance to Zocor therapy back in Jan 2013 with muscle cramping and CK was noted to 476. He continues Pravastatin 20mg daily. He has been tolerating without diffuse muscle cramping. His TG were 315 and is too high  PCP did not want to change regimen. Needs to watch diet more closely. Plan:  1. I have encouraged better diet and making sure diabetes is controlled as best possible. 2. Advised to use Lasix as needed for swelling in feet,legs,abdomen weight gain or shortness of breath  3. Meds reviewed and no refills warranted---PCP fills  4. Will need to recheck fasting labs if TG remain elevated I recommend he increase his fenofibrate dose to 154mg and add vascepa 2gm BID if able to afford. Dr. Peña Bernal will recheck labs this summer and would follow prior recs.    5. Follow up 6 months    Cost of prescription medications and patient compliance have been reviewed with patient. All questions answered. Thank you for allowing me to participate in the care of this individual.     This note was scribed in the presence of Maksim Roberts MD by Richard Valdes RN    I, Dr. Edwina Corrales, personally performed the services described in this documentation, as scribed by the above signed scribe in my presence. It is both accurate and complete to my knowledge. I agree with the details independently gathered by the clinical support staff, while the remaining scribed note accurately describes my personal service to the patient. Fabián Francisco.  Cherry Higuera M.D., [facility] [place]

## 2020-03-05 ENCOUNTER — OFFICE VISIT (OUTPATIENT)
Dept: CARDIOLOGY CLINIC | Age: 70
End: 2020-03-05
Payer: COMMERCIAL

## 2020-03-05 VITALS
WEIGHT: 243 LBS | OXYGEN SATURATION: 98 % | HEART RATE: 52 BPM | BODY MASS INDEX: 34.79 KG/M2 | DIASTOLIC BLOOD PRESSURE: 72 MMHG | HEIGHT: 70 IN | SYSTOLIC BLOOD PRESSURE: 108 MMHG

## 2020-03-05 PROCEDURE — 4040F PNEUMOC VAC/ADMIN/RCVD: CPT | Performed by: INTERNAL MEDICINE

## 2020-03-05 PROCEDURE — 99214 OFFICE O/P EST MOD 30 MIN: CPT | Performed by: INTERNAL MEDICINE

## 2020-03-05 PROCEDURE — 1123F ACP DISCUSS/DSCN MKR DOCD: CPT | Performed by: INTERNAL MEDICINE

## 2020-03-05 PROCEDURE — 3017F COLORECTAL CA SCREEN DOC REV: CPT | Performed by: INTERNAL MEDICINE

## 2020-03-05 PROCEDURE — 1036F TOBACCO NON-USER: CPT | Performed by: INTERNAL MEDICINE

## 2020-03-05 PROCEDURE — G8427 DOCREV CUR MEDS BY ELIG CLIN: HCPCS | Performed by: INTERNAL MEDICINE

## 2020-03-05 PROCEDURE — G8417 CALC BMI ABV UP PARAM F/U: HCPCS | Performed by: INTERNAL MEDICINE

## 2020-03-05 PROCEDURE — G8484 FLU IMMUNIZE NO ADMIN: HCPCS | Performed by: INTERNAL MEDICINE

## 2020-03-05 RX ORDER — FENOFIBRATE 54 MG/1
54 TABLET ORAL DAILY
Qty: 90 TABLET | Refills: 3 | Status: SHIPPED | OUTPATIENT
Start: 2020-03-05 | End: 2021-02-26

## 2020-03-05 RX ORDER — LISINOPRIL 10 MG/1
10 TABLET ORAL DAILY
Qty: 90 TABLET | Refills: 3 | Status: SHIPPED | OUTPATIENT
Start: 2020-03-05 | End: 2021-03-29

## 2020-03-05 RX ORDER — DULAGLUTIDE 1.5 MG/.5ML
1.5 INJECTION, SOLUTION SUBCUTANEOUS WEEKLY
COMMUNITY
Start: 2019-12-24

## 2020-03-05 RX ORDER — METOPROLOL SUCCINATE 100 MG/1
50 TABLET, EXTENDED RELEASE ORAL DAILY
Qty: 90 TABLET | Refills: 3 | Status: SHIPPED | OUTPATIENT
Start: 2020-03-05 | End: 2021-04-23

## 2020-03-05 NOTE — LETTER
worsening LVEF and SOB he underwent most recent [facility] 3/15/19 LM: distal 40% shelf like lesion LAD: ERNESTO-2 sluggish flow, luminals LCX: small, luminals RCA: dominant, luminals LVEDP:15 LVEF: 55% RHC (signficant resp variation) RA: 11 RV: 30/10  PA:  30/16 and mean of 20 PCWP: 15 Sats: On RA Ao: 94% RA: 61% PA: 62% CO/CI 4.28/1.8. Note EKG 5/1/19  Atrial fibrillation; Left axis deviation; nonspecific ST abnormality  He reports his wife stopped his Amlodipine started by Dr. Jelani Bae after cath due to swollen feet. Sluggish blood flow in LAD is reason amlodipine was started. Dr Bob Solis lowered his Cardura from 2mg to 1 mg due to dizziness 8/19/19 and I decreased toprol XL from 100 to 50mg qd. He only takes lasix PRN even though prescribed daily. History of Present Illness: Today, he reports feeling good overall. No cardiac complaints. He continues to report his memory has declined and has balance issues. He has neurologist in [place] who follows him but cannot find definite etiology. Past Medical History:   has a past medical history of Arthritis, Asthma, Atrial fibrillation ([address] BPH (benign prostatic hypertrophy), CAD (coronary artery disease), Cervical disc disorder, unspecified, Chronic back pain, COPD, Diabetes mellitus ([address] DM (diabetes mellitus) with complications ([address] Elevated CPK, Erectile dysfunction, GERD (gastroesophageal reflux disease), Hypertension, Hypotestosteronism, Hypothyroidism, Neuropathy, CLARISSE (obstructive sleep apnea), Paroxysmal atrial fibrillation ([address] RBBB (right bundle branch block), and Vitamin D deficiency. Surgical History:   has a past surgical history that includes back surgery; Foot surgery; cardiovascular stress test; Cardiac catheterization (08/01/2006); Carpal tunnel release; Cataract removal with implant (3/9/2011); Cataract removal with implant; Diagnostic Cardiac Cath Lab Procedure (8/9/13);  Colonoscopy (2014); Cholecystectomy (6/10/2015); Endoscopy, colon, diagnostic (06/28/2016); Cardiac catheterization (08/09/2013); Cardiac catheterization (03/15/2019); epidural steroid injection (Left, 8/1/2019); and epidural steroid injection (Right, 10/17/2019). Social History:   reports that he quit smoking about 30 years ago. His smoking use included cigarettes. He has a 20.00 pack-year smoking history. He has never used smokeless tobacco. He reports current alcohol use. He reports that he does not use drugs. Family History:  family history includes Diabetes in his father and mother; Emphysema in his paternal grandfather; Heart Failure in his father and mother; Hypertension in his father and mother; Other in his father and mother; Prostate Cancer in his maternal uncle. Home Medications:  Current Outpatient Medications on File Prior to Visit   Medication Sig Dispense Refill    TRULICITY 1.5 UB/2.8PS SOPN       warfarin (COUMADIN) 2 MG tablet Take 1 tablet by mouth daily 90 tablet 3    warfarin (COUMADIN) 10 MG tablet Take 1 tablet by mouth daily Every day except Wednesday.  90 tablet 3    warfarin (COUMADIN) 5 MG tablet TAKE 1 TO 2 TABLETS BY  MOUTH DAILY AS DIRECTED 180 tablet 3    levothyroxine (SYNTHROID) 75 MCG tablet TAKE 1 TABLET BY MOUTH  DAILY 90 tablet 1    albuterol sulfate HFA (PROAIR HFA) 108 (90 Base) MCG/ACT inhaler Inhale 2 puffs into the lungs every 6 hours as needed for Wheezing or Shortness of Breath 3 Inhaler 1    pantoprazole (PROTONIX) 40 MG tablet TAKE 1 TABLET BY MOUTH  DAILY 90 tablet 1    metoprolol succinate (TOPROL XL) 100 MG extended release tablet Take 0.5 tablets by mouth daily 90 tablet 0    pravastatin (PRAVACHOL) 20 MG tablet Take 1 tablet by mouth daily 90 tablet 3    doxazosin (CARDURA) 2 MG tablet Take 0.5 tablets by mouth daily (dose reduction) 30 tablet 0    diclofenac sodium 1 % GEL APPLY 4 G TOPICALLY 4 TIMES DAILY 100 g 3 · ENT: No Headaches, hearing loss or vertigo. No mouth sores or sore throat. · Cardiovascular: Reviewed in HPI  · Respiratory: No cough or wheezing, no sputum production. No hematemesis. · Gastrointestinal: No abdominal pain, appetite loss, blood in stools. No change in bowel or bladder habits. · Genitourinary: No dysuria, trouble voiding, or hematuria. · Musculoskeletal:  No gait disturbance, weakness or joint complaints. · Integumentary: No rash or pruritis. · Neurological: No headache, diplopia, change in muscle strength, numbness or tingling. No change in gait, balance, coordination, mood, affect, memory, mentation, behavior. · Psychiatric: No anxiety, no depression. · Endocrine: No malaise, fatigue or temperature intolerance. No excessive thirst, fluid intake, or urination. No tremor. · Hematologic/Lymphatic: No abnormal bruising or bleeding, blood clots or swollen lymph nodes. · Allergic/Immunologic: No nasal congestion or hives. Physical Examination:    Vitals:    03/05/20 1358   BP: 108/72   Pulse: 52   SpO2: 98%       Wt Readings from Last 3 Encounters:   03/05/20 243 lb (110.2 kg)   02/11/20 242 lb 2 oz (109.8 kg)   01/20/20 242 lb (109.8 kg)       Constitutional and General Appearance: NAD   Respiratory:  · Normal excursion and expansion without use of accessory muscles  · Resp Auscultation: Soft breath sounds without dullness  Cardiovascular:  · The apical impulses not displaced  · Heart tones are crisp and normal  · Cervical veins are not engorged  · The carotid upstroke is normal in amplitude and contour without delay or bruit  · Irregularly irregular, No S3, No Murmur  · Peripheral pulses are symmetrical and full  · There is no clubbing, cyanosis of the extremities.   · no edema  · Femoral Arteries: 2+ and equal  · Pedal Pulses: 2+ and equal   Abdomen:  · No masses or tenderness  · Liver/Spleen: No Abnormalities Noted  Neurological/Psychiatric:  · Alert and oriented in all spheres

## 2020-03-05 NOTE — PATIENT INSTRUCTIONS
Plan:  1. I have encouraged better diet and making sure diabetes is controlled as best possible. 2. Advised to use Lasix as needed for swelling in feet,legs,abdomen weight gain or shortness of breath  3. Meds reviewed and no refills warranted---PCP fills  4. Will need to recheck fasting labs if TG remain elevated I recommend he increase his fenofibrate dose. Advised to check with DR Shaheen Royal as to when he wants to recheck labs  5.  Follow up 6 months

## 2020-03-20 RX ORDER — PANTOPRAZOLE SODIUM 40 MG/1
40 TABLET, DELAYED RELEASE ORAL DAILY
Qty: 90 TABLET | Refills: 1 | Status: SHIPPED | OUTPATIENT
Start: 2020-03-20 | End: 2020-09-08

## 2020-03-26 ENCOUNTER — TELEPHONE (OUTPATIENT)
Dept: FAMILY MEDICINE CLINIC | Age: 70
End: 2020-03-26

## 2020-03-26 NOTE — TELEPHONE ENCOUNTER
Pt spouse, Mohsen Garrett, called stating you had written an order for him to have anodyne light therapy at [facility] for neuropathy. Annamarie stated [facility] stopped all therapies 2 weeks ago due to the pandemic going on. Annamarie states pt is in so much pain. She called and spoke to [employer] who stated they will cover an at home machine  for anodyne light therapy, if you will write the order. If you agree she will call them back to see where we need to send the order to. Please Advise.  Thank You

## 2020-04-08 ENCOUNTER — TELEPHONE (OUTPATIENT)
Dept: PULMONOLOGY | Age: 70
End: 2020-04-08

## 2020-04-08 NOTE — TELEPHONE ENCOUNTER
Patient cancelled appointment on 4/16/20 with Dr. Rosa M Marc for 6 month Asthma/CLARISSE. Reason: COVID19    Patient did not reschedule appointment. Offered VV but pt declines. Appointment rescheduled for . Will call pt when able to r/s. Last OV 2/20/19:    ASSESSMENT AND PLAN:     Asthma moderate persistent with AE  -Prednisone taper and doxy x 10 days  -   Continue prednisone 10 mg after taper for the rest of the month  Per hx. Patient states that change in weather is a trigger. PFTs did not show obstruction  - continue Advair, lower dose, continue albuterol prn  - control nasal symptoms with flonase    Obesity  - advised weight loss with diet and exercise     Cough    I favor either PND or cough variant asthma as etiology. Stable  - continue nasal ICS      Dyspnea    Based on the information available thus far, I favor a diagnosis of asthma or CAD. -Worse     CLARISSE (Obstructive Sleep Apnea)   - continue CPAP 9 cwp 6-8 hrs at night. - Weight loss and exercise. - Sleep hygiene  - Avoid sedatives, alcohol and caffeinated drinks at bed time. - Avoid driving while sleepy.   - Weight loss is also recommended as a long-term intervention.    - Complications of CLARISSE if not treated were discussed with patient patient, including: systemic hypertension, pulmonary hypertension, cardiovascular morbidities, car accidents and all cause mortality.      Inactive problems     Pulmonary nodules  - 4mm in LLL  - stable for 2 years, no further f/u needed

## 2020-05-26 RX ORDER — LEVOTHYROXINE SODIUM 0.07 MG/1
TABLET ORAL
Qty: 90 TABLET | Refills: 1 | Status: SHIPPED | OUTPATIENT
Start: 2020-05-26 | End: 2020-11-05

## 2020-06-12 ENCOUNTER — TELEPHONE (OUTPATIENT)
Dept: FAMILY MEDICINE CLINIC | Age: 70
End: 2020-06-12

## 2020-06-12 NOTE — TELEPHONE ENCOUNTER
Patient is requesting a RX for diabetic shoes.  Will fax to [phone] Last OV 1-20-20 Next OV 7-20-20

## 2020-06-15 RX ORDER — SPIRONOLACTONE 25 MG/1
25 TABLET ORAL DAILY
Qty: 90 TABLET | Refills: 5 | OUTPATIENT
Start: 2020-06-15

## 2020-06-17 ENCOUNTER — ANTI-COAG VISIT (OUTPATIENT)
Dept: PHARMACY | Age: 70
End: 2020-06-17
Payer: COMMERCIAL

## 2020-06-17 LAB — INTERNATIONAL NORMALIZATION RATIO, POC: 2.4

## 2020-06-17 PROCEDURE — 99211 OFF/OP EST MAY X REQ PHY/QHP: CPT | Performed by: PHARMACIST

## 2020-06-17 PROCEDURE — 85610 PROTHROMBIN TIME: CPT | Performed by: PHARMACIST

## 2020-06-19 ENCOUNTER — TELEPHONE (OUTPATIENT)
Dept: PULMONOLOGY | Age: 70
End: 2020-06-19

## 2020-06-22 ENCOUNTER — VIRTUAL VISIT (OUTPATIENT)
Dept: PULMONOLOGY | Age: 70
End: 2020-06-22
Payer: COMMERCIAL

## 2020-06-22 PROCEDURE — 99442 PR PHYS/QHP TELEPHONE EVALUATION 11-20 MIN: CPT | Performed by: INTERNAL MEDICINE

## 2020-06-22 ASSESSMENT — SLEEP AND FATIGUE QUESTIONNAIRES
HOW LIKELY ARE YOU TO NOD OFF OR FALL ASLEEP WHILE SITTING AND READING: 1
HOW LIKELY ARE YOU TO NOD OFF OR FALL ASLEEP WHILE SITTING QUIETLY AFTER LUNCH WITHOUT ALCOHOL: 0
ESS TOTAL SCORE: 10
HOW LIKELY ARE YOU TO NOD OFF OR FALL ASLEEP WHILE WATCHING TV: 3
HOW LIKELY ARE YOU TO NOD OFF OR FALL ASLEEP IN A CAR, WHILE STOPPED FOR A FEW MINUTES IN TRAFFIC: 0
HOW LIKELY ARE YOU TO NOD OFF OR FALL ASLEEP WHILE SITTING INACTIVE IN A PUBLIC PLACE: 1
HOW LIKELY ARE YOU TO NOD OFF OR FALL ASLEEP WHILE SITTING AND TALKING TO SOMEONE: 0
HOW LIKELY ARE YOU TO NOD OFF OR FALL ASLEEP WHEN YOU ARE A PASSENGER IN A CAR FOR AN HOUR WITHOUT A BREAK: 2
HOW LIKELY ARE YOU TO NOD OFF OR FALL ASLEEP WHILE LYING DOWN TO REST IN THE AFTERNOON WHEN CIRCUMSTANCES PERMIT: 3

## 2020-06-24 RX ORDER — FUROSEMIDE 40 MG/1
40 TABLET ORAL DAILY
Qty: 90 TABLET | Refills: 3 | Status: SHIPPED | OUTPATIENT
Start: 2020-06-24 | End: 2021-05-19

## 2020-07-20 ENCOUNTER — OFFICE VISIT (OUTPATIENT)
Dept: FAMILY MEDICINE CLINIC | Age: 70
End: 2020-07-20
Payer: COMMERCIAL

## 2020-07-20 VITALS
HEART RATE: 84 BPM | TEMPERATURE: 98.4 F | DIASTOLIC BLOOD PRESSURE: 82 MMHG | WEIGHT: 240 LBS | SYSTOLIC BLOOD PRESSURE: 120 MMHG | OXYGEN SATURATION: 95 % | BODY MASS INDEX: 34.44 KG/M2

## 2020-07-20 PROBLEM — S61.412A LACERATION OF LEFT HAND WITHOUT FOREIGN BODY: Status: RESOLVED | Noted: 2019-09-17 | Resolved: 2020-07-20

## 2020-07-20 PROBLEM — E11.42 TYPE 2 DIABETES MELLITUS WITH DIABETIC POLYNEUROPATHY, WITH LONG-TERM CURRENT USE OF INSULIN (HCC): Status: ACTIVE | Noted: 2020-07-20

## 2020-07-20 PROBLEM — Z79.4 TYPE 2 DIABETES MELLITUS WITH DIABETIC POLYNEUROPATHY, WITH LONG-TERM CURRENT USE OF INSULIN (HCC): Status: ACTIVE | Noted: 2020-07-20

## 2020-07-20 PROBLEM — S93.491A SPRAIN OF ANTERIOR TALOFIBULAR LIGAMENT OF RIGHT ANKLE: Status: RESOLVED | Noted: 2019-05-20 | Resolved: 2020-07-20

## 2020-07-20 PROCEDURE — 3017F COLORECTAL CA SCREEN DOC REV: CPT | Performed by: FAMILY MEDICINE

## 2020-07-20 PROCEDURE — 4040F PNEUMOC VAC/ADMIN/RCVD: CPT | Performed by: FAMILY MEDICINE

## 2020-07-20 PROCEDURE — 1123F ACP DISCUSS/DSCN MKR DOCD: CPT | Performed by: FAMILY MEDICINE

## 2020-07-20 PROCEDURE — G8417 CALC BMI ABV UP PARAM F/U: HCPCS | Performed by: FAMILY MEDICINE

## 2020-07-20 PROCEDURE — 2022F DILAT RTA XM EVC RTNOPTHY: CPT | Performed by: FAMILY MEDICINE

## 2020-07-20 PROCEDURE — 99214 OFFICE O/P EST MOD 30 MIN: CPT | Performed by: FAMILY MEDICINE

## 2020-07-20 PROCEDURE — 1036F TOBACCO NON-USER: CPT | Performed by: FAMILY MEDICINE

## 2020-07-20 PROCEDURE — G8427 DOCREV CUR MEDS BY ELIG CLIN: HCPCS | Performed by: FAMILY MEDICINE

## 2020-07-20 PROCEDURE — 3046F HEMOGLOBIN A1C LEVEL >9.0%: CPT | Performed by: FAMILY MEDICINE

## 2020-07-20 RX ORDER — ALBUTEROL SULFATE 90 UG/1
2 AEROSOL, METERED RESPIRATORY (INHALATION) EVERY 6 HOURS PRN
Qty: 3 INHALER | Refills: 1 | Status: SHIPPED | OUTPATIENT
Start: 2020-07-20 | End: 2021-05-25 | Stop reason: SDUPTHER

## 2020-07-20 NOTE — PATIENT INSTRUCTIONS
Please read the healthy family handout that you were given and share it with your family. Please compare this printed medication list with your medications at home to be sure they are the same. If you have any medications that are different please contact us immediately at [phone]. Also review your allergies that we have listed, these may also include medications that you have not been able to tolerate, make sure everything listed is correct. If you have any allergies that are different please contact us immediately at [phone].

## 2020-07-20 NOTE — PROGRESS NOTES
Subjective:   He presents for follow up of his diabetes with neuropathy hypertension atrial fibrillation thyroid disease history of CPAP use for sleep apnea hyperlipidemia and asthma. He saw his lung specialist last month and note was reviewed. Last saw cardiology back in March. He is still using insulin for his diabetes and blood pressures have been under good control. He had all of his lab work last month by his endocrinologist he states. I do not have a copy of it in the chart but will obtain them, his hemoglobin A1c was at 5.9% again so he is very happy with his diabetes control. He is going to a new place to try light therapy and laser therapy for his neuropathy he states it may have helped some they also tried stem cell treatments but that only temporarily helped. He still has significant burning in his feet and numbness. He states the numbness he used to have in his lower legs has improved it is mostly just the feet now he has never had an ulcer but he has had calluses. He does need new set of diabetic shoes which are indicated given his diabetes and significant neuropathy. He is using CPAP as directed. He is still on anticoagulation for his atrial fibrillation    He is allergic to lyrica [pregabalin]; reglan [metoclopramide]; simvastatin; and amlodipine. He   reports that he quit smoking about 30 years ago. His smoking use included cigarettes. He has a 20.00 pack-year smoking history. He has never used smokeless tobacco. Review of systems negative for chest pain, swelling abd pain or rectal bleeding  Objective:   /82   Pulse 84   Temp 98.4 °F (36.9 °C) (Temporal)   Wt 240 lb (108.9 kg)   SpO2 95%   BMI 34.44 kg/m²   BP Readings from Last 3 Encounters:   07/20/20 120/82   03/05/20 108/72   02/11/20 134/80     Physical Exam  Vitals signs reviewed. Constitutional:       Appearance: He is well-developed. He is not toxic-appearing. HENT:      Head: Normocephalic.    Eyes:      General: No scleral icterus. Right eye: No discharge. Left eye: No discharge. Conjunctiva/sclera: Conjunctivae normal.   Neck:      Musculoskeletal: Neck supple. Thyroid: No thyroid mass or thyromegaly. Vascular: No carotid bruit. Cardiovascular:      Rate and Rhythm: Normal rate. Rhythm irregular. Heart sounds: Normal heart sounds. No murmur. Pulmonary:      Effort: No respiratory distress. Breath sounds: Normal breath sounds. No wheezing or rales. Abdominal:      General: There is no distension. Palpations: Abdomen is soft. There is no mass. Tenderness: There is no abdominal tenderness. There is no guarding or rebound. Lymphadenopathy:      Cervical: No cervical adenopathy. Upper Body:      Right upper body: No supraclavicular adenopathy. Skin:     General: Skin is warm. Neurological:      Mental Status: He is alert and oriented to person, place, and time. Psychiatric:         Behavior: Behavior normal.         Thought Content: Thought content normal.         Judgment: Judgment normal.     Diabetic foot exam today he has no ulcerations palpable dorsal pedal pulses he has numbness in both feet on testing  Assessment and Plan:   Diagnosis Orders   1. Type 2 diabetes mellitus with diabetic polyneuropathy, with long-term current use of insulin ([address]     2. Essential hypertension     3. Chronic atrial fibrillation     4. Acquired hypothyroidism     5. CLARISSE on CPAP     6. Mixed hyperlipidemia     7. Moderate persistent asthma without complication  albuterol sulfate HFA (PROAIR HFA) 108 (90 Base) MCG/ACT inhaler   Will order diabetic shoes with diabetic inserts. They are indicated due to his significant diabetic neuropathy noted again on diabetic foot exam today. Need to obtain recent lab work done by Dr. Cong Sanon  The problems listed in the assessment are stable unless otherwise indicated.   He  was instructed to continue their current medications and treatment for the aboveproblems unless otherwise indicated above. Age-specific preventative medicine recommendations were reviewed with patient today and the Healthy Family Handout was given to patient. Avoid tobacco products exposure. Follow up in 6mo. Call or return to office for any problems that develop before the next scheduled follow-up appointment. Zayda Schultz M.D. Parts of this note were completed using voice recognition transcription. Every effort was made to ensure accuracy; however, inadvertent transcription errors may be present.

## 2020-08-05 ENCOUNTER — ANTI-COAG VISIT (OUTPATIENT)
Dept: PHARMACY | Age: 70
End: 2020-08-05
Payer: COMMERCIAL

## 2020-08-05 LAB — INR BLD: 2.5

## 2020-08-05 PROCEDURE — 99211 OFF/OP EST MAY X REQ PHY/QHP: CPT | Performed by: PHARMACIST

## 2020-08-05 PROCEDURE — 85610 PROTHROMBIN TIME: CPT | Performed by: PHARMACIST

## 2020-08-05 NOTE — PROGRESS NOTES
is here for management of anticoagulation for AFib. PMH also significant for DM, Hyopthyroid, GERD, HTN, CAD, COPD. He presents today w/out complaint. Pt verifies dosing regimen as listed above. Pt denies s/s bleeding/bruising/swelling/SOB. No BRBPR. No melena. Reviewed pt medication list.  No changes in OTC/herbal medications. Reviewed dietary concerns. Pt states that he does not pay attention to diet, including for his diabetes. No missed doses. No changes per pt    pt seen in office  97.5 deg F    INR 2.5 is within the acceptable therapeutic range of 2-3. Recommend to continue dose of 10 mg Sunday/Wed/Fri and 7.5 mg every other day. Patient has 5 mg and 2 mg tablets. Will continue to monitor and check INR in 6 weeks (patient normally does 6 weeks)  Dosing reminder card given with phone number, appointment date and time. Return to clinic: 9/16/20 @ [phone] am     Cristina Nathan PharmD Candidate 8/5/20     I have seen the patient and reviewed the progress note written by the PharmD Candidate. I agree with this assessment and plan.    Shanae Bingham PharmD 8/5/2020 10:45 AM

## 2020-08-21 NOTE — TELEPHONE ENCOUNTER
Does she have any symptoms? Was he around the patient during the 48 hours before symptom onset? Does he have any symptoms? It is currently not recommended to use that medication preventatively in outpatient setting because of cardiac risk side effects.

## 2020-08-21 NOTE — TELEPHONE ENCOUNTER
Patient's wife called in and patient is very afraid of the COVID 19. His granddaughter had been visiting him and when she went home she tested positive for COVID. He is wanting the med Plaquenil to take as a precaution from getting the [employer].

## 2020-08-21 NOTE — TELEPHONE ENCOUNTER
Kelly Null does not have any symptoms. He was not with the patient within 48 hrs of onset of symptoms. Annamarie works at the [employer] and he is freaking out due to 2 positives at the [employer] where candy works.

## 2020-09-08 RX ORDER — PANTOPRAZOLE SODIUM 40 MG/1
40 TABLET, DELAYED RELEASE ORAL DAILY
Qty: 90 TABLET | Refills: 3 | Status: SHIPPED | OUTPATIENT
Start: 2020-09-08 | End: 2021-10-21

## 2020-09-14 RX ORDER — PRAVASTATIN SODIUM 20 MG
20 TABLET ORAL DAILY
Qty: 90 TABLET | Refills: 5 | Status: SHIPPED | OUTPATIENT
Start: 2020-09-14 | End: 2021-11-05

## 2020-09-14 NOTE — TELEPHONE ENCOUNTER
Pt last OV 3/5/20 SMM:  Plan:  1. I have encouraged better diet and making sure diabetes is controlled as best possible. 2. Advised to use Lasix as needed for swelling in feet,legs,abdomen weight gain or shortness of breath  3. Meds reviewed and no refills warranted---PCP fills  4. Will need to recheck fasting labs if TG remain elevated I recommend he increase his fenofibrate dose to 154mg and add vascepa 2gm BID if able to afford. Dr. Amarjit Ruiz will recheck labs this summer and would follow prior recs.    5. Follow up 6 months

## 2020-09-16 ENCOUNTER — ANTI-COAG VISIT (OUTPATIENT)
Dept: PHARMACY | Age: 70
End: 2020-09-16
Payer: COMMERCIAL

## 2020-09-16 LAB — INR BLD: 3.6

## 2020-09-16 PROCEDURE — 85610 PROTHROMBIN TIME: CPT

## 2020-09-16 PROCEDURE — 99211 OFF/OP EST MAY X REQ PHY/QHP: CPT

## 2020-09-16 NOTE — PROGRESS NOTES
is here for management of anticoagulation for AFib. PMH also significant for DM, Hyopthyroid, GERD, HTN, CAD, COPD. He presents today w/out complaint. Pt verifies dosing regimen as listed above. Pt denies s/s bleeding/bruising/swelling/SOB. No BRBPR. No melena. Reviewed pt medication list.  No changes in OTC/herbal medications. Reviewed dietary concerns. Pt states that he does not eat a lot of greens. No missed doses. No changes per pt, no complaints of bruising or bleeding. Given patient has been therapeutic since the beginning of the year, will hold dose today and then continue with current dosing schedule. Will see him in 4 weeks instead of his preferred 6 weeks. pt seen in office  97.0 deg F    INR 3.6 is above the acceptable therapeutic range of 2-3. Recommend to hold dose today then to continue dose of 10 mg Sunday/Wed/Fri and 7.5 mg every other day. Patient has 5 mg and 2 mg tablets. Will continue to monitor and check INR in 4 weeks (patient normally does 6 weeks)  Dosing reminder card given with phone number, appointment date and time.   Return to clinic: 10/14/20 @ 1000 am     [id number] [facility]

## 2020-10-14 ENCOUNTER — ANTI-COAG VISIT (OUTPATIENT)
Dept: PHARMACY | Age: 70
End: 2020-10-14
Payer: COMMERCIAL

## 2020-10-14 ENCOUNTER — HOSPITAL ENCOUNTER (OUTPATIENT)
Age: 70
Discharge: HOME | End: 2020-10-14
Payer: COMMERCIAL

## 2020-10-14 LAB
A/G RATIO: 1.4 (ref 1.1–2.2)
ALBUMIN SERPL-MCNC: 3.7 G/DL (ref 3.4–5)
ALP BLD-CCNC: 54 U/L (ref 40–129)
ALT SERPL-CCNC: 16 U/L (ref 10–40)
ANION GAP SERPL CALCULATED.3IONS-SCNC: 9 MMOL/L (ref 3–16)
AST SERPL-CCNC: 15 U/L (ref 15–37)
BASOPHILS ABSOLUTE: 0.1 K/UL (ref 0–0.2)
BASOPHILS RELATIVE PERCENT: 0.7 %
BILIRUB SERPL-MCNC: 0.4 MG/DL (ref 0–1)
BUN BLDV-MCNC: 15 MG/DL (ref 7–20)
CALCIUM SERPL-MCNC: 8.7 MG/DL (ref 8.3–10.6)
CHLORIDE BLD-SCNC: 107 MMOL/L (ref 99–110)
CO2: 22 MMOL/L (ref 21–32)
CREAT SERPL-MCNC: 1.1 MG/DL (ref 0.8–1.3)
EOSINOPHILS ABSOLUTE: 0.2 K/UL (ref 0–0.6)
EOSINOPHILS RELATIVE PERCENT: 1.6 %
GFR AFRICAN AMERICAN: >60
GFR NON-AFRICAN AMERICAN: >60
GLOBULIN: 2.7 G/DL
GLUCOSE BLD-MCNC: 178 MG/DL (ref 70–99)
HCT VFR BLD CALC: 49.4 % (ref 40.5–52.5)
HEMOGLOBIN: 16.4 G/DL (ref 13.5–17.5)
INR BLD: 2.56 (ref 0.86–1.14)
INR BLD: 2.7
LIPASE: 23 U/L (ref 13–60)
LYMPHOCYTES ABSOLUTE: 3.2 K/UL (ref 1–5.1)
LYMPHOCYTES RELATIVE PERCENT: 30.4 %
MCH RBC QN AUTO: 29.9 PG (ref 26–34)
MCHC RBC AUTO-ENTMCNC: 33.2 G/DL (ref 31–36)
MCV RBC AUTO: 90.2 FL (ref 80–100)
MONOCYTES ABSOLUTE: 0.7 K/UL (ref 0–1.3)
MONOCYTES RELATIVE PERCENT: 7 %
NEUTROPHILS ABSOLUTE: 6.4 K/UL (ref 1.7–7.7)
NEUTROPHILS RELATIVE PERCENT: 60.3 %
PDW BLD-RTO: 14.6 % (ref 12.4–15.4)
PLATELET # BLD: 204 K/UL (ref 135–450)
PMV BLD AUTO: 7.5 FL (ref 5–10.5)
POTASSIUM SERPL-SCNC: 4 MMOL/L (ref 3.5–5.1)
PROTHROMBIN TIME: 28.9 SEC (ref 10–13.2)
RBC # BLD: 5.47 M/UL (ref 4.2–5.9)
SODIUM BLD-SCNC: 138 MMOL/L (ref 136–145)
TOTAL PROTEIN: 6.4 G/DL (ref 6.4–8.2)
WBC # BLD: 10.5 K/UL (ref 4–11)

## 2020-10-14 PROCEDURE — 80053 COMPREHEN METABOLIC PANEL: CPT

## 2020-10-14 PROCEDURE — 85610 PROTHROMBIN TIME: CPT

## 2020-10-14 PROCEDURE — 99211 OFF/OP EST MAY X REQ PHY/QHP: CPT | Performed by: PHARMACIST

## 2020-10-14 PROCEDURE — 83690 ASSAY OF LIPASE: CPT

## 2020-10-14 PROCEDURE — 36415 COLL VENOUS BLD VENIPUNCTURE: CPT

## 2020-10-14 PROCEDURE — 85025 COMPLETE CBC W/AUTO DIFF WBC: CPT

## 2020-10-14 PROCEDURE — 85610 PROTHROMBIN TIME: CPT | Performed by: PHARMACIST

## 2020-10-14 NOTE — PROGRESS NOTES
is here for management of anticoagulation for AFib. PMH also significant for DM, Hyopthyroid, GERD, HTN, CAD, COPD. He presents today w/out complaint. Pt verifies dosing regimen as listed above. Pt denies s/s bleeding/bruising/swelling/SOB. No BRBPR. No melena. Reviewed pt medication list.  No changes in OTC/herbal medications. Reviewed dietary concerns. Pt states that he does not eat a lot of greens. No missed doses. Pt has endoscopy scheduled for the 21st and has been instructed to stop taking warfarin for 5 days prior. Was not told to bridge. INR 2.7 is within the acceptable therapeutic range of 2-3. Recommend to continue dose of 10 mg Sunday/Wed/Fri and 7.5 mg every other day. Patient has 5 mg and 2 mg tablets. Will continue to monitor and check INR in 6 weeks. Dosing reminder card given with phone number, appointment date, and time. Return to clinic: 11/25/20 @ 10:00 am     Ramon Cabrera PharmD Candidate 2021    I have seen the patient and reviewed the progress note written by the PharmD Candidate. I agree with this assessment and plan.    Marylou Guillen PharmD 10/14/2020 10:35 AM

## 2020-11-05 RX ORDER — LEVOTHYROXINE SODIUM 0.07 MG/1
TABLET ORAL
Qty: 90 TABLET | Refills: 1 | Status: SHIPPED | OUTPATIENT
Start: 2020-11-05

## 2020-12-09 NOTE — PROGRESS NOTES
[facility]   Cardiac Followup    Referring Provider:  Constantin Crabtree MD     Chief Complaint   Patient presents with    6 Month Follow-Up     Cardiac  follow up for chronic atrial fibrillation, CAD    Other     No new cardiac complaints      Subjective: Patient is being seen today for cardiology follow up for mild non-obstructive CAD,  HTN, and chronic afib; c/o trouble with balance and falls      Past Medical History:    Mr. Nan Gonzalez is a 2o male with PMH mild NOCAD dx 2006 GERD, HTN, CLARISSE (does not use CPAP often), and chronic afib on coumadin. [id number] [address] [facility] manages his PT/INR and adjusts his medications. He underwent  cardiac cath on 8/9/13 which showed mild non-obstructive disease managed medically. No need for PCI. Note SAJI BLE 1/4/2016 showed no evidence of stenosis in BLE. Normal SAJI's ankle-brachial  measured at 1.19 on the right and 1.25 on the left. Most recent Cardiac event monitor 6/20-7/3/17 AFIB showed an average HR of 85 ()  was brief. Carotid US 2/22/18 was negative for blockage. Due to SOB he underwent most recent Lexiscan myoview stress test 3/7/19 LVEF 54%  Apical hypokinesis  Fixed apical defect likely due to scar; No ischemia. Most recent ECHO 3/7/19 showed EF=35-40% with global HK (2012 and 2017 EF=55-60%). Due to worsening LVEF and SOB he underwent most recent [facility] 3/15/19 LM: distal 40% shelf like lesion LAD: ERNESTO-2 sluggish flow, luminals LCX: small, luminals RCA: dominant, luminals LVEDP:15 LVEF: 55% RHC  RA: 11 RV: 30/10  PA:  30/16 and mean of 20 PCWP: 15 Sats: On RA Ao: 94% RA: 61% PA: 62% CO/CI 4.28/1.8. Note EKG 5/1/19  Atrial fibrillation; Left axis deviation; nonspecific ST abnormality  He reports his wife d/c'd Amlodipine started by Dr. Arvind Mckinnon after cath due to swollen feet. Sluggish blood flow in LAD is reason amlodipine was started.   Dr Jerry Chirinos lowered his Cardura from 2mg to 1 mg due to dizziness 8/19/19 and I decreased toprol XL from 100 to 50mg qd. Note he did NOT decrease toprol dose  He only takes lasix PRN even though prescribed daily. History of Present Illness: Today, he reports feeling OK overall but reports continued issues with balance. Vague historian but denies significant dizziness. He reports falling on ground 5 times yesterday. When he bends over to  something he falls over. No cardiac complaints. Continues to c/o memory declining along with balance issues. He has neurologist in [place] who follows him but could not find definite etiology. Past Medical History:   has a past medical history of Arthritis, Asthma, Atrial fibrillation ([address] BPH (benign prostatic hypertrophy), CAD (coronary artery disease), Cervical disc disorder, unspecified, Chronic back pain, COPD, Diabetes mellitus ([address] DM (diabetes mellitus) with complications ([address] Elevated CPK, Erectile dysfunction, GERD (gastroesophageal reflux disease), Hypertension, Hypotestosteronism, Hypothyroidism, Neuropathy, CLARISSE (obstructive sleep apnea), Paroxysmal atrial fibrillation ([address] RBBB (right bundle branch block), and Vitamin D deficiency. Surgical History:   has a past surgical history that includes back surgery; Foot surgery; cardiovascular stress test; Cardiac catheterization (08/01/2006); Carpal tunnel release; Cataract removal with implant (3/9/2011); Cataract removal with implant; Diagnostic Cardiac Cath Lab Procedure (8/9/13); Colonoscopy (2014); Cholecystectomy (6/10/2015); Endoscopy, colon, diagnostic (06/28/2016); Cardiac catheterization (08/09/2013); Cardiac catheterization (03/15/2019); epidural steroid injection (Left, 8/1/2019); and epidural steroid injection (Right, 10/17/2019). Social History:   reports that he quit smoking about 30 years ago. His smoking use included cigarettes. He has a 20.00 pack-year smoking history. He has never used smokeless tobacco. He reports current alcohol use. He reports that he does not use drugs. Family History:  family history includes Diabetes in his father and mother; Emphysema in his paternal grandfather; Heart Failure in his father and mother; Hypertension in his father and mother; Other in his father and mother; Prostate Cancer in his maternal uncle. Home Medications:  Current Outpatient Medications on File Prior to Visit   Medication Sig Dispense Refill    insulin glargine (LANTUS SOLOSTAR) 100 UNIT/ML injection pen Inject 50 units twice a day.  metFORMIN (GLUCOPHAGE) 1000 MG tablet Take 1,000 mg by mouth daily      levothyroxine (SYNTHROID) 75 MCG tablet TAKE 1 TABLET BY MOUTH  DAILY 90 tablet 1    pravastatin (PRAVACHOL) 20 MG tablet TAKE 1 TABLET BY MOUTH  DAILY 90 tablet 5    pantoprazole (PROTONIX) 40 MG tablet TAKE 1 TABLET BY MOUTH  DAILY 90 tablet 3    albuterol sulfate HFA (PROAIR HFA) 108 (90 Base) MCG/ACT inhaler Inhale 2 puffs into the lungs every 6 hours as needed for Wheezing or Shortness of Breath 3 Inhaler 1    TRULICITY 1.5 DU/6.3TG SOPN       metoprolol succinate (TOPROL XL) 100 MG extended release tablet Take 0.5 tablets by mouth daily 90 tablet 3    lisinopril (PRINIVIL;ZESTRIL) 10 MG tablet Take 1 tablet by mouth daily 90 tablet 3    fenofibrate (TRICOR) 54 MG tablet Take 1 tablet by mouth daily 90 tablet 3    warfarin (COUMADIN) 2 MG tablet Take 1 tablet by mouth daily 90 tablet 3    warfarin (COUMADIN) 10 MG tablet Take 1 tablet by mouth daily Every day except Wednesday.  90 tablet 3    warfarin (COUMADIN) 5 MG tablet TAKE 1 TO 2 TABLETS BY  MOUTH DAILY AS DIRECTED 180 tablet 3    doxazosin (CARDURA) 2 MG tablet Take 0.5 tablets by mouth daily (dose reduction) 30 tablet 0    diclofenac sodium 1 % GEL APPLY 4 G TOPICALLY 4 TIMES DAILY 100 g 3    ipratropium-albuterol (DUONEB) 0.5-2.5 (3) MG/3ML SOLN nebulizer solution Inhale 3 mLs into the lungs every 6 hours as needed for Shortness of Breath 360 mL 5    ADVAIR DISKUS 250-50 MCG/DOSE AEPB USE 1 INHALATION TWO TIMES  DAILY 180 each 1    JARDIANCE 25 MG tablet TAKE 1 TABLET EVERY MORNING  2    glucose blood VI test strips (CRUZITO CONTOUR NEXT TEST) strip Testing 4 x a day DX: E11.42 125 each 5    fluticasone (FLONASE) 50 MCG/ACT nasal spray 2 sprays by Nasal route daily 3 Bottle 1    Insulin Pen Needle (NOVOFINE) 30G X 8 MM MISC APPLY 1 EACH TOPICALLY 2 TIMES DAILY. 300 each 2    furosemide (LASIX) 40 MG tablet Take 1 tablet by mouth daily (Patient not taking: Reported on 12/10/2020) 90 tablet 3     No current facility-administered medications on file prior to visit. Allergies:  Lyrica [pregabalin]; Reglan [metoclopramide]; Simvastatin; and Amlodipine     Review of Systems:   · Constitutional: there has been no unanticipated weight loss. There's been no change in energy level, sleep pattern, or activity level. · Eyes: No visual changes or diplopia. No scleral icterus. · ENT: No Headaches, hearing loss or vertigo. No mouth sores or sore throat. · Cardiovascular: Reviewed in HPI  · Respiratory: No cough or wheezing, no sputum production. No hematemesis. · Gastrointestinal: No abdominal pain, appetite loss, blood in stools. No change in bowel or bladder habits. · Genitourinary: No dysuria, trouble voiding, or hematuria. · Musculoskeletal:  No gait disturbance, weakness or joint complaints. · Integumentary: No rash or pruritis. · Neurological: No headache, diplopia, change in muscle strength, numbness or tingling. No change in gait, balance, coordination, mood, affect, memory, mentation, behavior. · Psychiatric: No anxiety, no depression. · Endocrine: No malaise, fatigue or temperature intolerance. No excessive thirst, fluid intake, or urination. No tremor. · Hematologic/Lymphatic: No abnormal bruising or bleeding, blood clots or swollen lymph nodes. · Allergic/Immunologic: No nasal congestion or hives.     Physical Examination:    Vitals:    12/10/20 1326   BP: 132/72   Pulse: 93 3. DM (diabetes mellitus)  : managed per PCP     4. Atrial fibrillation:  Mainstay of treatment is HR control and anticoagulation therapy. [facility] manages his coumadin therapy now (used to be Dr. Lluvia Khan office). I have d/w him changing to NOAC but still wants coumadin at this time. Most recent EKG 5/1/19  Atrial fibrillationLeft axis deviation; Nonspecific ST abnormality. Given balance issues and falls I had discussion about risks/benefits of AC. He wants to continue coumadin and accept bleeding risk. He is more afraid of stroke. I will d/c baby aspirin and keep on coumadin only. 5. CAD (coronary artery disease):  Mild non-obstructive CAD and will continue medical management. Most recent [facility] 3/15/19 LM: distal 40% shelf like lesion LAD: ERNESTO-2 sluggish flow, luminals LCX: small, luminals RCA: dominant, luminals LVEDP:15 LVEF: 55%. There are no concerning symptoms for angina currently. 6.  Hyperlipidemia:  Most recent 6/11/19 I personally reviewed   HDL 23 LDL 52. He had intolerance to Zocor therapy back in Jan 2013 with muscle cramping and CK was noted to 476. He continues Pravastatin 20mg daily. He has been tolerating without diffuse muscle cramping. His TG were 315 and is too high  PCP did not want to change regimen. Needs to watch diet more closely. No labs since 2019 and need to recheck. Plan:  1. I have encouraged better diet and making sure diabetes is controlled as best possible. 2. Will refer to Neuro Dr. Tammi Jay to get 2nd opinion on dizziness and feeling off balance  3. Meds reviewed and no refills warranted---PCP fills  4. Stop aspirin do not need to take with Warfarin especially in light of frequent falls  5. I want him to cut his Metoprolol in 1/2 down to 50 mg per day to see if helps symptoms. 6. Will check lipids, cbc  7. Follow up 9  months    Cost of prescription medications and patient compliance have been reviewed with patient.  All questions answered. Thank you for allowing me to participate in the care of this individual.     This note was scribed in the presence of Linda Mathew MD by Andre Lewis RN    I, Dr. Isidro Fisher, personally performed the services described in this documentation, as scribed by the above signed scribe in my presence. It is both accurate and complete to my knowledge. I agree with the details independently gathered by the clinical support staff, while the remaining scribed note accurately describes my personal service to the patient. Luis Jalloh.  Skyla Pichardo M.D., [facility] [place]

## 2020-12-10 ENCOUNTER — OFFICE VISIT (OUTPATIENT)
Dept: CARDIOLOGY CLINIC | Age: 70
End: 2020-12-10
Payer: COMMERCIAL

## 2020-12-10 VITALS
WEIGHT: 237 LBS | SYSTOLIC BLOOD PRESSURE: 132 MMHG | HEIGHT: 70 IN | OXYGEN SATURATION: 98 % | TEMPERATURE: 97.2 F | HEART RATE: 93 BPM | DIASTOLIC BLOOD PRESSURE: 72 MMHG | BODY MASS INDEX: 33.93 KG/M2

## 2020-12-10 PROBLEM — Z87.891 HISTORY OF TOBACCO ABUSE: Status: ACTIVE | Noted: 2020-12-10

## 2020-12-10 PROCEDURE — 3017F COLORECTAL CA SCREEN DOC REV: CPT | Performed by: INTERNAL MEDICINE

## 2020-12-10 PROCEDURE — G8417 CALC BMI ABV UP PARAM F/U: HCPCS | Performed by: INTERNAL MEDICINE

## 2020-12-10 PROCEDURE — G8427 DOCREV CUR MEDS BY ELIG CLIN: HCPCS | Performed by: INTERNAL MEDICINE

## 2020-12-10 PROCEDURE — G8484 FLU IMMUNIZE NO ADMIN: HCPCS | Performed by: INTERNAL MEDICINE

## 2020-12-10 PROCEDURE — 4040F PNEUMOC VAC/ADMIN/RCVD: CPT | Performed by: INTERNAL MEDICINE

## 2020-12-10 PROCEDURE — 1123F ACP DISCUSS/DSCN MKR DOCD: CPT | Performed by: INTERNAL MEDICINE

## 2020-12-10 PROCEDURE — 1036F TOBACCO NON-USER: CPT | Performed by: INTERNAL MEDICINE

## 2020-12-10 PROCEDURE — 99214 OFFICE O/P EST MOD 30 MIN: CPT | Performed by: INTERNAL MEDICINE

## 2020-12-10 RX ORDER — INSULIN GLARGINE 100 [IU]/ML
50 INJECTION, SOLUTION SUBCUTANEOUS 2 TIMES DAILY
COMMUNITY
Start: 2020-12-09

## 2020-12-10 NOTE — PATIENT INSTRUCTIONS
Plan:  1. I have encouraged better diet and making sure diabetes is controlled as best possible. 2. Will refer to Dr. Lisa Thornton to get 2nd opinion on dizziness and feeling off balance  3. Meds reviewed and no refills warranted---PCP fills  4. Stop aspirin do not need to take with Warfarin especially in light of frequent falls  5. I want him to cut his Metoprolol in 1/2 down to 50 mg per day  6. Will check lipids, cbc  7.  Follow up 9  months

## 2021-01-06 ENCOUNTER — HOSPITAL ENCOUNTER (OUTPATIENT)
Age: 71
Discharge: HOME | End: 2021-01-06
Payer: COMMERCIAL

## 2021-01-06 ENCOUNTER — ANTI-COAG VISIT (OUTPATIENT)
Dept: PHARMACY | Age: 71
End: 2021-01-06
Payer: COMMERCIAL

## 2021-01-06 ENCOUNTER — TELEPHONE (OUTPATIENT)
Dept: CARDIOLOGY CLINIC | Age: 71
End: 2021-01-06

## 2021-01-06 DIAGNOSIS — E78.2 MIXED HYPERLIPIDEMIA: Chronic | ICD-10-CM

## 2021-01-06 DIAGNOSIS — I48.20 CHRONIC ATRIAL FIBRILLATION (HCC): ICD-10-CM

## 2021-01-06 DIAGNOSIS — I48.20 CHRONIC ATRIAL FIBRILLATION (HCC): Chronic | ICD-10-CM

## 2021-01-06 DIAGNOSIS — R29.6 FREQUENT FALLS: ICD-10-CM

## 2021-01-06 DIAGNOSIS — R42 LIGHTHEADEDNESS: ICD-10-CM

## 2021-01-06 LAB
BASOPHILS ABSOLUTE: 0.1 K/UL (ref 0–0.2)
BASOPHILS RELATIVE PERCENT: 1 %
CHOLESTEROL, FASTING: 131 MG/DL (ref 0–199)
EOSINOPHILS ABSOLUTE: 0.2 K/UL (ref 0–0.6)
EOSINOPHILS RELATIVE PERCENT: 1.8 %
HCT VFR BLD CALC: 50.7 % (ref 40.5–52.5)
HDLC SERPL-MCNC: 32 MG/DL (ref 40–60)
HEMOGLOBIN: 16.6 G/DL (ref 13.5–17.5)
INTERNATIONAL NORMALIZATION RATIO, POC: 2.1
LDL CHOLESTEROL CALCULATED: 66 MG/DL
LYMPHOCYTES ABSOLUTE: 3.9 K/UL (ref 1–5.1)
LYMPHOCYTES RELATIVE PERCENT: 33.7 %
MCH RBC QN AUTO: 29.9 PG (ref 26–34)
MCHC RBC AUTO-ENTMCNC: 32.7 G/DL (ref 31–36)
MCV RBC AUTO: 91.3 FL (ref 80–100)
MONOCYTES ABSOLUTE: 0.7 K/UL (ref 0–1.3)
MONOCYTES RELATIVE PERCENT: 6 %
NEUTROPHILS ABSOLUTE: 6.6 K/UL (ref 1.7–7.7)
NEUTROPHILS RELATIVE PERCENT: 57.5 %
PDW BLD-RTO: 14.1 % (ref 12.4–15.4)
PLATELET # BLD: 216 K/UL (ref 135–450)
PMV BLD AUTO: 7.9 FL (ref 5–10.5)
RBC # BLD: 5.56 M/UL (ref 4.2–5.9)
TRIGLYCERIDE, FASTING: 166 MG/DL (ref 0–150)
VLDLC SERPL CALC-MCNC: 33 MG/DL
WBC # BLD: 11.4 K/UL (ref 4–11)

## 2021-01-06 PROCEDURE — 80061 LIPID PANEL: CPT

## 2021-01-06 PROCEDURE — 85610 PROTHROMBIN TIME: CPT | Performed by: PHARMACIST

## 2021-01-06 PROCEDURE — 85025 COMPLETE CBC W/AUTO DIFF WBC: CPT

## 2021-01-06 PROCEDURE — 36415 COLL VENOUS BLD VENIPUNCTURE: CPT

## 2021-01-06 PROCEDURE — 99211 OFF/OP EST MAY X REQ PHY/QHP: CPT | Performed by: PHARMACIST

## 2021-01-06 NOTE — TELEPHONE ENCOUNTER
Created telephone encounter. Per Pt HIPAA from can leave results on machine. Mailbox is full, unable to leave a message at this time. Will call pt at another time to relay lab results.

## 2021-01-06 NOTE — PROGRESS NOTES
is here for management of anticoagulation for AFib. PMH also significant for DM, Hyopthyroid, GERD, HTN, CAD, COPD. He presents today w/out complaint. Pt verifies dosing regimen as listed above. Pt denies s/s bleeding/bruising/swelling/SOB. No BRBPR. No melena. Reviewed pt medication list.  No changes in OTC/herbal medications. Reviewed dietary concerns. Pt states that he does not eat a lot of greens. No missed doses. No changes per pt    INR 2.1 is within the acceptable therapeutic range of 2-3. Recommend to continue dose of 10 mg Sunday/Wed/Fri and 7.5 mg every other day. Patient has 5 mg and 2 mg tablets. Will continue to monitor and check INR in 6 weeks. Dosing reminder card given with phone number, appointment date, and time.   Return to clinic: 2/17/21 @ 10:30 am     Aristides Galvez PharmD 10:48 AM EST 1/6/21

## 2021-01-26 ENCOUNTER — OFFICE VISIT (OUTPATIENT)
Dept: FAMILY MEDICINE CLINIC | Age: 71
End: 2021-01-26
Payer: COMMERCIAL

## 2021-01-26 VITALS
WEIGHT: 239.6 LBS | BODY MASS INDEX: 34.38 KG/M2 | TEMPERATURE: 98.1 F | DIASTOLIC BLOOD PRESSURE: 76 MMHG | OXYGEN SATURATION: 98 % | HEART RATE: 83 BPM | SYSTOLIC BLOOD PRESSURE: 130 MMHG

## 2021-01-26 DIAGNOSIS — E78.2 MIXED HYPERLIPIDEMIA: ICD-10-CM

## 2021-01-26 DIAGNOSIS — E11.42 TYPE 2 DIABETES MELLITUS WITH DIABETIC POLYNEUROPATHY, WITH LONG-TERM CURRENT USE OF INSULIN (HCC): ICD-10-CM

## 2021-01-26 DIAGNOSIS — E03.9 ACQUIRED HYPOTHYROIDISM: Chronic | ICD-10-CM

## 2021-01-26 DIAGNOSIS — I10 ESSENTIAL HYPERTENSION: Primary | ICD-10-CM

## 2021-01-26 DIAGNOSIS — I48.20 CHRONIC ATRIAL FIBRILLATION (HCC): ICD-10-CM

## 2021-01-26 DIAGNOSIS — Z79.4 TYPE 2 DIABETES MELLITUS WITH DIABETIC POLYNEUROPATHY, WITH LONG-TERM CURRENT USE OF INSULIN (HCC): ICD-10-CM

## 2021-01-26 DIAGNOSIS — G47.33 OSA ON CPAP: ICD-10-CM

## 2021-01-26 DIAGNOSIS — Z99.89 OSA ON CPAP: ICD-10-CM

## 2021-01-26 DIAGNOSIS — J45.40 MODERATE PERSISTENT ASTHMA WITHOUT COMPLICATION: ICD-10-CM

## 2021-01-26 PROCEDURE — 2022F DILAT RTA XM EVC RTNOPTHY: CPT | Performed by: FAMILY MEDICINE

## 2021-01-26 PROCEDURE — 3017F COLORECTAL CA SCREEN DOC REV: CPT | Performed by: FAMILY MEDICINE

## 2021-01-26 PROCEDURE — G8417 CALC BMI ABV UP PARAM F/U: HCPCS | Performed by: FAMILY MEDICINE

## 2021-01-26 PROCEDURE — 1036F TOBACCO NON-USER: CPT | Performed by: FAMILY MEDICINE

## 2021-01-26 PROCEDURE — 1123F ACP DISCUSS/DSCN MKR DOCD: CPT | Performed by: FAMILY MEDICINE

## 2021-01-26 PROCEDURE — 4040F PNEUMOC VAC/ADMIN/RCVD: CPT | Performed by: FAMILY MEDICINE

## 2021-01-26 PROCEDURE — 3046F HEMOGLOBIN A1C LEVEL >9.0%: CPT | Performed by: FAMILY MEDICINE

## 2021-01-26 PROCEDURE — G8484 FLU IMMUNIZE NO ADMIN: HCPCS | Performed by: FAMILY MEDICINE

## 2021-01-26 PROCEDURE — 99214 OFFICE O/P EST MOD 30 MIN: CPT | Performed by: FAMILY MEDICINE

## 2021-01-26 PROCEDURE — G8427 DOCREV CUR MEDS BY ELIG CLIN: HCPCS | Performed by: FAMILY MEDICINE

## 2021-01-26 ASSESSMENT — PATIENT HEALTH QUESTIONNAIRE - PHQ9
SUM OF ALL RESPONSES TO PHQ9 QUESTIONS 1 & 2: 0
SUM OF ALL RESPONSES TO PHQ QUESTIONS 1-9: 0

## 2021-01-26 NOTE — PROGRESS NOTES
He presents for follow up of his hypertension thyroid disease diabetes atrial fibrillation hyperlipidemia asthma and sleep apnea. He saw his endocrinologist Dr. Roshan Sarah on 12/2/2020 and he saw his cardiologist on 12/10/2020. Hemoglobin A1c was 6.1% on 12/2/2020  Tests and documents reviewed: Last labs for A1c and lipid panel and note from diabetes specialist from 12/2/20. Colonoscopy 10/26/2020     Objective:   /76   Pulse 83   Temp 98.1 °F (36.7 °C) (Oral)   Wt 239 lb 9.6 oz (108.7 kg)   SpO2 98%   BMI 34.38 kg/m²   BP Readings from Last 3 Encounters:   01/26/21 130/76   12/10/20 132/72   07/20/20 120/82     Physical Exam  Vitals signs reviewed. Constitutional:       Appearance: He is well-developed. He is not toxic-appearing. HENT:      Head: Normocephalic. Neck:      Musculoskeletal: Neck supple. Thyroid: No thyroid mass or thyromegaly. Cardiovascular:      Rate and Rhythm: Normal rate. Rhythm irregular. Heart sounds: Normal heart sounds. No murmur. Pulmonary:      Effort: No respiratory distress. Breath sounds: Normal breath sounds. No wheezing or rales. Abdominal:      General: There is no distension. Palpations: Abdomen is soft. There is no mass. Tenderness: There is no abdominal tenderness. There is no guarding or rebound. Lymphadenopathy:      Cervical: No cervical adenopathy. Upper Body:      Right upper body: No supraclavicular adenopathy. Skin:     General: Skin is warm. Neurological:      Mental Status: He is alert and oriented to person, place, and time. Psychiatric:         Behavior: Behavior normal.         Thought Content: Thought content normal.         Judgment: Judgment normal.       Assessment and Plan:   Diagnosis Orders   1. Essential hypertension     2. Acquired hypothyroidism     3. Type 2 diabetes mellitus with diabetic polyneuropathy, with long-term current use of insulin (HCC)  POCT microalbumin   4.  Chronic atrial fibrillation ([facility] [address]     5. Mixed hyperlipidemia     6. Moderate persistent asthma without complication     7. CLARISSE on CPAP     Diabetes under control with last hemoglobin A1c 6.1%. I reviewed the labs and note from endocrinologist from last month and patient also recently saw cardiology and lipid panel reviewed. He is not due for any blood work at this time. Continue current treatment  The problems listed in the assessment are stable unless otherwise indicated. He  was instructed to continue their current medications and treatment for the above problems unless otherwise indicated above. Age-specific preventative medicine recommendations were reviewed with patient today and the Healthy Family Handout was given to patient. Avoid tobacco products exposure. I have recommended that the patient follow CDC guidelines for prevention of COVID-19 infection. Follow up in 6mo. Call or return to office for any problems that develop before the next scheduled follow-up appointment. Hector Enriquez M.D. Parts of this note were completed using voice recognition transcription. Every effort was made to ensure accuracy; however, inadvertent transcription errors may be present.

## 2021-02-26 RX ORDER — FENOFIBRATE 54 MG/1
54 TABLET ORAL DAILY
Qty: 90 TABLET | Refills: 3 | Status: SHIPPED | OUTPATIENT
Start: 2021-02-26 | End: 2022-09-07 | Stop reason: SDUPTHER

## 2021-02-26 NOTE — TELEPHONE ENCOUNTER
TRISTEN pt. LOV 12/10/20   Plan:  1. I have encouraged better diet and making sure diabetes is controlled as best possible. 2. Will refer to Neuro Dr. Deysi Burrell to get 2nd opinion on dizziness and feeling off balance  3. Meds reviewed and no refills warranted---PCP fills  4. Stop aspirin do not need to take with Warfarin especially in light of frequent falls  5. I want him to cut his Metoprolol in 1/2 down to 50 mg per day to see if helps symptoms. 6. Will check lipids, cbc  7.  Follow up 9  months

## 2021-03-24 ENCOUNTER — ANTI-COAG VISIT (OUTPATIENT)
Dept: PHARMACY | Age: 71
End: 2021-03-24
Payer: COMMERCIAL

## 2021-03-24 DIAGNOSIS — I48.20 CHRONIC ATRIAL FIBRILLATION (HCC): ICD-10-CM

## 2021-03-24 LAB — INR BLD: 2.6

## 2021-03-24 PROCEDURE — 85610 PROTHROMBIN TIME: CPT | Performed by: PHARMACIST

## 2021-03-24 PROCEDURE — 99211 OFF/OP EST MAY X REQ PHY/QHP: CPT | Performed by: PHARMACIST

## 2021-03-24 NOTE — PROGRESS NOTES
is here for management of anticoagulation for AFib. PMH also significant for DM, Hyopthyroid, GERD, HTN, CAD, COPD. He presents today w/out complaint. Pt verifies dosing regimen as listed above. Pt denies s/s bleeding/bruising/swelling/SOB. No BRBPR. No melena. Reviewed pt medication list.  No changes in OTC/herbal medications. Reviewed dietary concerns. Pt states that he does not eat a lot of greens. No missed doses. No changes per pt    INR 2.6 is within the acceptable therapeutic range of 2-3. Recommend to continue dose of 10 mg Sunday/Wed/Fri and 7.5 mg every other day. Patient has 5 mg and 2 mg tablets. Will continue to monitor and check INR in 6 weeks. Dosing reminder card given with phone number, appointment date, and time. Return to clinic: 5/5/21 @ 9:30 am     Francia Alejandro   PharmD Candidate   3/24/2021 9:34 AM    I have seen the patient and reviewed the progress note written by the PharmD Candidate. I agree with this assessment and plan.    Ledy Elias, [place] 3/24/2021 9:38 AM

## 2021-03-29 RX ORDER — LISINOPRIL 10 MG/1
10 TABLET ORAL DAILY
Qty: 90 TABLET | Refills: 3 | Status: SHIPPED | OUTPATIENT
Start: 2021-03-29 | End: 2022-02-24

## 2021-04-01 DIAGNOSIS — I48.20 CHRONIC ATRIAL FIBRILLATION (HCC): Chronic | ICD-10-CM

## 2021-04-01 RX ORDER — WARFARIN SODIUM 5 MG/1
TABLET ORAL
Qty: 180 TABLET | Refills: 1 | Status: SHIPPED | OUTPATIENT
Start: 2021-04-01 | End: 2022-07-26

## 2021-04-01 RX ORDER — WARFARIN SODIUM 10 MG/1
10 TABLET ORAL DAILY
Qty: 90 TABLET | Refills: 1 | Status: SHIPPED | OUTPATIENT
Start: 2021-04-01

## 2021-04-01 RX ORDER — WARFARIN SODIUM 2 MG/1
2 TABLET ORAL DAILY
Qty: 90 TABLET | Refills: 1 | Status: SHIPPED | OUTPATIENT
Start: 2021-04-01 | End: 2022-01-19

## 2021-04-23 RX ORDER — METOPROLOL SUCCINATE 100 MG/1
TABLET, EXTENDED RELEASE ORAL
Qty: 45 TABLET | Refills: 3 | Status: SHIPPED | OUTPATIENT
Start: 2021-04-23 | End: 2021-12-01 | Stop reason: SDUPTHER

## 2021-05-19 RX ORDER — FUROSEMIDE 40 MG/1
40 TABLET ORAL DAILY
Qty: 90 TABLET | Refills: 3 | Status: SHIPPED | OUTPATIENT
Start: 2021-05-19 | End: 2021-12-01

## 2021-05-19 NOTE — TELEPHONE ENCOUNTER
TRISTEN pt. He is OOT   LOV 12/10/20   Plan:  1. I have encouraged better diet and making sure diabetes is controlled as best possible. 2. Will refer to Neuro Dr. Mikal Mayorga to get 2nd opinion on dizziness and feeling off balance  3. Meds reviewed and no refills warranted---PCP fills  4. Stop aspirin do not need to take with Warfarin especially in light of frequent falls  5. I want him to cut his Metoprolol in 1/2 down to 50 mg per day to see if helps symptoms. 6. Will check lipids, cbc  7. Follow up 9  months    I called pt and spoke to him. He does need refills for lasix.

## 2021-05-25 ENCOUNTER — OFFICE VISIT (OUTPATIENT)
Dept: PULMONOLOGY | Age: 71
End: 2021-05-25
Payer: COMMERCIAL

## 2021-05-25 VITALS
OXYGEN SATURATION: 98 % | HEART RATE: 90 BPM | DIASTOLIC BLOOD PRESSURE: 70 MMHG | WEIGHT: 236.2 LBS | SYSTOLIC BLOOD PRESSURE: 120 MMHG | BODY MASS INDEX: 33.82 KG/M2 | HEIGHT: 70 IN | TEMPERATURE: 95.7 F | RESPIRATION RATE: 20 BRPM

## 2021-05-25 DIAGNOSIS — Z99.89 OSA ON CPAP: Primary | Chronic | ICD-10-CM

## 2021-05-25 DIAGNOSIS — G47.33 OSA ON CPAP: Primary | Chronic | ICD-10-CM

## 2021-05-25 DIAGNOSIS — E66.9 OBESITY (BMI 30-39.9): Chronic | ICD-10-CM

## 2021-05-25 DIAGNOSIS — J45.40 MODERATE PERSISTENT ASTHMA WITHOUT COMPLICATION: ICD-10-CM

## 2021-05-25 PROCEDURE — 1036F TOBACCO NON-USER: CPT | Performed by: INTERNAL MEDICINE

## 2021-05-25 PROCEDURE — G8417 CALC BMI ABV UP PARAM F/U: HCPCS | Performed by: INTERNAL MEDICINE

## 2021-05-25 PROCEDURE — 1123F ACP DISCUSS/DSCN MKR DOCD: CPT | Performed by: INTERNAL MEDICINE

## 2021-05-25 PROCEDURE — 99214 OFFICE O/P EST MOD 30 MIN: CPT | Performed by: INTERNAL MEDICINE

## 2021-05-25 PROCEDURE — 4040F PNEUMOC VAC/ADMIN/RCVD: CPT | Performed by: INTERNAL MEDICINE

## 2021-05-25 PROCEDURE — 3017F COLORECTAL CA SCREEN DOC REV: CPT | Performed by: INTERNAL MEDICINE

## 2021-05-25 PROCEDURE — G8427 DOCREV CUR MEDS BY ELIG CLIN: HCPCS | Performed by: INTERNAL MEDICINE

## 2021-05-25 RX ORDER — ALBUTEROL SULFATE 90 UG/1
2 AEROSOL, METERED RESPIRATORY (INHALATION) EVERY 6 HOURS PRN
Qty: 3 INHALER | Refills: 3 | Status: SHIPPED | OUTPATIENT
Start: 2021-05-25 | End: 2022-07-18 | Stop reason: SDUPTHER

## 2021-05-25 NOTE — PROGRESS NOTES
CHIEF COMPLAINT: Dyspnea and cough    Consulting provider: Dr. Jaylan Johnson    HPI:    Presenting Hx: The patient is a 72 yo man with hx of asthma,DM, CAD, CLARISSE previously on CPAP, paroxysmal atrial fibrillation who presents for evaluation of chronic dyspnea and cough. Patient complains of shortness of breath. Symptoms include difficulty breathing, drainage from nose, dyspnea on exertion, post nasal drip and productive cough. Symptoms began 3 years ago, gradually worsening since that time. The dyspnea occurs with minimal activity. Patient denies hemoptysis . Aggravating factors include exertion and dust, pollen, mold. The patient reports an exercise tolerance of approximately 2 blocks on the flat and 2 flights of stairs, limited primarily by dyspnea. He is a former smoker, 20 years x 1ppd, last smoked 4 mos ago. He has been treated with a variety of Inhaled bronchodilators with some response. Currently, he takes advair and albuterol with some improvement but has persistent nasal congestion. Since last clinic visit, the patient has been doing pretty well. He is using his Advair twice daily and albuterol once every 2 to 3 days. He had more trouble in the spring. He has been inconsistent in his use of CPAP because sometimes he falls asleep in his chair and does not use it when he is in the room. Otherwise the machine is working just fine. There are no changes to past medical history, family history, social history or review of systems(except as noted in the history section) since prior note.       Past Medical History:   Diagnosis Date    Arthritis     Asthma     Reactive airway disease    Atrial fibrillation (HCC)     BPH (benign prostatic hypertrophy) 2013    Dr Christiano Cristina    CAD (coronary artery disease)     Minimal (Nonobstructive) Dr Ramirez Born Cervical disc disorder, unspecified     Chronic back pain     COPD     Diabetes mellitus ([facility] [address]     DM (diabetes mellitus) with complications (HCC)     Elevated CPK 2009    Rheum workup negative 2009    Erectile dysfunction     GERD (gastroesophageal reflux disease)     Hypertension     Hypotestosteronism     Hypothyroidism     Neuropathy     Diabetic    CLARISSE (obstructive sleep apnea)     need 11 cm    Paroxysmal atrial fibrillation (HCC)     Dr Megan Garcia RBBB (right bundle branch block)     on EKG    Vitamin D deficiency        Past Surgical History:        Procedure Laterality Date    BACK SURGERY      CARDIAC CATHETERIZATION  08/01/2006    CARDIAC CATHETERIZATION  08/09/2013    CARDIAC CATHETERIZATION  03/15/2019    Non Obs CAD    CARDIOVASCULAR STRESS TEST      CARPAL TUNNEL RELEASE      2008    CATARACT REMOVAL WITH IMPLANT  03/09/2011    CATARACT REMOVAL WITH IMPLANT      CHOLECYSTECTOMY  06/10/2015    Laparoscopic    COLONOSCOPY  2014    COLONOSCOPY  10/26/2020    DIAGNOSTIC CARDIAC CATH LAB PROCEDURE  08/09/2013    ENDOSCOPY, COLON, DIAGNOSTIC  06/28/2016    EGD gastritis    EPIDURAL STEROID INJECTION Left 08/01/2019    LEFT LUMBAR FIVE SACRAL ONE EPIDURAL STEROID INJECTION SITE CONFIRMED BY FLUOROSCOPY performed by Elmer Maradiaga MD at [facility] Right 10/17/2019    RIGHT CERVICAL SIX SEVEN EPIDURAL STEROID INJECTION SITE CONFIRMED BY FLUOROSCOPY performed by Elmer Maradiaga MD at [address] [place] [address]         Allergies:  is allergic to lyrica [pregabalin], reglan [metoclopramide], simvastatin, and amlodipine. Social History:    TOBACCO:   reports that he quit smoking about 31 years ago. His smoking use included cigarettes. He has a 20.00 pack-year smoking history. He has never used smokeless tobacco.  ETOH:   reports current alcohol use. 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     Family History:       Problem Relation Age of Onset    Other Mother         CAD    Diabetes Mother     Heart Failure Mother  Hypertension Mother     Other Father         CAD    Diabetes Father     Heart Failure Father     Hypertension Father     Prostate Cancer Maternal Uncle     Emphysema Paternal Grandfather     Asthma Neg Hx     Cancer Neg Hx        Current Medications:    Current Outpatient Medications:     metoprolol succinate (TOPROL XL) 100 MG extended release tablet, TAKE ONE-HALF TABLET BY  MOUTH DAILY, Disp: 45 tablet, Rfl: 3    warfarin (COUMADIN) 2 MG tablet, Take 1 tablet by mouth daily, Disp: 90 tablet, Rfl: 1    warfarin (COUMADIN) 10 MG tablet, Take 1 tablet by mouth daily Every day except Wednesday. , Disp: 90 tablet, Rfl: 1    warfarin (COUMADIN) 5 MG tablet, TAKE 1 TO 2 TABLETS BY  MOUTH DAILY AS DIRECTED, Disp: 180 tablet, Rfl: 1    lisinopril (PRINIVIL;ZESTRIL) 10 MG tablet, TAKE 1 TABLET BY MOUTH  DAILY, Disp: 90 tablet, Rfl: 3    fenofibrate (TRICOR) 54 MG tablet, TAKE 1 TABLET BY MOUTH  DAILY, Disp: 90 tablet, Rfl: 3    insulin glargine (LANTUS SOLOSTAR) 100 UNIT/ML injection pen, Inject 50 units twice a day., Disp: , Rfl:     levothyroxine (SYNTHROID) 75 MCG tablet, TAKE 1 TABLET BY MOUTH  DAILY, Disp: 90 tablet, Rfl: 1    pravastatin (PRAVACHOL) 20 MG tablet, TAKE 1 TABLET BY MOUTH  DAILY, Disp: 90 tablet, Rfl: 5    pantoprazole (PROTONIX) 40 MG tablet, TAKE 1 TABLET BY MOUTH  DAILY, Disp: 90 tablet, Rfl: 3    albuterol sulfate HFA (PROAIR HFA) 108 (90 Base) MCG/ACT inhaler, Inhale 2 puffs into the lungs every 6 hours as needed for Wheezing or Shortness of Breath, Disp: 3 Inhaler, Rfl: 1    TRULICITY 1.5 QX/9.7YB SOPN, , Disp: , Rfl:     doxazosin (CARDURA) 2 MG tablet, Take 0.5 tablets by mouth daily (dose reduction), Disp: 30 tablet, Rfl: 0    ipratropium-albuterol (DUONEB) 0.5-2.5 (3) MG/3ML SOLN nebulizer solution, Inhale 3 mLs into the lungs every 6 hours as needed for Shortness of Breath, Disp: 360 mL, Rfl: 5    ADVAIR DISKUS 250-50 MCG/DOSE AEPB, USE 1 INHALATION TWO TIMES  DAILY, Disp: 180 each, Rfl: 1    JARDIANCE 25 MG tablet, TAKE 1 TABLET EVERY MORNING, Disp: , Rfl: 2    fluticasone (FLONASE) 50 MCG/ACT nasal spray, 2 sprays by Nasal route daily, Disp: 3 Bottle, Rfl: 1    furosemide (LASIX) 40 MG tablet, TAKE 1 TABLET BY MOUTH  DAILY (Patient not taking: Reported on 5/25/2021), Disp: 90 tablet, Rfl: 3    metFORMIN (GLUCOPHAGE) 1000 MG tablet, Take 1,000 mg by mouth daily (Patient not taking: Reported on 5/25/2021), Disp: , Rfl:     diclofenac sodium 1 % GEL, APPLY 4 G TOPICALLY 4 TIMES DAILY (Patient not taking: Reported on 5/25/2021), Disp: 100 g, Rfl: 3    glucose blood VI test strips (Cylance CONTOUR NEXT TEST) strip, Testing 4 x a day DX: E11.42, Disp: 125 each, Rfl: 5    Insulin Pen Needle (NOVOFINE) 30G X 8 MM MISC, APPLY 1 EACH TOPICALLY 2 TIMES DAILY. , Disp: 300 each, Rfl: 2      Objective:   PHYSICAL EXAM:        VITALS:    /70   Pulse 90   Temp 95.7 °F (35.4 °C)   Resp 20   Ht 5' 10\" (1.778 m)   Wt 236 lb 3.2 oz (107.1 kg)   SpO2 98% Comment: on RA  BMI 33.89 kg/m²     Constitutional: In no acute distress. Appears well. Obese. Normocephalic, atraumatic. Eyes: PERRL. No sclera icterus. No conjunctival injection. ENT: No ocular or auricular discharge or visible masses. Oropharynx clear. Neck: Trachea midline. Normal thyroid. Resp: No accessory muscle use. No crackles. No wheezes. No rhonchi. No dullness on percussion. CV: Regular rate. Irregular rhythm. No murmur or rub. Normal S1 and S2. No edema  GI: Abdomen non-tender. Non-distended. No masses. Skin: Warm and dry. No nodules on exposed extremities. No rash on exposed extremities. M/S: No cyanosis. No synovitis or joint deformity. No clubbing. Neuro: Cranial nerves are grossly intact. Moving all extremities. Motor and sensation grossly intact. Psych: Oriented x 3. No anxiety or agitation.        DATA:      LABS:        PFTs 10/9/12  FVC 4.34 (91%) FEV1 3.40 (94%) FEV1/FVC ratio 78%  TLC 5.92 (82%) DLCO 25.9 (82%) no bd response  6MWT 1400 feet, low sat 94%        IMAGING:      I personally reviewed and interpreted the following in the office :       Chest Ct 10/27/14:   Comparison with 10/28/2013 and 10/9/2012. Several subcentimeter noncalcified nodules in both lungs are unchanged. There are no new nodules. There is no evidence of adenopathy. The pleural and pericardial spaces are clear. The upper abdomen is unremarkable. Chest CT 10/28/13: There is no mediastinal, hilar, or axillary adenopathy. There are no pleural effusions. There has been no interval change in a noncalcified nodule within the lateral left lower lobe. This nodule measures approximately 5 mm on today's exam. Slight differences in size are felt to be related to volume averaging rather than true interval growth. There are subpleural nodules within the lateral aspect of the right middle lobe and within the medial aspect of the left upper lobe measuring 2 to 3 mm in size each. These are unchanged. No new or enlarging pulmonary nodule is seen. No focal airspace consolidation is identified. There is fatty infiltration of the liver. Visualized portions of the adrenal glands and upper abdomen appear within normal limits. Bone window images demonstrate postoperative changes of the right shoulder. Chest CT 10/9/12: The airways are patent. No pleural fluid is present. The lungs are well expanded. No acute airspace disease. There is a 4 mm nodule in the lateral basilar segment of the left lower lobe. A partially calcified nodule is observed in the anterior segment of the right upper lobe. There are calcified mediastinal and right hilar lymph nodes. Soft tissues at the base of the neck are normal. Heart and mediastinum show no other significant findings. The esophagus tapers normally. No adrenal mass or nodule. The visualized abdominal and skeletal structures show no significant findings.  There is a remote fracture to the posterior left eleventh rib. CXR (dated 9/9/12): mild hyperinflation, normal cardiomediastinal silhouette; left basilar retrocardiac streaky opacity      ASSESSMENT AND PLAN:  Asthma moderate persistent   Per hx. change in weather is a trigger.    PFTs did not show obstruction  - continue Advair, 250, continue albuterol prn- refill today  - controlled nasal symptoms with flonase       Obesity  - advised weight loss with diet and exercise  - stable        CLARISSE (Obstructive Sleep Apnea)   - continue CPAP 9 cwp 6-8 hrs at night. Encourage nightly use   - Weight loss and exercise. - Sleep hygiene  - Avoid sedatives, alcohol and caffeinated drinks at bed time. - Avoid driving while sleepy. - Weight loss is also recommended as a long-term intervention.    - Complications of CLARISSE if not treated were discussed with patient patient, including: systemic hypertension, pulmonary hypertension, cardiovascular morbidities, car accidents and all cause mortality.

## 2021-07-21 ENCOUNTER — ANTI-COAG VISIT (OUTPATIENT)
Dept: PHARMACY | Age: 71
End: 2021-07-21
Payer: COMMERCIAL

## 2021-07-21 DIAGNOSIS — I48.20 CHRONIC ATRIAL FIBRILLATION (HCC): Primary | ICD-10-CM

## 2021-07-21 LAB — INR BLD: 2.9

## 2021-07-21 PROCEDURE — 85610 PROTHROMBIN TIME: CPT | Performed by: PHARMACIST

## 2021-07-21 PROCEDURE — 99211 OFF/OP EST MAY X REQ PHY/QHP: CPT | Performed by: PHARMACIST

## 2021-07-21 NOTE — PROGRESS NOTES
is here for management of anticoagulation for AFib. PMH also significant for DM, Hyopthyroid, GERD, HTN, CAD, COPD. He presents today w/out complaint. Pt verifies dosing regimen as listed above. Pt denies s/s bleeding/bruising/swelling/SOB. No BRBPR. No melena. Reviewed pt medication list.  No changes in OTC/herbal medications. Reviewed dietary concerns. Pt states that he does not eat a lot of greens. No missed doses. No changes per pt    INR 2.9 is within the acceptable therapeutic range of 2-3. Recommend to continue dose of 10 mg Sunday/Wed/Fri and 7.5 mg every other day. Patient has 5 mg and 2 mg tablets. Will continue to monitor and check INR in 6 weeks. Dosing reminder card given with phone number, appointment date, and time. Return to clinic: 9/1/21 @ 10:00 am     [facility]  PharmD Candidate     I have seen the patient and reviewed the progress note written by the PharmD Candidate. I agree with this assessment and plan.    VERENA Fraser [facility], PharmD 7/21/2021 10:27 AM

## 2021-08-02 ENCOUNTER — HOSPITAL ENCOUNTER (OUTPATIENT)
Age: 71
Discharge: HOME | End: 2021-08-02
Payer: COMMERCIAL

## 2021-08-02 LAB
A/G RATIO: 1.5 (ref 1.1–2.2)
ALBUMIN SERPL-MCNC: 4 G/DL (ref 3.4–5)
ALP BLD-CCNC: 70 U/L (ref 40–129)
ALT SERPL-CCNC: 20 U/L (ref 10–40)
ANION GAP SERPL CALCULATED.3IONS-SCNC: 11 MMOL/L (ref 3–16)
AST SERPL-CCNC: 25 U/L (ref 15–37)
BILIRUB SERPL-MCNC: 0.7 MG/DL (ref 0–1)
BUN BLDV-MCNC: 15 MG/DL (ref 7–20)
CALCIUM SERPL-MCNC: 9.5 MG/DL (ref 8.3–10.6)
CHLORIDE BLD-SCNC: 105 MMOL/L (ref 99–110)
CHOLESTEROL, TOTAL: 131 MG/DL (ref 0–199)
CO2: 23 MMOL/L (ref 21–32)
CREAT SERPL-MCNC: 1 MG/DL (ref 0.8–1.3)
CREATININE URINE: 87.5 MG/DL (ref 39–259)
GFR AFRICAN AMERICAN: >60
GFR NON-AFRICAN AMERICAN: >60
GLOBULIN: 2.6 G/DL
GLUCOSE BLD-MCNC: 136 MG/DL (ref 70–99)
HDLC SERPL-MCNC: 30 MG/DL (ref 40–60)
LDL CHOLESTEROL CALCULATED: 67 MG/DL
MICROALBUMIN UR-MCNC: <1.2 MG/DL
MICROALBUMIN/CREAT UR-RTO: NORMAL MG/G (ref 0–30)
POTASSIUM SERPL-SCNC: 4.2 MMOL/L (ref 3.5–5.1)
SODIUM BLD-SCNC: 139 MMOL/L (ref 136–145)
T4 FREE: 1.1 NG/DL (ref 0.9–1.8)
TOTAL PROTEIN: 6.6 G/DL (ref 6.4–8.2)
TRIGL SERPL-MCNC: 171 MG/DL (ref 0–150)
TSH SERPL DL<=0.05 MIU/L-ACNC: 1.66 UIU/ML (ref 0.27–4.2)
VLDLC SERPL CALC-MCNC: 34 MG/DL

## 2021-08-02 PROCEDURE — 80053 COMPREHEN METABOLIC PANEL: CPT

## 2021-08-02 PROCEDURE — 82607 VITAMIN B-12: CPT

## 2021-08-02 PROCEDURE — 83036 HEMOGLOBIN GLYCOSYLATED A1C: CPT

## 2021-08-02 PROCEDURE — 82570 ASSAY OF URINE CREATININE: CPT

## 2021-08-02 PROCEDURE — 82043 UR ALBUMIN QUANTITATIVE: CPT

## 2021-08-02 PROCEDURE — 84439 ASSAY OF FREE THYROXINE: CPT

## 2021-08-02 PROCEDURE — 36415 COLL VENOUS BLD VENIPUNCTURE: CPT

## 2021-08-02 PROCEDURE — 80061 LIPID PANEL: CPT

## 2021-08-02 PROCEDURE — 84443 ASSAY THYROID STIM HORMONE: CPT

## 2021-08-03 LAB
ESTIMATED AVERAGE GLUCOSE: 137 MG/DL
HBA1C MFR BLD: 6.4 %
VITAMIN B-12: 1805 PG/ML (ref 211–911)

## 2021-08-11 ENCOUNTER — TELEPHONE (OUTPATIENT)
Dept: FAMILY MEDICINE CLINIC | Age: 71
End: 2021-08-11

## 2021-08-11 NOTE — TELEPHONE ENCOUNTER
----- Message from Gael Parikh sent at 8/11/2021  3:55 PM EDT -----  Subject: Message to Provider    QUESTIONS  Information for Provider? Patient would like a refill on his muscle spasms   medication.   ---------------------------------------------------------------------------  --------------  CALL BACK INFO  What is the best way for the office to contact you? OK to leave message on   voicemail  Preferred Call Back Phone Number? [phone]  ---------------------------------------------------------------------------  --------------  SCRIPT ANSWERS  Relationship to Patient? Other  Representative Name? Annamarie Bloom  Is the Representative on the appropriate HIPAA document in Epic?  Yes

## 2021-08-11 NOTE — TELEPHONE ENCOUNTER
Left message for patient to call   Need to clarify what medicine is being requested and what pharmacy [facility]

## 2021-08-12 DIAGNOSIS — M62.838 MUSCLE SPASM: Primary | ICD-10-CM

## 2021-08-12 RX ORDER — CYCLOBENZAPRINE HCL 5 MG
5 TABLET ORAL 3 TIMES DAILY PRN
Qty: 30 TABLET | Refills: 0 | Status: SHIPPED | OUTPATIENT
Start: 2021-08-12 | End: 2021-08-22

## 2021-10-20 ENCOUNTER — ANTI-COAG VISIT (OUTPATIENT)
Dept: PHARMACY | Age: 71
End: 2021-10-20
Payer: COMMERCIAL

## 2021-10-20 DIAGNOSIS — I48.20 CHRONIC ATRIAL FIBRILLATION (HCC): Primary | ICD-10-CM

## 2021-10-20 LAB — INTERNATIONAL NORMALIZATION RATIO, POC: 2.8

## 2021-10-20 PROCEDURE — 85610 PROTHROMBIN TIME: CPT | Performed by: PHARMACIST

## 2021-10-20 PROCEDURE — 99211 OFF/OP EST MAY X REQ PHY/QHP: CPT | Performed by: PHARMACIST

## 2021-10-20 NOTE — PROGRESS NOTES
is here for management of anticoagulation for AFib. PMH also significant for DM, Hyopthyroid, GERD, HTN, CAD, COPD. He presents today w/out complaint. Pt verifies dosing regimen as listed above. Pt denies s/s bleeding/bruising/swelling/SOB. No BRBPR. No melena. Reviewed pt medication list.  No changes in OTC/herbal medications. Reviewed dietary concerns. Pt states that he does not eat a lot of greens. No missed doses. No changes per pt    INR 2.8 is within the acceptable therapeutic range of 2-3. Recommend to continue dose of 10 mg Sunday/Wed/Fri and 7.5 mg every other day. Patient has 5 mg and 2 mg tablets. Will continue to monitor and check INR in 6 weeks. Dosing reminder card given with phone number, appointment date, and time.   Return to clinic: 12/1/21 @ 11:00 am     Fauzia Cruz PharmD 9:50 AM EDT 10/20/21

## 2021-10-21 RX ORDER — PANTOPRAZOLE SODIUM 40 MG/1
40 TABLET, DELAYED RELEASE ORAL DAILY
Qty: 90 TABLET | Refills: 3 | Status: SHIPPED | OUTPATIENT
Start: 2021-10-21 | End: 2022-09-23

## 2021-11-04 ENCOUNTER — OFFICE VISIT (OUTPATIENT)
Dept: FAMILY MEDICINE CLINIC | Age: 71
End: 2021-11-04
Payer: COMMERCIAL

## 2021-11-04 VITALS
DIASTOLIC BLOOD PRESSURE: 75 MMHG | SYSTOLIC BLOOD PRESSURE: 129 MMHG | OXYGEN SATURATION: 96 % | TEMPERATURE: 98.5 F | WEIGHT: 230.13 LBS | HEART RATE: 99 BPM | BODY MASS INDEX: 33.02 KG/M2

## 2021-11-04 DIAGNOSIS — J45.41 MODERATE PERSISTENT ASTHMA WITH ACUTE EXACERBATION: ICD-10-CM

## 2021-11-04 DIAGNOSIS — J44.1 COPD EXACERBATION (HCC): Primary | ICD-10-CM

## 2021-11-04 PROCEDURE — 1036F TOBACCO NON-USER: CPT | Performed by: NURSE PRACTITIONER

## 2021-11-04 PROCEDURE — G8484 FLU IMMUNIZE NO ADMIN: HCPCS | Performed by: NURSE PRACTITIONER

## 2021-11-04 PROCEDURE — 3023F SPIROM DOC REV: CPT | Performed by: NURSE PRACTITIONER

## 2021-11-04 PROCEDURE — 3017F COLORECTAL CA SCREEN DOC REV: CPT | Performed by: NURSE PRACTITIONER

## 2021-11-04 PROCEDURE — G8926 SPIRO NO PERF OR DOC: HCPCS | Performed by: NURSE PRACTITIONER

## 2021-11-04 PROCEDURE — G8427 DOCREV CUR MEDS BY ELIG CLIN: HCPCS | Performed by: NURSE PRACTITIONER

## 2021-11-04 PROCEDURE — 4040F PNEUMOC VAC/ADMIN/RCVD: CPT | Performed by: NURSE PRACTITIONER

## 2021-11-04 PROCEDURE — G8417 CALC BMI ABV UP PARAM F/U: HCPCS | Performed by: NURSE PRACTITIONER

## 2021-11-04 PROCEDURE — 1123F ACP DISCUSS/DSCN MKR DOCD: CPT | Performed by: NURSE PRACTITIONER

## 2021-11-04 PROCEDURE — 99213 OFFICE O/P EST LOW 20 MIN: CPT | Performed by: NURSE PRACTITIONER

## 2021-11-04 RX ORDER — PREDNISONE 10 MG/1
TABLET ORAL
Qty: 32 TABLET | Refills: 0 | Status: SHIPPED | OUTPATIENT
Start: 2021-11-04 | End: 2021-11-14

## 2021-11-04 RX ORDER — AZITHROMYCIN 250 MG/1
250 TABLET, FILM COATED ORAL SEE ADMIN INSTRUCTIONS
Qty: 6 TABLET | Refills: 0 | Status: SHIPPED | OUTPATIENT
Start: 2021-11-04 | End: 2021-11-09

## 2021-11-04 RX ORDER — BENZONATATE 100 MG/1
100 CAPSULE ORAL 3 TIMES DAILY PRN
Qty: 30 CAPSULE | Refills: 0 | Status: SHIPPED | OUTPATIENT
Start: 2021-11-04 | End: 2021-11-11

## 2021-11-04 RX ORDER — IPRATROPIUM BROMIDE AND ALBUTEROL SULFATE 2.5; .5 MG/3ML; MG/3ML
1 SOLUTION RESPIRATORY (INHALATION) EVERY 6 HOURS PRN
Qty: 360 ML | Refills: 5 | Status: SHIPPED | OUTPATIENT
Start: 2021-11-04

## 2021-11-04 ASSESSMENT — ENCOUNTER SYMPTOMS
COUGH: 1
EYES NEGATIVE: 1
SHORTNESS OF BREATH: 1
CHEST TIGHTNESS: 1
GASTROINTESTINAL NEGATIVE: 1

## 2021-11-04 NOTE — PROGRESS NOTES
TUNNEL RELEASE          CATARACT REMOVAL WITH IMPLANT  2011    CATARACT REMOVAL WITH IMPLANT      CHOLECYSTECTOMY  06/10/2015    Laparoscopic    COLONOSCOPY  2014    COLONOSCOPY  10/26/2020    DIAGNOSTIC CARDIAC CATH LAB PROCEDURE  2013    ENDOSCOPY, COLON, DIAGNOSTIC  2016    EGD gastritis    EPIDURAL STEROID INJECTION Left 2019    LEFT LUMBAR FIVE SACRAL ONE EPIDURAL STEROID INJECTION SITE CONFIRMED BY FLUOROSCOPY performed by Yoan Castellanos MD at [facility] Right 10/17/2019    RIGHT CERVICAL SIX SEVEN EPIDURAL STEROID INJECTION SITE CONFIRMED BY FLUOROSCOPY performed by Yoan Castellanos MD at [address] [place] [address]       Family History   Problem Relation Age of Onset    Other Mother         CAD    Diabetes Mother     Heart Failure Mother     Hypertension Mother     Other Father         CAD    Diabetes Father     Heart Failure Father     Hypertension Father     Prostate Cancer Maternal Uncle     Emphysema Paternal Grandfather     Asthma Neg Hx     Cancer Neg Hx      Social History     Socioeconomic History    Marital status:      Spouse name: Not on file    Number of children: Not on file    Years of education: Not on file    Highest education level: Not on file   Occupational History    Not on file   Tobacco Use    Smoking status: Former Smoker     Packs/day: 1.00     Years: 20.00     Pack years: 20.00     Types: Cigarettes     Quit date: 1990     Years since quittin.8    Smokeless tobacco: Never Used   Vaping Use    Vaping Use: Never used   Substance and Sexual Activity    Alcohol use: Yes     Comment: occas    Drug use: Never    Sexual activity: Yes     Partners: Female   Other Topics Concern    Not on file   Social History Narrative    Not on file     Social Determinants of Health     Financial Resource Strain:     Difficulty of Paying Living Expenses:    Food Insecurity:     Worried About Running Out of Food in the Last Year:     Juan of Food in the Last Year:    Transportation Needs:     Lack of Transportation (Medical):      Lack of Transportation (Non-Medical):    Physical Activity:     Days of Exercise per Week:     Minutes of Exercise per Session:    Stress:     Feeling of Stress :    Social Connections:     Frequency of Communication with Friends and Family:     Frequency of Social Gatherings with Friends and Family:     Attends Oriental orthodox Services:     Active Member of Clubs or Organizations:     Attends Club or Organization Meetings:     Marital Status:    Intimate Partner Violence:     Fear of Current or Ex-Partner:     Emotionally Abused:     Physically Abused:     Sexually Abused:      Current Outpatient Medications   Medication Sig Dispense Refill    benzonatate (TESSALON) 100 MG capsule Take 1 capsule by mouth 3 times daily as needed for Cough 30 capsule 0    azithromycin (ZITHROMAX) 250 MG tablet Take 1 tablet by mouth See Admin Instructions for 5 days 500mg on day 1 followed by 250mg on days 2 - 5 6 tablet 0    predniSONE (DELTASONE) 10 MG tablet 3 tabs BID for 2 days then 2 BID for 2 days the 3 a day for 2days then 2 a day for 2 days then 1 a day for 2d 32 tablet 0    ipratropium-albuterol (DUONEB) 0.5-2.5 (3) MG/3ML SOLN nebulizer solution Inhale 3 mLs into the lungs every 6 hours as needed for Shortness of Breath 360 mL 5    pantoprazole (PROTONIX) 40 MG tablet TAKE 1 TABLET BY MOUTH  DAILY 90 tablet 3    fluticasone-salmeterol (ADVAIR DISKUS) 250-50 MCG/DOSE AEPB USE 1 INHALATION TWO TIMES  DAILY 180 each 3    albuterol sulfate HFA (PROAIR HFA) 108 (90 Base) MCG/ACT inhaler Inhale 2 puffs into the lungs every 6 hours as needed for Wheezing or Shortness of Breath 3 Inhaler 3    furosemide (LASIX) 40 MG tablet TAKE 1 TABLET BY MOUTH  DAILY 90 tablet 3    metoprolol succinate (TOPROL XL) 100 MG extended release tablet TAKE ONE-HALF TABLET BY  MOUTH DAILY 45 tablet 3    warfarin (COUMADIN) 2 MG tablet Take 1 tablet by mouth daily 90 tablet 1    warfarin (COUMADIN) 10 MG tablet Take 1 tablet by mouth daily Every day except Wednesday. 90 tablet 1    warfarin (COUMADIN) 5 MG tablet TAKE 1 TO 2 TABLETS BY  MOUTH DAILY AS DIRECTED 180 tablet 1    lisinopril (PRINIVIL;ZESTRIL) 10 MG tablet TAKE 1 TABLET BY MOUTH  DAILY 90 tablet 3    fenofibrate (TRICOR) 54 MG tablet TAKE 1 TABLET BY MOUTH  DAILY 90 tablet 3    insulin glargine (LANTUS SOLOSTAR) 100 UNIT/ML injection pen Inject 50 units twice a day.  metFORMIN (GLUCOPHAGE) 1000 MG tablet Take 1,000 mg by mouth daily       levothyroxine (SYNTHROID) 75 MCG tablet TAKE 1 TABLET BY MOUTH  DAILY 90 tablet 1    pravastatin (PRAVACHOL) 20 MG tablet TAKE 1 TABLET BY MOUTH  DAILY 90 tablet 5    TRULICITY 1.5 ET/1.6MP SOPN       doxazosin (CARDURA) 2 MG tablet Take 0.5 tablets by mouth daily (dose reduction) 30 tablet 0    diclofenac sodium 1 % GEL APPLY 4 G TOPICALLY 4 TIMES DAILY 100 g 3    JARDIANCE 25 MG tablet TAKE 1 TABLET EVERY MORNING  2    glucose blood VI test strips (CRUZITO CONTOUR NEXT TEST) strip Testing 4 x a day DX: E11.42 125 each 5    fluticasone (FLONASE) 50 MCG/ACT nasal spray 2 sprays by Nasal route daily 3 Bottle 1    Insulin Pen Needle (NOVOFINE) 30G X 8 MM MISC APPLY 1 EACH TOPICALLY 2 TIMES DAILY. 300 each 2     No current facility-administered medications for this visit. Allergies   Allergen Reactions    Lyrica [Pregabalin] Swelling    Reglan [Metoclopramide]      tremor    Simvastatin      Increased CPK    Amlodipine Other (See Comments)     Feet edema       REVIEW OF SYSTEMS  Review of Systems   Constitutional: Negative. HENT: Negative. Eyes: Negative. Respiratory: Positive for cough, chest tightness and shortness of breath. Cardiovascular: Negative. Gastrointestinal: Negative. Genitourinary: Negative. Musculoskeletal: Negative. Skin: Negative. Neurological: Negative. Psychiatric/Behavioral: Negative. PHYSICAL EXAM  /75   Pulse 99   Temp 98.5 °F (36.9 °C) (Oral)   Wt 230 lb 2 oz (104.4 kg)   SpO2 96%   BMI 33.02 kg/m²   Physical Exam  Constitutional:       Appearance: He is not toxic-appearing. HENT:      Head: Normocephalic. Right Ear: Tympanic membrane normal.      Left Ear: Tympanic membrane normal.      Nose: Nose normal.      Mouth/Throat:      Mouth: Mucous membranes are moist.      Pharynx: Oropharynx is clear. Eyes:      Extraocular Movements: Extraocular movements intact. Conjunctiva/sclera: Conjunctivae normal.      Pupils: Pupils are equal, round, and reactive to light. Cardiovascular:      Rate and Rhythm: Normal rate. Pulses: Normal pulses. Pulmonary:      Effort: Pulmonary effort is normal.      Breath sounds: Normal breath sounds. Abdominal:      Palpations: Abdomen is soft. Tenderness: There is no guarding. Musculoskeletal:         General: Normal range of motion. Cervical back: Normal range of motion. Skin:     General: Skin is warm and dry. Capillary Refill: Capillary refill takes less than 2 seconds. Neurological:      Mental Status: He is alert and oriented to person, place, and time. Psychiatric:         Mood and Affect: Mood normal.         Behavior: Behavior normal.        ASSESSMENT/PLAN:   1. COPD exacerbation ([address]  Patient presents to the office today with plaints of nonproductive cough over 1 week. Patient reports that he has had some shortness of breath worse upon coughing episodes and exertion. Patient has known history of COPD and moderate persistent asthma. Patient reports that he does get similar symptoms in the fall every year. No fever, chills, nausea, vomiting or diarrhea. No chest pain. Patient reports eating and drinking appropriately. Patient reports performing home Covid test yesterday which was negative.   Patient reports receiving both doses of Covid vaccine and booster. Patient reports using albuterol nebulizers but feels that they may be . Patient also uses inhalers as prescribed. Patiently placed on a course of cough suppressant, antibiotics and steroids at this time. Refill sent on nebulizer treatments. I did recommend that patient continue using them as well as prescribed inhalers. Drinking plenty of fluids. Following up for any new or worsening symptoms. Patient has home oxygen monitor and I did recommend that him check it on a regular basis and call the office if lower than 90%. Verbalized and acknowledges with plan of care at this time. - benzonatate (TESSALON) 100 MG capsule; Take 1 capsule by mouth 3 times daily as needed for Cough  Dispense: 30 capsule; Refill: 0  - azithromycin (ZITHROMAX) 250 MG tablet; Take 1 tablet by mouth See Admin Instructions for 5 days 500mg on day 1 followed by 250mg on days 2 - 5  Dispense: 6 tablet; Refill: 0  - predniSONE (DELTASONE) 10 MG tablet; 3 tabs BID for 2 days then 2 BID for 2 days the 3 a day for 2days then 2 a day for 2 days then 1 a day for 2d  Dispense: 32 tablet; Refill: 0  - ipratropium-albuterol (DUONEB) 0.5-2.5 (3) MG/3ML SOLN nebulizer solution; Inhale 3 mLs into the lungs every 6 hours as needed for Shortness of Breath  Dispense: 360 mL; Refill: 5    2. Moderate persistent asthma with acute exacerbation  See above #1  - predniSONE (DELTASONE) 10 MG tablet; 3 tabs BID for 2 days then 2 BID for 2 days the 3 a day for 2days then 2 a day for 2 days then 1 a day for 2d  Dispense: 32 tablet; Refill: 0  - ipratropium-albuterol (DUONEB) 0.5-2.5 (3) MG/3ML SOLN nebulizer solution; Inhale 3 mLs into the lungs every 6 hours as needed for Shortness of Breath  Dispense: 360 mL; Refill: 5         The note was completed using Dragon voice recognition transcription.  Every effort was made to ensure accuracy; however, inadvertent  transcription errors may be present despite my best efforts to edit errors.     Kosta Zamudio, APRN - CNP

## 2021-11-04 NOTE — PATIENT INSTRUCTIONS
Please read the healthy family handout that you were given and share it with your family. Please compare this printed medication list with your medications at home to be sure they are the same. If you have any medications that are different please contact us immediately at [phone]. Also review your allergies that we have listed, these may also include medications that you have not been able to tolerate, make sure everything listed is correct. If you have any allergies that are different please contact us immediately at [phone]. Patient Education        COPD Exacerbation Plan: Care Instructions  Your Care Instructions     If you have chronic obstructive pulmonary disease (COPD), your usual shortness of breath could suddenly get worse. You may start coughing more and have more mucus. This flare-up is called a COPD exacerbation (say \"dc-HXI-le-BAY-norman\"). A lung infection or air pollution could set off an exacerbation. Sometimes it can happen after a quick change in temperature or being around chemicals. Work with your doctor to make a plan for dealing with an exacerbation. You can better manage it if you plan ahead. Follow-up care is a key part of your treatment and safety. Be sure to make and go to all appointments, and call your doctor if you are having problems. It's also a good idea to know your test results and keep a list of the medicines you take. How can you care for yourself at home? During an exacerbation  · Do not panic if you start to have one. Quick treatment at home may help you prevent serious breathing problems. If you have a COPD exacerbation plan that you developed with your doctor, follow it. · Take your medicines exactly as your doctor tells you.  ? Use your inhaler as directed by your doctor. If your symptoms do not get better after you use your medicine, have someone take you to the emergency room. Call an ambulance if necessary. ?  With inhaled medicines, a spacer or a nebulizer may help you get more medicine to your lungs. Ask your doctor or pharmacist how to use them properly. Practice using the spacer in front of a mirror before you have an exacerbation. This may help you get the medicine into your lungs quickly. ? If your doctor has given you steroid pills, take them as directed. ? Your doctor may have given you a prescription for antibiotics, which you can fill if you need it. ? Talk to your doctor if you have any problems with your medicine. And call your doctor if you have to use your antibiotic or steroid pills. Preventing an exacerbation  · Do not smoke. This is the most important step you can take to prevent more damage to your lungs and prevent problems. If you already smoke, it is never too late to stop. If you need help quitting, talk to your doctor about stop-smoking programs and medicines. These can increase your chances of quitting for good. · Take your daily medicines as prescribed. · Avoid colds and flu. ? Get a pneumococcal vaccine. ? Get a flu vaccine each year, as soon as it is available. Ask those you live or work with to do the same, so they will not get the flu and infect you. ? Try to stay away from people with colds or the flu. ? Wash your hands often. · Avoid secondhand smoke; air pollution; cold, dry air; hot, humid air; and high altitudes. Stay at home with your windows closed when air pollution is bad. · Learn breathing techniques for COPD, such as breathing through pursed lips. These techniques can help you breathe easier during an exacerbation. When should you call for help? Call 911 anytime you think you may need emergency care. For example, call if:    · You have severe trouble breathing.     · You have severe chest pain.    Call your doctor now or seek immediate medical care if:    · You have new or worse shortness of breath.     · You develop new chest pain.     · You are coughing more deeply or more often, especially if you notice more mucus or a change in the color of your mucus.     · You cough up blood.     · You have new or increased swelling in your legs or belly.     · You have a fever. Watch closely for changes in your health, and be sure to contact your doctor if:    · You need to use your antibiotic or steroid pills.     · Your symptoms are getting worse. Where can you learn more? Go to https://[employer].CM Sistemi. org and sign in to your [email or web address] account. Enter N686 in the [facility] box to learn more about \"COPD Exacerbation Plan: Care Instructions. \"     If you do not have an account, please click on the \"Sign Up Now\" link. Current as of: July 6, 2021               Content Version: 13.0  © 2006-2021 Healthwise, [employer]. Care instructions adapted under license by [facility] ([facility]). If you have questions about a medical condition or this instruction, always ask your healthcare professional. [place] [address] any warranty or liability for your use of this information. Patient Education        COPD and Asthma: Care Instructions  Overview     Some people who have chronic obstructive pulmonary disease (COPD) may also have asthma. With both of these conditions, air doesn't flow easily in and out of your lungs. This can make it hard to breathe. You may be short of breath, wheeze, cough, or have mucus in your airways. When you have COPD and asthma, it's important to follow your treatment plan. There are two parts to your treatment:  · Controlling COPD and asthma over the long term. · Treating attacks or flare-ups when they occur. You and your doctor can make a plan that will help. This plan tells you the medicines you take to manage your symptoms and prevent attacks or flare-ups. It also tells you what to do if you have an attack or flare-up. Follow-up care is a key part of your treatment and safety.  Be sure to make and go to all appointments, and call your doctor if you are having problems. It's also a good idea to know your test results and keep a list of the medicines you take. How can you care for yourself at home? To control COPD and asthma  · Do not smoke. Smoking can make COPD and asthma worse. If you need help quitting, talk to your doctor about stop-smoking programs and medicines. These can increase your chances of quitting for good. · Learn what sets off your COPD and asthma. Avoid these triggers when you can. Common triggers include smoke, pollen, pollution, and infections like colds, the flu, or pneumonia. · Check yourself for symptoms to know which step to follow in your action plan. Watch for things like being short of breath, having chest tightness, and coughing more than usual. Look for a change in the color or thickness of your mucus. Also notice if symptoms wake you up at night or if you get tired quickly when you exercise. · Check your lungs with a peak flow meter, if your doctor recommends it. Peak flow can tell you how well your lungs are working. · Try pulmonary rehabilitation, if your doctor prescribes it. This combines different treatments to help you reduce your symptoms and stay as active and healthy as you can. Medicines   · Call your doctor if you think you are having a problem with your medicine. COPD and asthma are treated with medicine to help you breathe easier. You may take:  ? Controller medicines. These prevent attacks and flare-ups before they happen. They are taken regularly. There are different types. ? Quick-relief medicine. This is for times when you can't prevent symptoms and need to treat them fast. It can quickly relax the airways and allow you to breathe easier. · Learn how to use your inhalers the right way. Ask your doctor or pharmacist for help. · Use oxygen if your doctor recommends it. Oxygen therapy boosts the amount of oxygen in your blood and helps you breathe easier. Use the flow rate your doctor recommends.  Do not change it without talking to your doctor first.  Preventing infections   · Wash your hands often. · Avoid contact with people who have colds and other respiratory infections. · Get a flu vaccine every year. Ask your doctor about getting the pneumococcal and whooping cough (pertussis) shots. To treat attacks when they occur  · Follow your action plan. It can tell you what to do when you have an attack or flare-up. · Take your quick-relief medicine exactly as prescribed. Talk with your doctor if you have any problems with your medicine. ? Keep this medicine with you at all times. ? You may need to use this medicine before you exercise to prevent an attack. · If your doctor prescribed corticosteroid pills or other medicines to use during an attack or flare-up, take them as directed. They may shorten the attack and help you breathe easier. When should you call for help? Call 911 anytime you think you may need emergency care. For example, call if:    · You have severe trouble breathing.     · You are having chest pain that is different or worse than usual.   Call your doctor now or seek immediate medical care if:    · You have new or worse shortness of breath.     · You cough up blood.     · You have a fever.     · You have used your quick-relief medicine but you are still short of breath.     · You have feelings of anxiety or depression. Watch closely for changes in your health, and be sure to contact your doctor if:    · You are coughing more deeply or more often, or you notice more mucus or a change in the color of your mucus.     · You have new or worse swelling in your legs or belly.     · You are not getting better as expected. Where can you learn more? Go to https://Fashioholicinderjit.Baidu. org and sign in to your Exploredge account. Enter A350 in the [employer] box to learn more about \"COPD and Asthma: Care Instructions. \"     If you do not have an account, please click on the \"Sign Up Now\" link.  Current as of: July 6, 2021               Content Version: 13.0  © 3707-8206 [employer]. Care instructions adapted under license by [facility] ([facility]). If you have questions about a medical condition or this instruction, always ask your healthcare professional. [address] any warranty or liability for your use of this information.

## 2021-11-05 RX ORDER — PRAVASTATIN SODIUM 20 MG
20 TABLET ORAL DAILY
Qty: 90 TABLET | Refills: 3 | Status: SHIPPED | OUTPATIENT
Start: 2021-11-05 | End: 2022-10-10

## 2021-11-10 ENCOUNTER — OFFICE VISIT (OUTPATIENT)
Dept: FAMILY MEDICINE CLINIC | Age: 71
End: 2021-11-10
Payer: COMMERCIAL

## 2021-11-10 VITALS
HEART RATE: 103 BPM | HEIGHT: 70 IN | BODY MASS INDEX: 33.04 KG/M2 | SYSTOLIC BLOOD PRESSURE: 117 MMHG | WEIGHT: 230.8 LBS | RESPIRATION RATE: 18 BRPM | OXYGEN SATURATION: 94 % | DIASTOLIC BLOOD PRESSURE: 77 MMHG

## 2021-11-10 DIAGNOSIS — G63 POLYNEUROPATHY ASSOCIATED WITH UNDERLYING DISEASE (HCC): ICD-10-CM

## 2021-11-10 DIAGNOSIS — Z99.89 OSA ON CPAP: Chronic | ICD-10-CM

## 2021-11-10 DIAGNOSIS — I10 ESSENTIAL HYPERTENSION: Primary | Chronic | ICD-10-CM

## 2021-11-10 DIAGNOSIS — I48.20 CHRONIC ATRIAL FIBRILLATION (HCC): Chronic | ICD-10-CM

## 2021-11-10 DIAGNOSIS — Z79.4 TYPE 2 DIABETES MELLITUS WITH DIABETIC POLYNEUROPATHY, WITH LONG-TERM CURRENT USE OF INSULIN (HCC): ICD-10-CM

## 2021-11-10 DIAGNOSIS — E78.2 MIXED HYPERLIPIDEMIA: Chronic | ICD-10-CM

## 2021-11-10 DIAGNOSIS — E03.9 ACQUIRED HYPOTHYROIDISM: Chronic | ICD-10-CM

## 2021-11-10 DIAGNOSIS — G47.33 OSA ON CPAP: Chronic | ICD-10-CM

## 2021-11-10 DIAGNOSIS — Z23 NEEDS FLU SHOT: ICD-10-CM

## 2021-11-10 DIAGNOSIS — E11.42 TYPE 2 DIABETES MELLITUS WITH DIABETIC POLYNEUROPATHY, WITH LONG-TERM CURRENT USE OF INSULIN (HCC): ICD-10-CM

## 2021-11-10 PROBLEM — J44.1 COPD EXACERBATION (HCC): Status: RESOLVED | Noted: 2021-11-04 | Resolved: 2021-11-10

## 2021-11-10 PROCEDURE — G8417 CALC BMI ABV UP PARAM F/U: HCPCS | Performed by: FAMILY MEDICINE

## 2021-11-10 PROCEDURE — G8484 FLU IMMUNIZE NO ADMIN: HCPCS | Performed by: FAMILY MEDICINE

## 2021-11-10 PROCEDURE — 1123F ACP DISCUSS/DSCN MKR DOCD: CPT | Performed by: FAMILY MEDICINE

## 2021-11-10 PROCEDURE — 3017F COLORECTAL CA SCREEN DOC REV: CPT | Performed by: FAMILY MEDICINE

## 2021-11-10 PROCEDURE — 4040F PNEUMOC VAC/ADMIN/RCVD: CPT | Performed by: FAMILY MEDICINE

## 2021-11-10 PROCEDURE — 3044F HG A1C LEVEL LT 7.0%: CPT | Performed by: FAMILY MEDICINE

## 2021-11-10 PROCEDURE — G8427 DOCREV CUR MEDS BY ELIG CLIN: HCPCS | Performed by: FAMILY MEDICINE

## 2021-11-10 PROCEDURE — 1036F TOBACCO NON-USER: CPT | Performed by: FAMILY MEDICINE

## 2021-11-10 PROCEDURE — 2022F DILAT RTA XM EVC RTNOPTHY: CPT | Performed by: FAMILY MEDICINE

## 2021-11-10 PROCEDURE — 99214 OFFICE O/P EST MOD 30 MIN: CPT | Performed by: FAMILY MEDICINE

## 2021-11-10 NOTE — PROGRESS NOTES
He presents for follow up of his multiple health problems. He is getting over a bronchitis infection he was on steroids which made his sugars go up and Z-Jamie he states he is improving he was able to go outside and do some work yesterday but he still coughing some. He has been sitting up in his chair at night to sleep and study using his CPAP machine since he has been sick lately. He still goes to endocrinologist and heart specialist his blood sugars have been under good control until recent steroids he states. His blood work from August was reviewed today and all in a decent range  Tests and documents reviewed: Last visit note     Objective:   /77 (Site: Left Upper Arm)   Pulse 103   Resp 18   Ht 5' 10\" (1.778 m)   Wt 230 lb 12.8 oz (104.7 kg)   SpO2 94%   BMI 33.12 kg/m²   BP Readings from Last 3 Encounters:   11/10/21 117/77   11/04/21 129/75   05/25/21 120/70     Physical Exam  Vitals reviewed. Constitutional:       Appearance: He is well-developed. He is not toxic-appearing. HENT:      Head: Normocephalic. Neck:      Thyroid: No thyroid mass or thyromegaly. Cardiovascular:      Rate and Rhythm: Normal rate and regular rhythm. Heart sounds: Normal heart sounds. No murmur heard. Pulmonary:      Effort: No respiratory distress. Breath sounds: Normal breath sounds. No wheezing or rales. Chest:   Breasts:      Right: No supraclavicular adenopathy. Abdominal:      General: There is no distension. Palpations: Abdomen is soft. There is no mass. Tenderness: There is no abdominal tenderness. There is no guarding or rebound. Musculoskeletal:      Cervical back: Neck supple. Lymphadenopathy:      Cervical: No cervical adenopathy. Upper Body:      Right upper body: No supraclavicular adenopathy. Skin:     General: Skin is warm. Neurological:      Mental Status: He is alert and oriented to person, place, and time.    Psychiatric:         Behavior: Behavior normal.         Thought Content: Thought content normal.         Judgment: Judgment normal.       Assessment and Plan:   Diagnosis Orders   1. Essential hypertension     2. Acquired hypothyroidism     3. Type 2 diabetes mellitus with diabetic polyneuropathy, with long-term current use of insulin ([facility]     4. Polyneuropathy associated with underlying disease ([facility]     5. Chronic atrial fibrillation (HCC)     6. CLARISSE on CPAP     7. Mixed hyperlipidemia     8. Needs flu shot     I recommended a flu shot but he will wait for 1 to 2 weeks until over current respiratory infection before getting the flu shot. Otherwise continue to follow-up with endocrinology and cardiology  The problems listed in the assessment are stable unless otherwise indicated. He  was instructed to continue their current medications and treatment for the above problems unless otherwise indicated above. Age-specific preventative medicine recommendations were reviewed with patient today and the Healthy Family Handout was given to patient. Avoid tobacco products exposure. I have recommended that the patient follow CDC guidelines for prevention of COVID-19 infection. Follow up in 6mo. Call or return to office for any problems that develop before the next scheduled follow-up appointment. Elmer Menezes M.D. Parts of this note were completed using voice recognition transcription. Every effort was made to ensure accuracy; however, inadvertent transcription errors may be present.

## 2021-11-24 ENCOUNTER — NURSE ONLY (OUTPATIENT)
Dept: FAMILY MEDICINE CLINIC | Age: 71
End: 2021-11-24
Payer: MEDICARE

## 2021-11-24 DIAGNOSIS — Z23 NEED FOR INFLUENZA VACCINATION: ICD-10-CM

## 2021-11-24 DIAGNOSIS — Z23 NEED FOR INFLUENZA VACCINATION: Primary | ICD-10-CM

## 2021-11-24 PROCEDURE — 90694 VACC AIIV4 NO PRSRV 0.5ML IM: CPT | Performed by: FAMILY MEDICINE

## 2021-11-24 PROCEDURE — G0008 ADMIN INFLUENZA VIRUS VAC: HCPCS | Performed by: FAMILY MEDICINE

## 2021-11-24 NOTE — PROGRESS NOTES
[id number] Information Sheet, \"Influenza - Inactivated\"  given to Lena Yee, or parent/legal guardian of  Lena Yee and verbalized understanding. Patient responses:    Have you ever had a reaction to a flu vaccine? No  Do you have any current illness? No  Have you ever had Guillian Portales Syndrome? No  Do you have a serious allergy to any of the follow: Neomycin, Polymyxin, Thimerosal, eggs or egg products? No    Flu vaccine given per order. Please see immunization tab. Risks and benefits explained. Current VIS given.

## 2021-12-01 ENCOUNTER — ANTI-COAG VISIT (OUTPATIENT)
Dept: PHARMACY | Age: 71
End: 2021-12-01
Payer: COMMERCIAL

## 2021-12-01 ENCOUNTER — OFFICE VISIT (OUTPATIENT)
Dept: CARDIOLOGY CLINIC | Age: 71
End: 2021-12-01
Payer: COMMERCIAL

## 2021-12-01 VITALS
WEIGHT: 231 LBS | BODY MASS INDEX: 33.07 KG/M2 | OXYGEN SATURATION: 98 % | HEIGHT: 70 IN | DIASTOLIC BLOOD PRESSURE: 60 MMHG | HEART RATE: 84 BPM | SYSTOLIC BLOOD PRESSURE: 106 MMHG

## 2021-12-01 DIAGNOSIS — G47.33 OSA ON CPAP: Chronic | ICD-10-CM

## 2021-12-01 DIAGNOSIS — E11.42 TYPE 2 DIABETES MELLITUS WITH DIABETIC POLYNEUROPATHY, WITH LONG-TERM CURRENT USE OF INSULIN (HCC): ICD-10-CM

## 2021-12-01 DIAGNOSIS — I50.9 CONGESTIVE HEART FAILURE, UNSPECIFIED HF CHRONICITY, UNSPECIFIED HEART FAILURE TYPE (HCC): ICD-10-CM

## 2021-12-01 DIAGNOSIS — Z87.891 HISTORY OF TOBACCO ABUSE: ICD-10-CM

## 2021-12-01 DIAGNOSIS — Z79.4 TYPE 2 DIABETES MELLITUS WITH DIABETIC POLYNEUROPATHY, WITH LONG-TERM CURRENT USE OF INSULIN (HCC): ICD-10-CM

## 2021-12-01 DIAGNOSIS — Z98.890 S/P CARDIAC CATH: Chronic | ICD-10-CM

## 2021-12-01 DIAGNOSIS — I10 ESSENTIAL HYPERTENSION: Chronic | ICD-10-CM

## 2021-12-01 DIAGNOSIS — Z99.89 OSA ON CPAP: Chronic | ICD-10-CM

## 2021-12-01 DIAGNOSIS — I48.20 CHRONIC ATRIAL FIBRILLATION (HCC): Primary | ICD-10-CM

## 2021-12-01 DIAGNOSIS — E78.2 MIXED HYPERLIPIDEMIA: Chronic | ICD-10-CM

## 2021-12-01 DIAGNOSIS — K21.9 GASTROESOPHAGEAL REFLUX DISEASE WITHOUT ESOPHAGITIS: Chronic | ICD-10-CM

## 2021-12-01 LAB — INR BLD: 3.9

## 2021-12-01 PROCEDURE — 93000 ELECTROCARDIOGRAM COMPLETE: CPT | Performed by: INTERNAL MEDICINE

## 2021-12-01 PROCEDURE — 99211 OFF/OP EST MAY X REQ PHY/QHP: CPT | Performed by: PHARMACIST

## 2021-12-01 PROCEDURE — 1123F ACP DISCUSS/DSCN MKR DOCD: CPT | Performed by: INTERNAL MEDICINE

## 2021-12-01 PROCEDURE — 85610 PROTHROMBIN TIME: CPT | Performed by: PHARMACIST

## 2021-12-01 PROCEDURE — 99214 OFFICE O/P EST MOD 30 MIN: CPT | Performed by: INTERNAL MEDICINE

## 2021-12-01 PROCEDURE — 4040F PNEUMOC VAC/ADMIN/RCVD: CPT | Performed by: INTERNAL MEDICINE

## 2021-12-01 PROCEDURE — 3044F HG A1C LEVEL LT 7.0%: CPT | Performed by: INTERNAL MEDICINE

## 2021-12-01 PROCEDURE — G8484 FLU IMMUNIZE NO ADMIN: HCPCS | Performed by: INTERNAL MEDICINE

## 2021-12-01 PROCEDURE — 1036F TOBACCO NON-USER: CPT | Performed by: INTERNAL MEDICINE

## 2021-12-01 PROCEDURE — 2022F DILAT RTA XM EVC RTNOPTHY: CPT | Performed by: INTERNAL MEDICINE

## 2021-12-01 PROCEDURE — 3017F COLORECTAL CA SCREEN DOC REV: CPT | Performed by: INTERNAL MEDICINE

## 2021-12-01 PROCEDURE — G8417 CALC BMI ABV UP PARAM F/U: HCPCS | Performed by: INTERNAL MEDICINE

## 2021-12-01 PROCEDURE — G8427 DOCREV CUR MEDS BY ELIG CLIN: HCPCS | Performed by: INTERNAL MEDICINE

## 2021-12-01 RX ORDER — FUROSEMIDE 40 MG/1
40 TABLET ORAL PRN
Qty: 90 TABLET | Refills: 1 | Status: SHIPPED | OUTPATIENT
Start: 2021-12-01 | End: 2022-06-06 | Stop reason: ALTCHOICE

## 2021-12-01 RX ORDER — METOPROLOL SUCCINATE 50 MG/1
TABLET, EXTENDED RELEASE ORAL
Qty: 90 TABLET | Refills: 3 | Status: SHIPPED | OUTPATIENT
Start: 2021-12-01 | End: 2022-10-10

## 2021-12-01 NOTE — PROGRESS NOTES
[facility]   Cardiac Followup    Referring Provider:  Henok Llanos MD     Chief Complaint   Patient presents with    1 Year Follow Up      Subjective: Patient is being seen today for cardiology follow up for mild non-obstructive CAD,  HTN, and chronic afib; no complaints today    Past Medical History:    Mr. Tobin Adame is a 71yo male with PMH mild NOCAD dx 2006 GERD, HTN, CLARISSE (does not use CPAP often), and chronic afib on coumadin. [id number] [employer] manages his PT/INR, He underwent  cardiac cath on 8/9/13 showing mild NOCAD. No need for PCI. Note [facility] BLE 1/4/2016 showed no evidence of stenosis in BLE. Normal [facility]'s ankle-brachial  measured at 1.19 on the right and 1.25 on the left. Most recent Cardiac event monitor 6/20-7/3/17 AFIB showed an average HR of 85 ()  was brief. Carotid US 2/22/18 was negative for blockage. Due to SOB he underwent most recent Lexiscan myoview stress test 3/7/19 LVEF 54%  Apical hypokinesis  Fixed apical defect likely due to scar; No ischemia. Most recent ECHO 3/7/19 EF=35-40% with global HK (2012 and 2017 EF=55-60%). Due to worsening LVEF/SOB underwent most recent [facility] 3/15/19 LM: distal 40% shelf like lesion LAD: ERNESTO-2 sluggish flow, luminals LCX: small, luminals RCA: dominant, luminals LVEDP:15 LVEF: 55% RHC  RA: 11 RV: 30/10  PA:  30/16 and mean of 20 PCWP: 15 Sats: On RA Ao: 94% RA: 61% PA: 62% CO/CI 4.28/1.8. Note EKG 5/1/19  Atrial fibrillation; Left axis deviation; nonspecific ST abnormality. I decreased toprol XL from 100 to 50mg qd. History of Present Illness: At last OV I d/c'd baby aspirin due to frequent falls and already on aspirin. I referred him to neurology for falls but he did not follow through. Most recent EKG today AF 80bpm; IRBB, left axis deviation--no change from 2019 EKG. He states he is feeling better. Reports falling and balance issues have improved.  Patient denies current edema, chest pain, sob, palpitations, dizziness or syncope. Patient is taking all cardiac medications as prescribed and tolerates them well. He takes lasix 40mg PRN only    Past Medical History:   has a past medical history of Arthritis, Asthma, Atrial fibrillation ([address] BPH (benign prostatic hypertrophy), CAD (coronary artery disease), Cervical disc disorder, unspecified, Chronic back pain, COPD, Diabetes mellitus ([address] DM (diabetes mellitus) with complications ([address] Elevated CPK, Erectile dysfunction, GERD (gastroesophageal reflux disease), Hypertension, Hypotestosteronism, Hypothyroidism, Neuropathy, CLARISSE (obstructive sleep apnea), Paroxysmal atrial fibrillation ([address] RBBB (right bundle branch block), and Vitamin D deficiency. Surgical History:   has a past surgical history that includes back surgery; Foot surgery; cardiovascular stress test; Cardiac catheterization (08/01/2006); Carpal tunnel release; Cataract removal with implant (03/09/2011); Cataract removal with implant; Diagnostic Cardiac Cath Lab Procedure (08/09/2013); Colonoscopy (2014); Cholecystectomy (06/10/2015); Endoscopy, colon, diagnostic (06/28/2016); Cardiac catheterization (08/09/2013); Cardiac catheterization (03/15/2019); epidural steroid injection (Left, 08/01/2019); epidural steroid injection (Right, 10/17/2019); and Colonoscopy (10/26/2020). Social History:   reports that he quit smoking about 1990. His smoking use included cigarettes. He has a 20.00 pack-year smoking history. He has never used smokeless tobacco. He reports current alcohol use. He reports that he does not use drugs. Family History:  family history includes Diabetes in his father and mother; Emphysema in his paternal grandfather; Heart Failure in his father and mother; Hypertension in his father and mother; Other in his father and mother; Prostate Cancer in his maternal uncle.      Home Medications:  Current Outpatient Medications on File Prior to Visit   Medication Sig Dispense Refill    displaced  · Heart tones are crisp and normal  · Cervical veins are not engorged  · The carotid upstroke is normal in amplitude and contour without delay or bruit  · Irregularly irregular, No S3, No Murmur  · Peripheral pulses are symmetrical and full  · There is no clubbing, cyanosis of the extremities. · no edema  · Femoral Arteries: 2+ and equal  · Pedal Pulses: 2+ and equal   Abdomen:  · No masses or tenderness  · Liver/Spleen: No Abnormalities Noted  Neurological/Psychiatric:  · Alert and oriented in all spheres  · Moves all extremities well  · Exhibits normal gait balance and coordination  · No abnormalities of mood, affect, memory, mentation, or behavior are noted  ·     Lab Results   Component Value Date    CHOL 131 08/02/2021    CHOL 145 06/19/2017    CHOL 145 06/07/2017     Lab Results   Component Value Date    TRIG 171 (H) 08/02/2021    TRIG 528 (H) 06/19/2017    TRIG 225 (H) 06/07/2017     Lab Results   Component Value Date    HDL 30 (L) 08/02/2021    HDL 32 (L) 01/06/2021    HDL 23 (L) 06/11/2019     Lab Results   Component Value Date    LDLCALC 90 12/08/2016    LDLCALC 82 12/08/2015    LDLCALC 65 06/08/2015     Lab Results   Component Value Date    LABVLDL see below 12/08/2016    LABVLDL 48 12/08/2015    LABVLDL 49 06/08/2015     Last labs 12/02/20:   Na+ 141 K+ 4.4 BUN 18 Cr 1.21 A1c6.1% AST 17 ALT 16    I have personally reviewed all labs including lipids 08/02/2021     Assessment:     1. Hypertension : Stable and well controlled and will continue present medical regimen. 2. GERD (gastroesophageal reflux disease) : managed per PCP. 3. DM (diabetes mellitus)  : managed per PCP     4. Atrial fibrillation:  Mainstay of treatment is HR control and anticoagulation therapy. [facility] coumadin therapy now (used to be Dr. Roselle Sandhoff office). I have d/w him changing to NOAC but still wants coumadin at this time.  Most recent EKG today AF 80bpm; IRBB, left axis

## 2021-12-01 NOTE — PATIENT INSTRUCTIONS
Plan:  1. Start taking lasix 40 mg as needed for swelling, weight gain, or shortness of breath   -if weight gain of more than 2-3 pounds in a day or 3-5 pounds in a week.   2. Continue to follow up with Ralf Lindo MD for routine labs    Follow up with me in 1 year

## 2021-12-01 NOTE — PROGRESS NOTES
[address]   Cardiac Followup    Referring Provider:  Wil Sparks MD     No chief complaint on file. Subjective: Patient is being seen today for cardiology follow up for mild non-obstructive CAD,  HTN, and chronic afib; c/o ***    Past Medical History:    Mr. Wilver Mendez is a 2o male with PMH mild NOCAD dx 2006 GERD, HTN, CLARISSE (does not use CPAP often), and chronic afib on coumadin. [id number] [facility] manages his PT/INR and adjusts his medications. He underwent  cardiac cath on 8/9/13 which showed mild non-obstructive disease managed medically. No need for PCI. Note SAJI BLE 1/4/2016 showed no evidence of stenosis in BLE. Normal SAJI's ankle-brachial  measured at 1.19 on the right and 1.25 on the left. Most recent Cardiac event monitor 6/20-7/3/17 AFIB showed an average HR of 85 ()  was brief. Carotid US 2/22/18 was negative for blockage. Due to SOB he underwent most recent Lexiscan myoview stress test 3/7/19 LVEF 54%  Apical hypokinesis  Fixed apical defect likely due to scar; No ischemia. Most recent ECHO 3/7/19 showed EF=35-40% with global HK (2012 and 2017 EF=55-60%). Due to worsening LVEF and SOB he underwent most recent [facility] 3/15/19 LM: distal 40% shelf like lesion LAD: ERNESTO-2 sluggish flow, luminals LCX: small, luminals RCA: dominant, luminals LVEDP:15 LVEF: 55% [facility]  RA: 11 RV: 30/10  PA:  30/16 and mean of 20 PCWP: 15 Sats: On RA Ao: 94% RA: 61% PA: 62% CO/CI 4.28/1.8. Note EKG 5/1/19  Atrial fibrillation; Left axis deviation; nonspecific ST abnormality  He reports his wife d/c'd Amlodipine started by Dr. Jordan Johnson after cath due to swollen feet. Sluggish blood flow in LAD is reason amlodipine was started. Dr Cindy Valdes lowered his Cardura from 2mg to 1 mg due to dizziness 8/19/19 and I decreased toprol XL from 100 to 50mg qd. Note he did NOT decrease toprol dose  He only takes lasix PRN even though prescribed daily.      History of Present Illness:   Last OV 12/10/20, he in his paternal grandfather; Heart Failure in his father and mother; Hypertension in his father and mother; Other in his father and mother; Prostate Cancer in his maternal uncle. Home Medications:  Current Outpatient Medications on File Prior to Visit   Medication Sig Dispense Refill    pravastatin (PRAVACHOL) 20 MG tablet TAKE 1 TABLET BY MOUTH  DAILY 90 tablet 3    ipratropium-albuterol (DUONEB) 0.5-2.5 (3) MG/3ML SOLN nebulizer solution Inhale 3 mLs into the lungs every 6 hours as needed for Shortness of Breath 360 mL 5    pantoprazole (PROTONIX) 40 MG tablet TAKE 1 TABLET BY MOUTH  DAILY 90 tablet 3    fluticasone-salmeterol (ADVAIR DISKUS) 250-50 MCG/DOSE AEPB USE 1 INHALATION TWO TIMES  DAILY 180 each 3    albuterol sulfate HFA (PROAIR HFA) 108 (90 Base) MCG/ACT inhaler Inhale 2 puffs into the lungs every 6 hours as needed for Wheezing or Shortness of Breath 3 Inhaler 3    furosemide (LASIX) 40 MG tablet TAKE 1 TABLET BY MOUTH  DAILY 90 tablet 3    metoprolol succinate (TOPROL XL) 100 MG extended release tablet TAKE ONE-HALF TABLET BY  MOUTH DAILY 45 tablet 3    warfarin (COUMADIN) 2 MG tablet Take 1 tablet by mouth daily 90 tablet 1    warfarin (COUMADIN) 10 MG tablet Take 1 tablet by mouth daily Every day except Wednesday. 90 tablet 1    warfarin (COUMADIN) 5 MG tablet TAKE 1 TO 2 TABLETS BY  MOUTH DAILY AS DIRECTED 180 tablet 1    lisinopril (PRINIVIL;ZESTRIL) 10 MG tablet TAKE 1 TABLET BY MOUTH  DAILY 90 tablet 3    fenofibrate (TRICOR) 54 MG tablet TAKE 1 TABLET BY MOUTH  DAILY 90 tablet 3    insulin glargine (LANTUS SOLOSTAR) 100 UNIT/ML injection pen Inject 50 units twice a day.       levothyroxine (SYNTHROID) 75 MCG tablet TAKE 1 TABLET BY MOUTH  DAILY 90 tablet 1    TRULICITY 1.5 NL/2.9XE SOPN       doxazosin (CARDURA) 2 MG tablet Take 0.5 tablets by mouth daily (dose reduction) 30 tablet 0    diclofenac sodium 1 % GEL APPLY 4 G TOPICALLY 4 TIMES DAILY (Patient not taking: Reported on 11/10/2021) 100 g 3    JARDIANCE 25 MG tablet TAKE 1 TABLET EVERY MORNING  2    glucose blood VI test strips (CRUZITO CONTOUR NEXT TEST) strip Testing 4 x a day DX: E11.42 125 each 5    fluticasone (FLONASE) 50 MCG/ACT nasal spray 2 sprays by Nasal route daily 3 Bottle 1    Insulin Pen Needle (NOVOFINE) 30G X 8 MM MISC APPLY 1 EACH TOPICALLY 2 TIMES DAILY. 300 each 2     No current facility-administered medications on file prior to visit. Allergies:  Lyrica [pregabalin], Metoclopramide, Simvastatin, and Amlodipine     Review of Systems:   · Constitutional: there has been no unanticipated weight loss. There's been no change in energy level, sleep pattern, or activity level. · Eyes: No visual changes or diplopia. No scleral icterus. · ENT: No Headaches, hearing loss or vertigo. No mouth sores or sore throat. · Cardiovascular: Reviewed in HPI  · Respiratory: No cough or wheezing, no sputum production. No hematemesis. · Gastrointestinal: No abdominal pain, appetite loss, blood in stools. No change in bowel or bladder habits. · Genitourinary: No dysuria, trouble voiding, or hematuria. · Musculoskeletal:  No gait disturbance, weakness or joint complaints. · Integumentary: No rash or pruritis. · Neurological: No headache, diplopia, change in muscle strength, numbness or tingling. No change in gait, balance, coordination, mood, affect, memory, mentation, behavior. · Psychiatric: No anxiety, no depression. · Endocrine: No malaise, fatigue or temperature intolerance. No excessive thirst, fluid intake, or urination. No tremor. · Hematologic/Lymphatic: No abnormal bruising or bleeding, blood clots or swollen lymph nodes. · Allergic/Immunologic: No nasal congestion or hives. Physical Examination:    There were no vitals filed for this visit.     Wt Readings from Last 3 Encounters:   11/10/21 230 lb 12.8 oz (104.7 kg)   11/04/21 230 lb 2 oz (104.4 kg)   05/25/21 236 lb 3.2 oz (107.1 kg) Constitutional and General Appearance: NAD; left cheek abrasion and bruise  Respiratory:  · Normal excursion and expansion without use of accessory muscles  · Resp Auscultation: Soft breath sounds without dullness  Cardiovascular:  · The apical impulses not displaced  · Heart tones are crisp and normal  · Cervical veins are not engorged  · The carotid upstroke is normal in amplitude and contour without delay or bruit  · Irregularly irregular, No S3, No Murmur  · Peripheral pulses are symmetrical and full  · There is no clubbing, cyanosis of the extremities. · no edema  · Femoral Arteries: 2+ and equal  · Pedal Pulses: 2+ and equal   Abdomen:  · No masses or tenderness  · Liver/Spleen: No Abnormalities Noted  Neurological/Psychiatric:  · Alert and oriented in all spheres  · Moves all extremities well  · Exhibits normal gait balance and coordination  · No abnormalities of mood, affect, memory, mentation, or behavior are noted  ·     Lab Results   Component Value Date    CHOL 131 08/02/2021    CHOL 145 06/19/2017    CHOL 145 06/07/2017     Lab Results   Component Value Date    TRIG 171 (H) 08/02/2021    TRIG 528 (H) 06/19/2017    TRIG 225 (H) 06/07/2017     Lab Results   Component Value Date    HDL 30 (L) 08/02/2021    HDL 32 (L) 01/06/2021    HDL 23 (L) 06/11/2019     Lab Results   Component Value Date    LDLCALC 90 12/08/2016    LDLCALC 82 12/08/2015    LDLCALC 65 06/08/2015     Lab Results   Component Value Date    LABVLDL see below 12/08/2016    LABVLDL 48 12/08/2015    LABVLDL 49 06/08/2015     Last labs 12/02/20:   Na+ 141 K+ 4.4 BUN 18 Cr 1.21 A1c6.1% AST 17 ALT 16     Assessment:     1. Hypertension : Stable and well controlled and will continue present medical regimen. 2. GERD (gastroesophageal reflux disease) : managed per PCP. 3. DM (diabetes mellitus)  : managed per PCP     4. Atrial fibrillation:  Mainstay of treatment is HR control and anticoagulation therapy. [facility] manages his coumadin therapy now (used to be Dr. Imani Garcia office). I have d/w him changing to NOAC but still wants coumadin at this time. Most recent EKG 5/1/19  Atrial fibrillationLeft axis deviation; Nonspecific ST abnormality. Given balance issues and falls I had discussion about risks/benefits of AC. He wants to continue coumadin and accept bleeding risk. He is more afraid of stroke. I will d/c baby aspirin and keep on coumadin only. 5. CAD (coronary artery disease):  Mild non-obstructive CAD and will continue medical management. Most recent [facility] 3/15/19 LM: distal 40% shelf like lesion LAD: ERNESTO-2 sluggish flow, luminals LCX: small, luminals RCA: dominant, luminals LVEDP:15 LVEF: 55%. There are no concerning symptoms for angina currently. 6.  Hyperlipidemia:  Most recent 6/11/19 I personally reviewed   HDL 23 LDL 52. He had intolerance to Zocor therapy back in Jan 2013 with muscle cramping and CK was noted to 476. He continues Pravastatin 20mg daily. He has been tolerating without diffuse muscle cramping. His TG were 315 and is too high  PCP did not want to change regimen. Needs to watch diet more closely. No labs since 2019 and need to recheck. Plan:  1. ***    Cost of prescription medications and patient compliance have been reviewed with patient. All questions answered. Thank you for allowing me to participate in the care of this individual.    ***      ****     Reinier Sanchez.  Surinder Shaikh M.D., [facility] [place]

## 2021-12-09 ENCOUNTER — TELEPHONE (OUTPATIENT)
Dept: FAMILY MEDICINE CLINIC | Age: 71
End: 2021-12-09

## 2021-12-09 DIAGNOSIS — E11.40 DIABETIC NEUROPATHY, PAINFUL (HCC): Primary | ICD-10-CM

## 2021-12-09 NOTE — TELEPHONE ENCOUNTER
Patient is requesting a referral to [address] [place] [address] spine clinic for interventional pain management for his diabetic neuropathy.

## 2021-12-29 ENCOUNTER — ANTI-COAG VISIT (OUTPATIENT)
Dept: PHARMACY | Age: 71
End: 2021-12-29
Payer: COMMERCIAL

## 2021-12-29 DIAGNOSIS — I48.20 CHRONIC ATRIAL FIBRILLATION (HCC): Primary | ICD-10-CM

## 2021-12-29 LAB — INTERNATIONAL NORMALIZATION RATIO, POC: 2.8

## 2021-12-29 PROCEDURE — 99211 OFF/OP EST MAY X REQ PHY/QHP: CPT | Performed by: PHARMACIST

## 2021-12-29 PROCEDURE — 85610 PROTHROMBIN TIME: CPT | Performed by: PHARMACIST

## 2022-01-07 ENCOUNTER — TELEPHONE (OUTPATIENT)
Dept: FAMILY MEDICINE CLINIC | Age: 72
End: 2022-01-07

## 2022-01-07 NOTE — TELEPHONE ENCOUNTER
Patient tested positive for covid symptoms started on Monday. Wife said he has had both vaccines and booster. He mostly just has sore throat. He has no fever no sob, oxygen levels are good. She was wanting to know if he should have infusion therapy or not. Discussed with provider on call, since his symptoms are not severe and he has been vaccinated, therapy is not recommended at this time.     Detailed message left for patient's wife

## 2022-01-13 ENCOUNTER — OFFICE VISIT (OUTPATIENT)
Dept: PULMONOLOGY | Age: 72
End: 2022-01-13
Payer: COMMERCIAL

## 2022-01-13 VITALS
BODY MASS INDEX: 32.93 KG/M2 | TEMPERATURE: 98.5 F | RESPIRATION RATE: 18 BRPM | DIASTOLIC BLOOD PRESSURE: 63 MMHG | HEIGHT: 70 IN | SYSTOLIC BLOOD PRESSURE: 108 MMHG | OXYGEN SATURATION: 98 % | WEIGHT: 230 LBS | HEART RATE: 64 BPM

## 2022-01-13 DIAGNOSIS — Z86.16 PERSONAL HISTORY OF COVID-19: ICD-10-CM

## 2022-01-13 DIAGNOSIS — I42.9 CARDIOMYOPATHY, UNSPECIFIED TYPE (HCC): ICD-10-CM

## 2022-01-13 DIAGNOSIS — Z87.891 FORMER SMOKER: ICD-10-CM

## 2022-01-13 DIAGNOSIS — G47.33 OSA (OBSTRUCTIVE SLEEP APNEA): Primary | ICD-10-CM

## 2022-01-13 DIAGNOSIS — J96.11 CHRONIC RESPIRATORY FAILURE WITH HYPOXIA (HCC): ICD-10-CM

## 2022-01-13 DIAGNOSIS — J41.0 SIMPLE CHRONIC BRONCHITIS (HCC): ICD-10-CM

## 2022-01-13 DIAGNOSIS — I25.10 CORONARY ARTERY CALCIFICATION SEEN ON CT SCAN: ICD-10-CM

## 2022-01-13 PROCEDURE — 1123F ACP DISCUSS/DSCN MKR DOCD: CPT | Performed by: INTERNAL MEDICINE

## 2022-01-13 PROCEDURE — G8417 CALC BMI ABV UP PARAM F/U: HCPCS | Performed by: INTERNAL MEDICINE

## 2022-01-13 PROCEDURE — 4040F PNEUMOC VAC/ADMIN/RCVD: CPT | Performed by: INTERNAL MEDICINE

## 2022-01-13 PROCEDURE — 3023F SPIROM DOC REV: CPT | Performed by: INTERNAL MEDICINE

## 2022-01-13 PROCEDURE — 3017F COLORECTAL CA SCREEN DOC REV: CPT | Performed by: INTERNAL MEDICINE

## 2022-01-13 PROCEDURE — G8427 DOCREV CUR MEDS BY ELIG CLIN: HCPCS | Performed by: INTERNAL MEDICINE

## 2022-01-13 PROCEDURE — 1036F TOBACCO NON-USER: CPT | Performed by: INTERNAL MEDICINE

## 2022-01-13 PROCEDURE — 99214 OFFICE O/P EST MOD 30 MIN: CPT | Performed by: INTERNAL MEDICINE

## 2022-01-13 PROCEDURE — G8484 FLU IMMUNIZE NO ADMIN: HCPCS | Performed by: INTERNAL MEDICINE

## 2022-01-13 ASSESSMENT — SLEEP AND FATIGUE QUESTIONNAIRES
HOW LIKELY ARE YOU TO NOD OFF OR FALL ASLEEP IN A CAR, WHILE STOPPED FOR A FEW MINUTES IN TRAFFIC: 1
HOW LIKELY ARE YOU TO NOD OFF OR FALL ASLEEP WHEN YOU ARE A PASSENGER IN A CAR FOR AN HOUR WITHOUT A BREAK: 0
HOW LIKELY ARE YOU TO NOD OFF OR FALL ASLEEP WHILE WATCHING TV: 1
HOW LIKELY ARE YOU TO NOD OFF OR FALL ASLEEP WHILE SITTING QUIETLY AFTER LUNCH WITHOUT ALCOHOL: 3
ESS TOTAL SCORE: 8
HOW LIKELY ARE YOU TO NOD OFF OR FALL ASLEEP WHILE SITTING AND READING: 0
HOW LIKELY ARE YOU TO NOD OFF OR FALL ASLEEP WHILE SITTING AND TALKING TO SOMEONE: 0
HOW LIKELY ARE YOU TO NOD OFF OR FALL ASLEEP WHILE SITTING INACTIVE IN A PUBLIC PLACE: 0
HOW LIKELY ARE YOU TO NOD OFF OR FALL ASLEEP WHILE LYING DOWN TO REST IN THE AFTERNOON WHEN CIRCUMSTANCES PERMIT: 3
NECK CIRCUMFERENCE (INCHES): 20.25

## 2022-01-13 NOTE — PROGRESS NOTES
MA Communication: The following orders are received by verbal communication from Flavio Alva MD    Orders include:     Follow up 3 months

## 2022-01-13 NOTE — PATIENT INSTRUCTIONS
Remember to bring a list of pulmonary medications and any CPAP or BiPAP machines to your next appointment with the office. Please keep all of your future appointments scheduled by [facility]. Out of respect for other patients and providers, you may be asked to reschedule your appointment if you arrive later than your scheduled appointment time. Appointments cancelled less than 24hrs in advance will be considered a no show. Patients with three missed appointments within 1 year or four missed appointments within 2 years can be dismissed from the practice. Please be aware that our physicians are required to work in the Intensive Care Unit at [facility].  Your appointment may need to be rescheduled if they are designated to work during your appointment time. You may receive a survey regarding the care you received during your visit. Your input is valuable to us. We encourage you to complete and return your survey. We hope you will choose us in the future for your healthcare needs. Pt instructed of all future appointment dates & times, including radiology, labs, procedures & referrals. If procedures were scheduled preparation instructions provided. Instructions on future appointments with [facility] Pulmonary were given.

## 2022-01-14 NOTE — PROGRESS NOTES
C/O Pulmonary follow up and to be established to this practice      HPI: 77-year-old male who has come to the office for a pulm evaluation, patient used to see Dr. Jaylyn Pablo at [facility] pulmonology prior to this, patient states that he has been having some stuffiness of the ears and patient had gone to ENT who recommended that patient should have a repeat swallow testing, patient has some secretions which he cannot get up properly, once the secretions are expectorated there is light yellow in color, patient is exercise tolerance is limited to about 200 feet, patient also has occasional wheezing at times, patient does not have any significant fever or chills, no pleuritic chest pain, no reflux symptoms of concern, patient has back pain on a chronic basis, patient does not have any significant dysuria or hematuria, patient does have some constipation, patient has swelling of the lower extremities on dangling of the feet, patient also feels tired and having less energy, patient's has leg swelling which has gone down, patient has pets at home which are not new, patient has a gas placed heating system without any humidifier, patient had COVID-19 infection in November of last year, patient does not have any humidifier with a concentrator, no other pertinent review of system of concern        Review of Systems same as above     Physical Exam:  Blood pressure 108/63, pulse 64, temperature 98.5 °F (36.9 °C), temperature source Temporal, resp. rate 18, height 5' 10\" (1.778 m), weight 230 lb (104.3 kg), SpO2 98 %.'  Constitutional:  No acute distress. HENT:  Oropharynx is clear and moist. Nothyromegaly. Eyes:  Conjunctivae are normal. Pupils equal, round, and reactive to light. No scleral icterus. Neck: . No tracheal deviation present. No obviousthyroid mass. Short enlarged neck   Cardiovascular: Normal rate, regular rhythm, normal heart sounds. No right ventricular heave. No lower extremity edema.   Pulmonary/Chest: No wheezes. No rales. Chest wall is not dull to percussion. No accessory muscle usage or stridor. Decreased BSI   Abdominal: Soft. Bowel sounds present. No distension or hernia. No tenderness. Musculoskeletal : No cyanosis. No clubbing. No obvious joint deformity. Lymphadenopathy: No cervical or supraclavicularadenopathy. Skin: Skin is warm and dry. No rash or nodules on the exposed extremities. Psychiatric: Normal mood and affect. Behavior is normal.  No anxiety. Neurologic : Alert, awake and oriented. PERRL. Speechfluent      Sleep Medicine Data:  Sitting and reading: Would never doze  Watching TV: Slight chance of dozing  Sitting, inactive in a public place (e.g. a theatre or a meeting): Would never doze  As a passenger in a car for an hour without a break: Would never doze  Lying down to rest in the afternoon when circumstances permit: High chance of dozing  Sitting and talking to someone: Would never doze  Sitting quietly after a lunch without alcohol: High chance of dozing  In a car, while stopped for a few minutes in traffic: Slight chance of dozing  Total score: 8  Neck circumference: 20.25        Data:     Imaging:  I have reviewed radiology images personally. No orders to display     CTA OF THE CHEST 1/2/2019 3:54 am       TECHNIQUE:   CTA of the chest was performed after the administration of intravenous   contrast.  Multiplanar reformatted images are provided for review.  MIP   images are provided for review.  Dose modulation, iterative reconstruction,   and/or weight based adjustment of the mA/kV was utilized to reduce the   radiation dose to as low as reasonably achievable.       COMPARISON:   10/27/2014       HISTORY:   ORDERING SYSTEM PROVIDED HISTORY: CHEST PAIN, CHRONIC, HIGH PROBABILITY OF CAD   TECHNOLOGIST PROVIDED HISTORY:   Ordering Physician Provided Reason for Exam: sternal pain with hx of afib   Acuity: Acute   Type of Exam: Initial       Midsternal chest pain       FINDINGS: Pulmonary Arteries: Motion artifact limits evaluation at the lung bases.  No   definite evidence of intraluminal filling defect to suggest pulmonary   embolism.  Main pulmonary artery is normal in caliber.       Mediastinum: No evidence of mediastinal lymphadenopathy.  The heart and   pericardium demonstrate no acute abnormality.  Coronary artery calcifications   are noted. Danyelle Tavo is no acute abnormality of the thoracic aorta.       Lungs/pleura: There is no pneumothorax or pleural effusion.  The central   airways are patent.  There are bilateral upper lobe poorly defined   ground-glass opacities.       Upper Abdomen: Limited images of the upper abdomen are unremarkable.       Soft Tissues/Bones: No acute bone or soft tissue abnormality.           Impression   1. Limited study with no definite scan evidence for pulmonary embolus. 2. Coronary artery disease. 3. Ground-glass opacities are nonspecific though are most likely infectious   or inflammatory. ECHO in past -Summary   This is a limited study for A-fib follow-up. Left ventricular systolic function is mild to moderately reduced with   ejection fraction estimated at 35-40 %. There is moderate global hypokinesis, difficult to evaluate LV Function due   to afib. PFT in 2012-  PFTs 10/9/12  FVC 4.34 (91%) FEV1 3.40 (94%) FEV1/FVC ratio 78%  TLC 5.92 (82%) DLCO 25.9 (82%) no bd response  6MWT 1400 feet, low sat 94%    Patient's compliance data for CLARISSE's states that patient uses CPAP/BiPAP machine only on 11 days out of 30, patient uses a the machine more than 4 hours was only 23%, patient's average usage on all days used was 1 hour and 45 minutes, patient has AHI of 5.2/h along with that patient has no Cheyne-Narayan respiration    Assessment:    1. Simple chronic bronchitis (HCC)    - fluticasone-salmeterol (ADVAIR DISKUS) 250-50 MCG/DOSE AEPB; USE 1 INHALATION TWO TIMES  DAILY  Dispense: 180 each; Refill: 3    2.  CLARISSE (obstructive sleep apnea)      3. Former smoker      4. Coronary artery calcification seen on CT scan      5.  Cardiomyopathy, unspecified type ([address]        6. Personal history of COVID-19            Plan:   · Patient's review of system were discussed  · Patient was told about the clinical findings during auscultation and implications  · Patient's previous imaging as well as echocardiogram results were reviewed  · Patient was told about the pathophysiology of the disease process and its modifying factors  · Patient was told about the consult for hypoxemia and hypercarbia  · Patient can continue with Advair inhaler 1 puff twice a day and to rinse mouth with water after use otherwise he can have hoarseness of voice oral thrush  · Patient to take albuterol inhaler 2 puffs every 6 on whenever necessary basis  · Patient needs to titrate the oxygen to keep sats) 89 to 95% only if required  · Patient has been given BiPAP for CLARISSE but patient has not been compliant with that and pros and cons discussed  · Patient is getting evaluated by ENT and speech evaluation has been requested even though previous evaluations have been not in favor of any significant  oropharyngeal dysphagia  · Patient can be subjected to repeat PFT if the patient so desires but wants to defer it for now  · Diet and lifestyle modification discussed  · Cardiac medications as per cardiology  · Patient is getting diuretics  · Weight loss will help  · Regular regimented exercise will help  · Synthroid and PPI as per IM  · Patient is vaccinated against COVID-19  · Patient's overall clinical status will depend upon patient's compliance and recommendations as above

## 2022-01-18 DIAGNOSIS — I48.20 CHRONIC ATRIAL FIBRILLATION (HCC): Chronic | ICD-10-CM

## 2022-01-19 RX ORDER — WARFARIN SODIUM 2 MG/1
2 TABLET ORAL DAILY
Qty: 90 TABLET | Refills: 1 | Status: SHIPPED | OUTPATIENT
Start: 2022-01-19 | End: 2022-07-08

## 2022-02-11 ENCOUNTER — HOSPITAL ENCOUNTER (OUTPATIENT)
Age: 72
Discharge: HOME | End: 2022-02-11
Payer: COMMERCIAL

## 2022-02-11 LAB
A/G RATIO: 1.6 (ref 1.1–2.2)
ALBUMIN SERPL-MCNC: 4.2 G/DL (ref 3.4–5)
ALP BLD-CCNC: 66 U/L (ref 40–129)
ALT SERPL-CCNC: 20 U/L (ref 10–40)
ANION GAP SERPL CALCULATED.3IONS-SCNC: 12 MMOL/L (ref 3–16)
AST SERPL-CCNC: 23 U/L (ref 15–37)
BILIRUB SERPL-MCNC: 0.5 MG/DL (ref 0–1)
BUN BLDV-MCNC: 13 MG/DL (ref 7–20)
CALCIUM SERPL-MCNC: 9.1 MG/DL (ref 8.3–10.6)
CHLORIDE BLD-SCNC: 106 MMOL/L (ref 99–110)
CO2: 21 MMOL/L (ref 21–32)
CREAT SERPL-MCNC: 0.9 MG/DL (ref 0.8–1.3)
GFR AFRICAN AMERICAN: >60
GFR NON-AFRICAN AMERICAN: >60
GLUCOSE BLD-MCNC: 97 MG/DL (ref 70–99)
POTASSIUM SERPL-SCNC: 4.2 MMOL/L (ref 3.5–5.1)
SODIUM BLD-SCNC: 139 MMOL/L (ref 136–145)
TOTAL PROTEIN: 6.9 G/DL (ref 6.4–8.2)

## 2022-02-11 PROCEDURE — 84443 ASSAY THYROID STIM HORMONE: CPT

## 2022-02-11 PROCEDURE — 84207 ASSAY OF VITAMIN B-6: CPT

## 2022-02-11 PROCEDURE — 86038 ANTINUCLEAR ANTIBODIES: CPT

## 2022-02-11 PROCEDURE — 84155 ASSAY OF PROTEIN SERUM: CPT

## 2022-02-11 PROCEDURE — 84165 PROTEIN E-PHORESIS SERUM: CPT

## 2022-02-11 PROCEDURE — 84481 FREE ASSAY (FT-3): CPT

## 2022-02-11 PROCEDURE — 83825 ASSAY OF MERCURY: CPT

## 2022-02-11 PROCEDURE — 84182 PROTEIN WESTERN BLOT TEST: CPT

## 2022-02-11 PROCEDURE — 84439 ASSAY OF FREE THYROXINE: CPT

## 2022-02-11 PROCEDURE — 82746 ASSAY OF FOLIC ACID SERUM: CPT

## 2022-02-11 PROCEDURE — 82175 ASSAY OF ARSENIC: CPT

## 2022-02-11 PROCEDURE — 83090 ASSAY OF HOMOCYSTEINE: CPT

## 2022-02-11 PROCEDURE — 86235 NUCLEAR ANTIGEN ANTIBODY: CPT

## 2022-02-11 PROCEDURE — 82570 ASSAY OF URINE CREATININE: CPT

## 2022-02-11 PROCEDURE — 86706 HEP B SURFACE ANTIBODY: CPT

## 2022-02-11 PROCEDURE — 82306 VITAMIN D 25 HYDROXY: CPT

## 2022-02-11 PROCEDURE — 80053 COMPREHEN METABOLIC PANEL: CPT

## 2022-02-11 PROCEDURE — 80061 LIPID PANEL: CPT

## 2022-02-11 PROCEDURE — 83036 HEMOGLOBIN GLYCOSYLATED A1C: CPT

## 2022-02-11 PROCEDURE — 86618 LYME DISEASE ANTIBODY: CPT

## 2022-02-11 PROCEDURE — 86803 HEPATITIS C AB TEST: CPT

## 2022-02-11 PROCEDURE — 82043 UR ALBUMIN QUANTITATIVE: CPT

## 2022-02-11 PROCEDURE — 36415 COLL VENOUS BLD VENIPUNCTURE: CPT

## 2022-02-11 PROCEDURE — 83655 ASSAY OF LEAD: CPT

## 2022-02-12 LAB
ANTI-NUCLEAR ANTIBODY (ANA): NEGATIVE
ANTI-SS-A IGG: <0.2 AI (ref 0–0.9)
CHOLESTEROL, TOTAL: 120 MG/DL (ref 0–199)
CREATININE URINE: 118.4 MG/DL (ref 39–259)
ESTIMATED AVERAGE GLUCOSE: 134.1 MG/DL
FOLATE: >20 NG/ML (ref 4.78–24.2)
HBA1C MFR BLD: 6.3 %
HBV SURFACE AB TITR SER: <3.5 MIU/ML
HDLC SERPL-MCNC: 33 MG/DL (ref 40–60)
HEPATITIS C ANTIBODY INTERPRETATION: NORMAL
HOMOCYSTEINE: 10 UMOL/L (ref 0–10)
LDL CHOLESTEROL CALCULATED: 68 MG/DL
MICROALBUMIN UR-MCNC: <1.2 MG/DL
MICROALBUMIN/CREAT UR-RTO: NORMAL MG/G (ref 0–30)
T3 FREE: 2.7 PG/ML (ref 2.3–4.2)
T4 FREE: 1.4 NG/DL (ref 0.9–1.8)
TRIGL SERPL-MCNC: 97 MG/DL (ref 0–150)
TSH SERPL DL<=0.05 MIU/L-ACNC: 2.05 UIU/ML (ref 0.27–4.2)
VITAMIN D 25-HYDROXY: 49.2 NG/ML
VLDLC SERPL CALC-MCNC: 19 MG/DL

## 2022-02-13 LAB — LYME, EIA: 0.12 LIV (ref 0–1.2)

## 2022-02-14 LAB
ALBUMIN SERPL-MCNC: 3.5 G/DL (ref 3.1–4.9)
ALPHA-1-GLOBULIN: 0.2 G/DL (ref 0.2–0.4)
ALPHA-2-GLOBULIN: 0.7 G/DL (ref 0.4–1.1)
BETA GLOBULIN: 1.2 G/DL (ref 0.9–1.6)
GAMMA GLOBULIN: 1 G/DL (ref 0.6–1.8)
SPE/IFE INTERPRETATION: NORMAL
TOTAL PROTEIN: 6.6 G/DL (ref 6.4–8.2)

## 2022-02-16 LAB
ARSENIC BLOOD: <10 UG/L
LEAD LEVEL BLOOD: <2 UG/DL
MERCURY BLOOD: <2.5 UG/L

## 2022-02-17 LAB
MISCELLANEOUS LAB TEST ORDER: NORMAL
VITAMIN B6: 377.8 NMOL/L (ref 20–125)

## 2022-02-24 RX ORDER — LISINOPRIL 10 MG/1
10 TABLET ORAL DAILY
Qty: 90 TABLET | Refills: 3 | Status: SHIPPED | OUTPATIENT
Start: 2022-02-24

## 2022-04-12 ENCOUNTER — TELEPHONE (OUTPATIENT)
Dept: PULMONOLOGY | Age: 72
End: 2022-04-12

## 2022-04-12 NOTE — TELEPHONE ENCOUNTER
Patient called with message left for patient to call back to office. Appointment scheduled for 04/13/2022 for a 3 month follow up cancelled because Dr. Lester Davidson is out of the office. Patient will need to be rescheduled. Patient was also called on 04/01/22 and 04/04/2022.

## 2022-04-13 ENCOUNTER — ANTI-COAG VISIT (OUTPATIENT)
Dept: PHARMACY | Age: 72
End: 2022-04-13
Payer: COMMERCIAL

## 2022-04-13 DIAGNOSIS — I48.20 CHRONIC ATRIAL FIBRILLATION (HCC): Primary | ICD-10-CM

## 2022-04-13 LAB — INTERNATIONAL NORMALIZATION RATIO, POC: 3.6

## 2022-04-13 PROCEDURE — 99211 OFF/OP EST MAY X REQ PHY/QHP: CPT | Performed by: PHARMACIST

## 2022-04-13 PROCEDURE — 85610 PROTHROMBIN TIME: CPT | Performed by: PHARMACIST

## 2022-04-13 NOTE — PROGRESS NOTES
is here for management of anticoagulation for AFib. PMH also significant for DM, Hyopthyroid, GERD, HTN, CAD, COPD. He presents today w/out complaint. Pt verifies dosing regimen as listed above. Pt denies s/s bleeding/bruising/swelling/SOB. No BRBPR. No melena. Reviewed pt medication list.  No changes in OTC/herbal medications. Reviewed dietary concerns. Pt states that he does not eat a lot of greens. No missed doses. No changes per pt. INR 3.6 is above the acceptable therapeutic range of 2-3. Recommend to reduce dose to 10 mg Sunday and 7.5 mg every other day. Patient has 5 mg and 2 mg tablets. Will continue to monitor and check INR in 4 weeks. Dosing reminder card given with phone number, appointment date, and time.   Return to clinic: 5/11/22 @ 10:00 am     Amandeep Beach PharmD 10:50 AM EDT 4/13/22

## 2022-05-11 ENCOUNTER — ANTI-COAG VISIT (OUTPATIENT)
Dept: PHARMACY | Age: 72
End: 2022-05-11
Payer: COMMERCIAL

## 2022-05-11 DIAGNOSIS — I48.20 CHRONIC ATRIAL FIBRILLATION (HCC): Primary | ICD-10-CM

## 2022-05-11 LAB — INR BLD: 3.7

## 2022-05-11 PROCEDURE — 99211 OFF/OP EST MAY X REQ PHY/QHP: CPT | Performed by: PHARMACIST

## 2022-05-11 PROCEDURE — 85610 PROTHROMBIN TIME: CPT | Performed by: PHARMACIST

## 2022-05-11 NOTE — PROGRESS NOTES
is here for management of anticoagulation for AFib. PMH also significant for DM, Hyopthyroid, GERD, HTN, CAD, COPD. He presents today w/out complaint. Pt verifies dosing regimen as listed above. Pt denies s/s bleeding/bruising/swelling/SOB. No BRBPR. No melena. Reviewed pt medication list.  No changes in OTC/herbal medications. Reviewed dietary concerns. Pt states that he does not eat a lot of greens. No missed doses. Pt reports he has not had much of an appetite over the past month and has decreased his eating. Pt has made an appt with his PCP to discuss appetite changes. No other changes. Will hold today's dose and decrease weekly dose. Follow up to recheck in 3 weeks,          INR 3.7 is above the acceptable therapeutic range of 2-3. Recommend to  Hold dose today, then reduce dose to 7.5 mg every day. Patient has 5 mg and 2 mg tablets. Will continue to monitor and check INR in 3 weeks. Dosing reminder card given with phone number, appointment date, and time. Return to clinic: 06/01/22 @ 10:00 am     Gabrielle Lew, Student Pharmacist 5/11/22 10:36 AM    I have seen the patient and reviewed the progress note written by the PharmD Candidate. I agree with this assessment and plan.    VERENA Puga [facility], PharmD 5/11/2022 10:43 AM

## 2022-05-16 LAB
P2PSA: 17 PG/ML
PHI-HYB: 36
PROSTATE SPECIFIC ANTIGEN FREE: 0.83 NG/ML
PROSTATE SPECIFIC ANTIGEN PERCENT FREE: 27 %
PSA, TOTAL: 3.07 NG/ML (ref 0–4)

## 2022-06-06 ENCOUNTER — OFFICE VISIT (OUTPATIENT)
Dept: FAMILY MEDICINE CLINIC | Age: 72
End: 2022-06-06
Payer: COMMERCIAL

## 2022-06-06 VITALS
HEART RATE: 84 BPM | TEMPERATURE: 98.1 F | WEIGHT: 225 LBS | BODY MASS INDEX: 32.28 KG/M2 | DIASTOLIC BLOOD PRESSURE: 52 MMHG | OXYGEN SATURATION: 97 % | SYSTOLIC BLOOD PRESSURE: 91 MMHG

## 2022-06-06 DIAGNOSIS — G47.33 OSA ON CPAP: ICD-10-CM

## 2022-06-06 DIAGNOSIS — Z99.89 OSA ON CPAP: ICD-10-CM

## 2022-06-06 DIAGNOSIS — E11.42 TYPE 2 DIABETES MELLITUS WITH DIABETIC POLYNEUROPATHY, WITH LONG-TERM CURRENT USE OF INSULIN (HCC): ICD-10-CM

## 2022-06-06 DIAGNOSIS — Z23 NEED FOR PNEUMOCOCCAL VACCINE: ICD-10-CM

## 2022-06-06 DIAGNOSIS — N52.01 ERECTILE DYSFUNCTION DUE TO ARTERIAL INSUFFICIENCY: ICD-10-CM

## 2022-06-06 DIAGNOSIS — I10 ESSENTIAL HYPERTENSION: Primary | ICD-10-CM

## 2022-06-06 DIAGNOSIS — I48.20 CHRONIC ATRIAL FIBRILLATION (HCC): ICD-10-CM

## 2022-06-06 DIAGNOSIS — Z79.4 TYPE 2 DIABETES MELLITUS WITH DIABETIC POLYNEUROPATHY, WITH LONG-TERM CURRENT USE OF INSULIN (HCC): ICD-10-CM

## 2022-06-06 DIAGNOSIS — E03.9 ACQUIRED HYPOTHYROIDISM: Chronic | ICD-10-CM

## 2022-06-06 DIAGNOSIS — E78.2 MIXED HYPERLIPIDEMIA: ICD-10-CM

## 2022-06-06 PROCEDURE — 90471 IMMUNIZATION ADMIN: CPT | Performed by: FAMILY MEDICINE

## 2022-06-06 PROCEDURE — 3044F HG A1C LEVEL LT 7.0%: CPT | Performed by: FAMILY MEDICINE

## 2022-06-06 PROCEDURE — 99214 OFFICE O/P EST MOD 30 MIN: CPT | Performed by: FAMILY MEDICINE

## 2022-06-06 PROCEDURE — 1123F ACP DISCUSS/DSCN MKR DOCD: CPT | Performed by: FAMILY MEDICINE

## 2022-06-06 PROCEDURE — 90732 PPSV23 VACC 2 YRS+ SUBQ/IM: CPT | Performed by: FAMILY MEDICINE

## 2022-06-06 RX ORDER — TADALAFIL 20 MG/1
20 TABLET ORAL DAILY PRN
Qty: 30 TABLET | Refills: 5 | Status: SHIPPED | OUTPATIENT
Start: 2022-06-06 | End: 2022-08-24

## 2022-06-06 ASSESSMENT — PATIENT HEALTH QUESTIONNAIRE - PHQ9
SUM OF ALL RESPONSES TO PHQ QUESTIONS 1-9: 0
2. FEELING DOWN, DEPRESSED OR HOPELESS: 0
SUM OF ALL RESPONSES TO PHQ QUESTIONS 1-9: 0
SUM OF ALL RESPONSES TO PHQ QUESTIONS 1-9: 0
SUM OF ALL RESPONSES TO PHQ9 QUESTIONS 1 & 2: 0
SUM OF ALL RESPONSES TO PHQ QUESTIONS 1-9: 0
1. LITTLE INTEREST OR PLEASURE IN DOING THINGS: 0

## 2022-06-06 NOTE — PATIENT INSTRUCTIONS
Please read the healthy family handout that you were given and share it with your family. Please compare this printed medication list with your medications at home to be sure they are the same. If you have any medications that are different please contact us immediately at [phone]. Also review your allergies that we have listed, these may also include medications that you have not been able to tolerate, make sure everything listed is correct. If you have any allergies that are different please contact us immediately at [phone].

## 2022-06-06 NOTE — PROGRESS NOTES
He presents for follow up of his chronic health problems. He states his blood sugar control has been good and he is keeping his hemoglobin A1c at goal he still goes to Dr. Pennie Oneill endocrinologist and will see him soon and get his blood work done. He is not sure why his blood pressure is low today at usually not that low he denies dehydration symptoms. He is scheduled to see the gastroenterologist tomorrow because he keeps having issues with constipation and then loose stools. He wanted me to check a knot on his elbow. On CPAp machine for CLARISSE but not every night. He wants to try Cialis again for ED since Viagra is not working he does have appointment with urology coming up this summer to discuss other options. He still has burning feet from neuropathy he has tried Neurontin and Lyrica in the past.  His neurologist gave him a prescription for medical marijuana but he does not really want to try that. Tests and documents reviewed: Last note labs     Objective:   BP (!) 91/52   Pulse 84   Temp 98.1 °F (36.7 °C) (Oral)   Wt 225 lb (102.1 kg)   SpO2 97%   BMI 32.28 kg/m²   BP Readings from Last 3 Encounters:   06/06/22 (!) 91/52   01/13/22 108/63   12/01/21 106/60     Physical Exam  Vitals reviewed. Constitutional:       Appearance: He is well-developed. He is not toxic-appearing. HENT:      Head: Normocephalic. Neck:      Thyroid: No thyroid mass or thyromegaly. Cardiovascular:      Rate and Rhythm: Normal rate and regular rhythm. Heart sounds: Normal heart sounds. No murmur heard. Pulmonary:      Effort: No respiratory distress. Breath sounds: Normal breath sounds. No wheezing or rales. Chest:   Breasts:      Right: No supraclavicular adenopathy. Abdominal:      General: There is no distension. Palpations: Abdomen is soft. There is no mass. Tenderness: There is no abdominal tenderness. There is no guarding or rebound.    Musculoskeletal:      Cervical back: Neck supple. Lymphadenopathy:      Cervical: No cervical adenopathy. Upper Body:      Right upper body: No supraclavicular adenopathy. Skin:     General: Skin is warm. Neurological:      Mental Status: He is alert and oriented to person, place, and time. Psychiatric:         Behavior: Behavior normal.         Thought Content: Thought content normal.         Judgment: Judgment normal.     Mild tenderness over epicondyle left elbow noted lateral  Assessment and Plan:   Diagnosis Orders   1. Essential hypertension     2. Acquired hypothyroidism     3. Chronic atrial fibrillation ([address]     4. Type 2 diabetes mellitus with diabetic polyneuropathy, with long-term current use of insulin ([address]     5. CLARISSE on CPAP     6. Mixed hyperlipidemia     7. Need for pneumococcal vaccine  Pneumococcal polysaccharide vaccine 23-valent greater than or equal to 3yo subcutaneous/IM   8. Erectile dysfunction due to arterial insufficiency  tadalafil (CIALIS) 20 MG tablet   FU with urology, GI and cardiology as planned. Try Cialis again  The problems listed in the assessment are stable unless otherwise indicated. He  was instructed to continue their current medications and treatment for the above problems unless otherwise indicated above. Age-specific preventative medicine recommendations were reviewed with patient today and the Healthy Family Handout was given to patient. Avoid tobacco products exposure. I have recommended that the patient follow CDC guidelines for prevention of COVID-19 infection. Follow up in 6mo. Call or return to office for any problems that develop before the next scheduled follow-up appointment. India Jenkins M.D. Parts of this note were completed using voice recognition transcription. Every effort was made to ensure accuracy; however, inadvertent transcription errors may be present.

## 2022-06-08 ENCOUNTER — HOSPITAL ENCOUNTER (OUTPATIENT)
Dept: GENERAL RADIOLOGY | Age: 72
Discharge: HOME | End: 2022-06-08
Payer: COMMERCIAL

## 2022-06-08 ENCOUNTER — HOSPITAL ENCOUNTER (OUTPATIENT)
Age: 72
Discharge: HOME | End: 2022-06-08
Payer: COMMERCIAL

## 2022-06-08 DIAGNOSIS — K59.00 CONSTIPATION, UNSPECIFIED CONSTIPATION TYPE: ICD-10-CM

## 2022-06-08 PROCEDURE — 74018 RADEX ABDOMEN 1 VIEW: CPT

## 2022-07-07 DIAGNOSIS — I48.20 CHRONIC ATRIAL FIBRILLATION (HCC): Chronic | ICD-10-CM

## 2022-07-08 RX ORDER — WARFARIN SODIUM 2 MG/1
2 TABLET ORAL DAILY
Qty: 90 TABLET | Refills: 1 | Status: SHIPPED | OUTPATIENT
Start: 2022-07-08 | End: 2022-07-26

## 2022-07-11 NOTE — PROGRESS NOTES
[facility]    Age 81 y.o.    male    1950    MRN [id number]    8/24/2022  Arrival Time_____________  OR Time____________[address]     Procedure(s):  MULTI COMPONENT PENILE PROSTHESIS INSERTION              [facility]                      General   Surgeon(s):  Randy Weeks, MD      DAY ADMIT ___  SDS/OP ___  OUTPT IN BED ___        Phone [phone] (home)     PCP _____________________ Phone_________________ Epic ( ) Epic CE ( ) Appt ________    ADDITIONAL INFO __________________________________ Cardio/Consult _____________    NOTES _____________________________________________________________________    ____________________________________________________________________________    PAT APPT DATE:________ TIME: ________  FAXED QAD: _______  (__) H&P w/ Hospitalist  __________________________________________________________________________  Preop Nurse phone screen complete: _____________  (__) CBC     (__) W/ DIFF ___________     (__) Hgb A1C    ___________  (__) CHEST X RAY   __________  (__) LIPID PROFILE  ___________  (__) EKG   __________  (__) PT/PTT   ___________  (__) PFT's   __________  (__) BMP   ___________  (__) CAROTIDS  __________  (__) CMP   ___________  (__) VEIN MAPPING  __________  (__) U/A   ___________  (__) HISTORY & PHYSICAL __________  (__) URINE C & S  ___________  (__) CARDIAC CLEARANCE __________  (__) U/A W/ FLEX  ___________  (__) PULM.  CLEARANCE __________  (__) SERUM PREGNANCY ___________  (__) Check Epic DOS orders __________  (__) TYPE & SCREEN __________repeat ( ) (__)  __________________ __________  (__) ALBUMIN Tod Fent ___________  (__)  __________________ __________  (__) TRANSFERRIN  ___________  (__)  __________________ __________  (__) LIVER PROFILE  ___________  (__)  __________________ __________  (__) MRSA NASAL SWAB ___________  (__) URINE PREG DOS __________  (__) SED RATE  ___________  (__) BLOOD SUGAR DOS __________  (__) C-REACTIVE PROTEIN ___________    (__) VITAMIN D HYDROXY ___________  (__) BLOOD THINNERS __________    (__) ACE/ ARBS: _____________________     (__) BETABLOCKERS __________________

## 2022-07-18 ENCOUNTER — OFFICE VISIT (OUTPATIENT)
Dept: PULMONOLOGY | Age: 72
End: 2022-07-18
Payer: COMMERCIAL

## 2022-07-18 VITALS
DIASTOLIC BLOOD PRESSURE: 69 MMHG | OXYGEN SATURATION: 98 % | HEIGHT: 70 IN | TEMPERATURE: 97.4 F | RESPIRATION RATE: 16 BRPM | BODY MASS INDEX: 32.1 KG/M2 | WEIGHT: 224.2 LBS | HEART RATE: 80 BPM | SYSTOLIC BLOOD PRESSURE: 119 MMHG

## 2022-07-18 DIAGNOSIS — G47.33 OSA (OBSTRUCTIVE SLEEP APNEA): ICD-10-CM

## 2022-07-18 DIAGNOSIS — J45.40 MODERATE PERSISTENT ASTHMA WITHOUT COMPLICATION: ICD-10-CM

## 2022-07-18 DIAGNOSIS — J96.11 CHRONIC RESPIRATORY FAILURE WITH HYPOXIA (HCC): Primary | ICD-10-CM

## 2022-07-18 DIAGNOSIS — J41.0 SIMPLE CHRONIC BRONCHITIS (HCC): ICD-10-CM

## 2022-07-18 PROCEDURE — 1123F ACP DISCUSS/DSCN MKR DOCD: CPT | Performed by: INTERNAL MEDICINE

## 2022-07-18 PROCEDURE — 99213 OFFICE O/P EST LOW 20 MIN: CPT | Performed by: INTERNAL MEDICINE

## 2022-07-18 RX ORDER — FLUTICASONE PROPIONATE AND SALMETEROL 250; 50 UG/1; UG/1
1 POWDER RESPIRATORY (INHALATION) EVERY 12 HOURS
Qty: 3 EACH | Refills: 3 | Status: SHIPPED | OUTPATIENT
Start: 2022-07-18

## 2022-07-18 RX ORDER — PREDNISONE 20 MG/1
20 TABLET ORAL DAILY
Qty: 10 TABLET | Refills: 0 | Status: SHIPPED | OUTPATIENT
Start: 2022-07-18 | End: 2022-07-28

## 2022-07-18 RX ORDER — ALBUTEROL SULFATE 90 UG/1
2 AEROSOL, METERED RESPIRATORY (INHALATION) EVERY 6 HOURS PRN
Qty: 3 EACH | Refills: 3 | Status: SHIPPED | OUTPATIENT
Start: 2022-07-18

## 2022-07-18 ASSESSMENT — SLEEP AND FATIGUE QUESTIONNAIRES
ESS TOTAL SCORE: 3
HOW LIKELY ARE YOU TO NOD OFF OR FALL ASLEEP IN A CAR, WHILE STOPPED FOR A FEW MINUTES IN TRAFFIC: 0
HOW LIKELY ARE YOU TO NOD OFF OR FALL ASLEEP WHEN YOU ARE A PASSENGER IN A CAR FOR AN HOUR WITHOUT A BREAK: 0
HOW LIKELY ARE YOU TO NOD OFF OR FALL ASLEEP WHILE WATCHING TV: 0
HOW LIKELY ARE YOU TO NOD OFF OR FALL ASLEEP WHILE SITTING AND READING: 2
HOW LIKELY ARE YOU TO NOD OFF OR FALL ASLEEP WHILE SITTING AND TALKING TO SOMEONE: 0
HOW LIKELY ARE YOU TO NOD OFF OR FALL ASLEEP WHILE LYING DOWN TO REST IN THE AFTERNOON WHEN CIRCUMSTANCES PERMIT: 1
HOW LIKELY ARE YOU TO NOD OFF OR FALL ASLEEP WHILE SITTING INACTIVE IN A PUBLIC PLACE: 0
HOW LIKELY ARE YOU TO NOD OFF OR FALL ASLEEP WHILE SITTING QUIETLY AFTER LUNCH WITHOUT ALCOHOL: 0

## 2022-07-18 NOTE — PROGRESS NOTES
C/O Pulmonary follow up and to discuss clinical status and options    Patient has come to the office for a pulmonary follow-up, patient states that he does have some occasional cough with white secretions, patient has occasional wheezing, patient does have some blocked nose, patient also has had some extra beats along with that patient has some rib pain, patient states on on hot days in the temperature has been more than 95 °F patient has reduce his risk inhaler once or twice a day, patient does not have any significant fever or chills, no abdominal symptoms of concern, no new medications per se, patient does not have any increasing leg swelling, patient does not have any other pertinent review of system of concern     Previous HPI: 82-year-old male who has come to the office for a pulm evaluation, patient used to see Dr. Janene Perea at [facility] pulmonology prior to this, patient states that he has been having some stuffiness of the ears and patient had gone to ENT who recommended that patient should have a repeat swallow testing, patient has some secretions which he cannot get up properly, once the secretions are expectorated there is light yellow in color, patient is exercise tolerance is limited to about 200 feet, patient also has occasional wheezing at times, patient does not have any significant fever or chills, no pleuritic chest pain, no reflux symptoms of concern, patient has back pain on a chronic basis, patient does not have any significant dysuria or hematuria, patient does have some constipation, patient has swelling of the lower extremities on dangling of the feet, patient also feels tired and having less energy, patient's has leg swelling which has gone down, patient has pets at home which are not new, patient has a gas placed heating system without any humidifier, patient had COVID-19 infection in November of last year, patient does not have any humidifier with a concentrator, no other pertinent review of system of concern        Review of Systems same as above     Physical Exam:  Blood pressure 119/69, pulse 80, temperature 97.4 °F (36.3 °C), temperature source Temporal, resp. rate 16, height 5' 10\" (1.778 m), weight 224 lb 3.2 oz (101.7 kg), SpO2 98 %.'  Constitutional:  No acute distress. HENT:  Oropharynx is clear and moist. Nothyromegaly. Eyes:  Conjunctivae are normal. Pupils equal, round, and reactive to light. No scleral icterus. Neck: . No tracheal deviation present. No obviousthyroid mass. Short enlarged neck   Cardiovascular: Normal rate, regular rhythm, normal heart sounds. No right ventricular heave. No lower extremity edema. Pulmonary/Chest: No wheezes. No rales. Chest wall is not dull to percussion. No accessory muscle usage or stridor. Decreased BSI   Abdominal: Soft. Bowel sounds present. No distension or hernia. No tenderness. Musculoskeletal : No cyanosis. No clubbing. No obvious joint deformity. Lymphadenopathy: No cervical or supraclavicularadenopathy. Skin: Skin is warm and dry. No rash or nodules on the exposed extremities. Psychiatric: Normal mood and affect. Behavior is normal.  No anxiety. Neurologic : Alert, awake and oriented. PERRL. Speechfluent      Sleep Medicine Data:  Sitting and reading: Moderate chance of dozing  Watching TV: Would never doze  Sitting, inactive in a public place (e.g. a theatre or a meeting): Would never doze  As a passenger in a car for an hour without a break: Would never doze  Lying down to rest in the afternoon when circumstances permit: Slight chance of dozing  Sitting and talking to someone: Would never doze  Sitting quietly after a lunch without alcohol: Would never doze  In a car, while stopped for a few minutes in traffic: Would never doze  Total score: 3           Data:     Imaging:  I have reviewed radiology images personally.   No orders to display     CTA OF THE CHEST 1/2/2019 3:54 am       TECHNIQUE:   CTA of the chest was performed after the administration of intravenous   contrast.  Multiplanar reformatted images are provided for review. MIP   images are provided for review. Dose modulation, iterative reconstruction,   and/or weight based adjustment of the mA/kV was utilized to reduce the   radiation dose to as low as reasonably achievable. COMPARISON:   10/27/2014       HISTORY:   ORDERING SYSTEM PROVIDED HISTORY: CHEST PAIN, CHRONIC, HIGH PROBABILITY OF CAD   TECHNOLOGIST PROVIDED HISTORY:   Ordering Physician Provided Reason for Exam: sternal pain with hx of afib   Acuity: Acute   Type of Exam: Initial       Midsternal chest pain       FINDINGS:   Pulmonary Arteries: Motion artifact limits evaluation at the lung bases. No   definite evidence of intraluminal filling defect to suggest pulmonary   embolism. Main pulmonary artery is normal in caliber. Mediastinum: No evidence of mediastinal lymphadenopathy. The heart and   pericardium demonstrate no acute abnormality. Coronary artery calcifications   are noted. There is no acute abnormality of the thoracic aorta. Lungs/pleura: There is no pneumothorax or pleural effusion. The central   airways are patent. There are bilateral upper lobe poorly defined   ground-glass opacities. Upper Abdomen: Limited images of the upper abdomen are unremarkable. Soft Tissues/Bones: No acute bone or soft tissue abnormality. Impression   1. Limited study with no definite scan evidence for pulmonary embolus. 2. Coronary artery disease. 3. Ground-glass opacities are nonspecific though are most likely infectious   or inflammatory. ECHO in past -Summary   This is a limited study for A-fib follow-up. Left ventricular systolic function is mild to moderately reduced with   ejection fraction estimated at 35-40 %. There is moderate global hypokinesis, difficult to evaluate LV Function due   to afib.     PFT in 2012-  PFTs 10/9/12  FVC 4.34 (91%) FEV1 3.40 (94%) FEV1/FVC ratio 78%  TLC 5.92 (82%) DLCO 25.9 (82%) no bd response  6MWT 1400 feet, low sat 94%    Patient's compliance data for CLARISSE's states that patient uses CPAP/BiPAP machine only on 11 days out of 30, patient uses a the machine more than 4 hours was only 23%, patient's average usage on all days used was 1 hour and 45 minutes, patient has AHI of 5.2/h along with that patient has no Cheyne-Narayan respiration    Assessment:    1. Simple chronic bronchitis ([address]  2. CLARISSE (obstructive sleep apnea)      3. Former smoker      4. Coronary artery calcification seen on CT scan      5.  Cardiomyopathy, unspecified type [facility])                Plan:   Patient's review of system were discussed  Patient was told about the clinical findings during auscultation and implications  Patient was told about the pathophysiology of the disease process and its modifying factors  Patient was told about the consult for hypoxemia and hypercarbia  Patient can continue with Advair inhaler 1 puff twice a day and to rinse mouth with water after use otherwise he can have hoarseness of voice oral thrush  Patient to take albuterol inhaler 2 puffs every 6 on whenever necessary basis  Patient needs to titrate the oxygen to keep sats) 89 to 95% only if required  Patient has been given BiPAP for CLARISSE but patient has not been compliant with that and pros and cons discussed  Patient can be subjected to repeat PFT if the patient so desires but wants to defer it for now  Diet and lifestyle modification discussed  Cardiac medications as per cardiology  Patient is getting diuretics  Weight loss will help  Regular regimented exercise will help  Synthroid and PPI as per IM

## 2022-07-18 NOTE — PROGRESS NOTES
MA Communication:   The following orders are received by verbal communication from Carla Fraire MD    Orders include:    6 month

## 2022-07-18 NOTE — PATIENT INSTRUCTIONS
Remember to bring a list of pulmonary medications and any CPAP or BiPAP machines to your next appointment with the office. Please keep all of your future appointments scheduled by [address], [facility] Pulmonary office. Out of respect for other patients and providers, you may be asked to reschedule your appointment if you arrive later than your scheduled appointment time. Appointments cancelled less than 24hrs in advance will be considered a no show. Patients with three missed appointments within 1 year or four missed appointments within 2 years can be dismissed from the practice. Please be aware that our physicians are required to work in the Intensive Care Unit at [facility].  Your appointment may need to be rescheduled if they are designated to work during your appointment time. You may receive a survey regarding the care you received during your visit. Your input is valuable to us. We encourage you to complete and return your survey. We hope you will choose us in the future for your healthcare needs. Pt instructed of all future appointment dates & times, including radiology, labs, procedures & referrals. If procedures were scheduled preparation instructions provided. Instructions on future appointments with [facility] Pul[facility] were given.

## 2022-07-20 ENCOUNTER — ANTI-COAG VISIT (OUTPATIENT)
Dept: PHARMACY | Age: 72
End: 2022-07-20
Payer: COMMERCIAL

## 2022-07-20 DIAGNOSIS — I48.20 CHRONIC ATRIAL FIBRILLATION (HCC): Primary | ICD-10-CM

## 2022-07-20 LAB — INR BLD: 2.5

## 2022-07-20 PROCEDURE — 85610 PROTHROMBIN TIME: CPT | Performed by: PHARMACIST

## 2022-07-20 PROCEDURE — 99211 OFF/OP EST MAY X REQ PHY/QHP: CPT | Performed by: PHARMACIST

## 2022-07-20 NOTE — PROGRESS NOTES
is here for management of anticoagulation for AFib. PMH also significant for DM, Hyopthyroid, GERD, HTN, CAD, COPD. He presents today w/out complaint. Pt verifies dosing regimen as listed above. Pt denies s/s bleeding/bruising/swelling/SOB. No BRBPR. No melena. Reviewed pt medication list.  No changes in OTC/herbal medications. Reviewed dietary concerns. Pt states that he does not eat a lot of greens. No missed doses. Pt reports no changes since last visit. And appetite seems to be back to normal.    INR 2.5 is within the acceptable therapeutic range of 2-3. Recommend to continue dose of 7.5 mg every day. Patient has 5 mg and 2 mg tablets. Will continue to monitor and check INR in 4 weeks. Dosing reminder card given with phone number, appointment date, and time. Return to clinic: 08/17 @ 10:45 am     Mina Grewal, RobinD Student 7/20/2022 10:38 AM    I have seen the patient and reviewed the progress note written by the PharmD Candidate. I agree with this assessment and plan.    Huy [facility], PharmD 7/20/2022 11:00 AM

## 2022-07-25 ENCOUNTER — TELEPHONE (OUTPATIENT)
Dept: CARDIOLOGY CLINIC | Age: 72
End: 2022-07-25

## 2022-07-25 NOTE — TELEPHONE ENCOUNTER
Cholo Patel, 1950    Cardiac Risk Assessment    What type of procedure are you having? PENILE PROSTHESIS INSERTION    When is your procedure scheduled for?  8.24.22    Medications to be stopped. COUMADIN 5-DAYS PRIOR    What physician is performing your procedure? DR. Bakari Sam    Phone Number:   [phone]    Fax number to send the letter:   [phone]    Cardiologist:   TRISTEN    Last Appointment:   12.1.21  Assessment:     1. Hypertension : Stable and well controlled and will continue present medical regimen. 2. GERD (gastroesophageal reflux disease) : managed per PCP. 3. DM (diabetes mellitus)  : managed per PCP      4. Atrial fibrillation:  Mainstay of treatment is HR control and anticoagulation therapy. Note [facility] manages his coumadin therapy now (used to be Dr. Lisseth Hayes office). I have d/w him changing to NOAC but still wants coumadin at this time. Most recent EKG today AF 80bpm; IRBB, left axis deviation. No change from 2019 study. 5. CAD (coronary artery disease):  Mild non-obstructive CAD and will continue medical management. Most recent [facility] 3/15/19 LM: distal 40% shelf like lesion LAD: ERNESTO-2 sluggish flow, luminals LCX: small, luminals RCA: dominant, luminals LVEDP:15 LVEF=65%. There are no concerning symptoms for angina currently. 6.  Hyperlipidemia:  Most recent 8/2/21 I personally reviewed in Epic (see above). He had intolerance to Zocor therapy back in Jan 2013 with muscle cramping and CK was noted to 476. He continues Pravastatin 20mg qd and tolerates. His TG were 315 prior. He is watching diet now and most recent 8/21 WR=229. Plan:  1. Continue taking lasix 40 mg as needed for swelling, weight gain, or shortness of breath              -if weight gain of more than 2-3 pounds in a day or 3-5 pounds in a week.   2. Continue to follow up with Tejas Roca MD for routine labs     Follow up with me in 1 year     Cost of prescription medications and patient compliance have been reviewed with patient. All questions answered. Thank you for allowing me to participate in the care of this individual.     This note is scribed in the presence of Dr. Danyelle Frances by Jimmy Monae RN.     I, Dr. Barton Peabody, personally performed the services described in this documentation, as scribed by the above signed scribe in my presence. It is both accurate and complete to my knowledge. I agree with the details independently gathered by the clinical support staff, while the remaining scribed note accurately describes my personal service to the patient. Danyelle Frances M.D., [address] [facility]    Next Appointment:   12.2.22

## 2022-07-26 ENCOUNTER — OFFICE VISIT (OUTPATIENT)
Dept: FAMILY MEDICINE CLINIC | Age: 72
End: 2022-07-26
Payer: MEDICARE

## 2022-07-26 VITALS
WEIGHT: 223 LBS | BODY MASS INDEX: 32 KG/M2 | TEMPERATURE: 98 F | HEART RATE: 72 BPM | OXYGEN SATURATION: 94 % | SYSTOLIC BLOOD PRESSURE: 112 MMHG | DIASTOLIC BLOOD PRESSURE: 72 MMHG

## 2022-07-26 DIAGNOSIS — E03.9 ACQUIRED HYPOTHYROIDISM: Chronic | ICD-10-CM

## 2022-07-26 DIAGNOSIS — J45.40 MODERATE PERSISTENT ASTHMA WITHOUT COMPLICATION: ICD-10-CM

## 2022-07-26 DIAGNOSIS — G47.33 OSA (OBSTRUCTIVE SLEEP APNEA): ICD-10-CM

## 2022-07-26 DIAGNOSIS — Z79.4 TYPE 2 DIABETES MELLITUS WITH DIABETIC POLYNEUROPATHY, WITH LONG-TERM CURRENT USE OF INSULIN (HCC): Primary | ICD-10-CM

## 2022-07-26 DIAGNOSIS — Z98.890 S/P CARDIAC CATH: Chronic | ICD-10-CM

## 2022-07-26 DIAGNOSIS — E11.42 TYPE 2 DIABETES MELLITUS WITH DIABETIC POLYNEUROPATHY, WITH LONG-TERM CURRENT USE OF INSULIN (HCC): Primary | ICD-10-CM

## 2022-07-26 DIAGNOSIS — N52.01 ERECTILE DYSFUNCTION DUE TO ARTERIAL INSUFFICIENCY: ICD-10-CM

## 2022-07-26 PROBLEM — M25.571 RIGHT ANKLE PAIN: Status: RESOLVED | Noted: 2019-05-20 | Resolved: 2022-07-26

## 2022-07-26 PROCEDURE — G8427 DOCREV CUR MEDS BY ELIG CLIN: HCPCS | Performed by: FAMILY MEDICINE

## 2022-07-26 PROCEDURE — 3017F COLORECTAL CA SCREEN DOC REV: CPT | Performed by: FAMILY MEDICINE

## 2022-07-26 PROCEDURE — 1036F TOBACCO NON-USER: CPT | Performed by: FAMILY MEDICINE

## 2022-07-26 PROCEDURE — 1123F ACP DISCUSS/DSCN MKR DOCD: CPT | Performed by: FAMILY MEDICINE

## 2022-07-26 PROCEDURE — G8417 CALC BMI ABV UP PARAM F/U: HCPCS | Performed by: FAMILY MEDICINE

## 2022-07-26 PROCEDURE — 93000 ELECTROCARDIOGRAM COMPLETE: CPT | Performed by: FAMILY MEDICINE

## 2022-07-26 PROCEDURE — 2022F DILAT RTA XM EVC RTNOPTHY: CPT | Performed by: FAMILY MEDICINE

## 2022-07-26 PROCEDURE — 99213 OFFICE O/P EST LOW 20 MIN: CPT | Performed by: FAMILY MEDICINE

## 2022-07-26 PROCEDURE — 3044F HG A1C LEVEL LT 7.0%: CPT | Performed by: FAMILY MEDICINE

## 2022-07-26 ASSESSMENT — ENCOUNTER SYMPTOMS
ABDOMINAL PAIN: 0
EYE PAIN: 0
SHORTNESS OF BREATH: 0
BLOOD IN STOOL: 0
WHEEZING: 0

## 2022-07-26 NOTE — TELEPHONE ENCOUNTER
Dwaine for patient to call for clearance risk information. Letter created and sent to Dr. Leanna Zeng.

## 2022-07-26 NOTE — PATIENT INSTRUCTIONS
Please read the healthy family handout that you were given and share it with your family. Please compare this printed medication list with your medications at home to be sure they are the same. If you have any medications that are different please contact us immediately at [phone]. Also review your allergies that we have listed, these may also include medications that you have not been able to tolerate, make sure everything listed is correct. If you have any allergies that are different please contact us immediately at [phone]. You may receive a survey in the mail or by email asking about your experience during your visit today. Please complete and return to us so we know how we are serving you.

## 2022-07-28 ENCOUNTER — TELEPHONE (OUTPATIENT)
Dept: PULMONOLOGY | Age: 72
End: 2022-07-28

## 2022-07-28 NOTE — TELEPHONE ENCOUNTER
[facility] Brand name advair is preferred by the patient's insurance. Approval given to change the script to the brand name medication.

## 2022-08-04 LAB
CATARACTS: NEGATIVE
DIABETIC RETINOPATHY: NEGATIVE
GLAUCOMA: NEGATIVE
INTRAOCULAR PRESSURE EYE: NORMAL
VISUAL ACUITY DISTANCE LEFT EYE: NORMAL
VISUAL ACUITY DISTANCE RIGHT EYE: NORMAL

## 2022-08-12 ENCOUNTER — HOSPITAL ENCOUNTER (OUTPATIENT)
Age: 72
Discharge: HOME | End: 2022-08-12
Payer: COMMERCIAL

## 2022-08-12 LAB
A/G RATIO: 1.3 (ref 1.1–2.2)
ALBUMIN SERPL-MCNC: 3.9 G/DL (ref 3.4–5)
ALP BLD-CCNC: 67 U/L (ref 40–129)
ALT SERPL-CCNC: 15 U/L (ref 10–40)
ANION GAP SERPL CALCULATED.3IONS-SCNC: 11 MMOL/L (ref 3–16)
AST SERPL-CCNC: 19 U/L (ref 15–37)
BILIRUB SERPL-MCNC: 0.4 MG/DL (ref 0–1)
BUN BLDV-MCNC: 20 MG/DL (ref 7–20)
CALCIUM SERPL-MCNC: 9.2 MG/DL (ref 8.3–10.6)
CHLORIDE BLD-SCNC: 104 MMOL/L (ref 99–110)
CHOLESTEROL, TOTAL: 125 MG/DL (ref 0–199)
CO2: 26 MMOL/L (ref 21–32)
CREAT SERPL-MCNC: 1.1 MG/DL (ref 0.8–1.3)
CREATININE URINE: 112 MG/DL (ref 39–259)
GFR AFRICAN AMERICAN: >60
GFR NON-AFRICAN AMERICAN: >60
GLUCOSE BLD-MCNC: 128 MG/DL (ref 70–99)
HDLC SERPL-MCNC: 31 MG/DL (ref 40–60)
LDL CHOLESTEROL CALCULATED: 52 MG/DL
MICROALBUMIN UR-MCNC: <1.2 MG/DL
MICROALBUMIN/CREAT UR-RTO: NORMAL MG/G (ref 0–30)
POTASSIUM SERPL-SCNC: 4.9 MMOL/L (ref 3.5–5.1)
SODIUM BLD-SCNC: 141 MMOL/L (ref 136–145)
T3 FREE: 2.7 PG/ML (ref 2.3–4.2)
T4 FREE: 1.2 NG/DL (ref 0.9–1.8)
TOTAL PROTEIN: 6.9 G/DL (ref 6.4–8.2)
TRIGL SERPL-MCNC: 212 MG/DL (ref 0–150)
TSH SERPL DL<=0.05 MIU/L-ACNC: 1.75 UIU/ML (ref 0.27–4.2)
VLDLC SERPL CALC-MCNC: 42 MG/DL

## 2022-08-12 PROCEDURE — 80053 COMPREHEN METABOLIC PANEL: CPT

## 2022-08-12 PROCEDURE — 36415 COLL VENOUS BLD VENIPUNCTURE: CPT

## 2022-08-12 PROCEDURE — 83036 HEMOGLOBIN GLYCOSYLATED A1C: CPT

## 2022-08-12 PROCEDURE — 82043 UR ALBUMIN QUANTITATIVE: CPT

## 2022-08-12 PROCEDURE — 84443 ASSAY THYROID STIM HORMONE: CPT

## 2022-08-12 PROCEDURE — 84439 ASSAY OF FREE THYROXINE: CPT

## 2022-08-12 PROCEDURE — 80061 LIPID PANEL: CPT

## 2022-08-12 PROCEDURE — 82570 ASSAY OF URINE CREATININE: CPT

## 2022-08-12 PROCEDURE — 84481 FREE ASSAY (FT-3): CPT

## 2022-08-13 LAB
ESTIMATED AVERAGE GLUCOSE: 128.4 MG/DL
HBA1C MFR BLD: 6.1 %

## 2022-08-17 ENCOUNTER — ANTI-COAG VISIT (OUTPATIENT)
Dept: PHARMACY | Age: 72
End: 2022-08-17
Payer: COMMERCIAL

## 2022-08-17 DIAGNOSIS — I48.20 CHRONIC ATRIAL FIBRILLATION (HCC): Primary | ICD-10-CM

## 2022-08-17 LAB — INR BLD: 2.7

## 2022-08-17 PROCEDURE — 99211 OFF/OP EST MAY X REQ PHY/QHP: CPT

## 2022-08-17 PROCEDURE — 85610 PROTHROMBIN TIME: CPT

## 2022-08-17 NOTE — PROGRESS NOTES
is here for management of anticoagulation for AFib. PMH also significant for DM, Hyopthyroid, GERD, HTN, CAD, COPD. He presents today w/out complaint. Pt verifies dosing regimen as listed above. Pt denies s/s bleeding/bruising/swelling/SOB. No BRBPR. No melena. Reviewed pt medication list.  No changes in OTC/herbal medications. Reviewed dietary concerns. Pt states that he does not eat a lot of greens. No missed doses. Patient reports procedure on the 24th will be stopping warfarin on Friday. Will see patient around 1 week after procedure. Otherwise no changes reported. INR 2.7 is within the acceptable therapeutic range of 2-3. Recommend to continue dose of 7.5 mg every day. Patient has 5 mg and 2 mg tablets. Will continue to monitor and check INR in 4 weeks. Dosing reminder card given with phone number, appointment date, and time.   Return to clinic: 08/31 @ 10:45 am     Yajaira Lin, RobinD Candidate 8/17/2022 10:45 AM

## 2022-08-22 NOTE — PROGRESS NOTES
1. Do not eat or drink anything after 12 midnight prior to surgery. This includes no water, chewing gum mints, or ice chips. You may brush your teeth and gargle the day of surgery but DO NOT SWALLOW THE WATER. 2. Please see your family doctor/pediatrician for a history and physical and/or concerning medications. Bring any test results/reports from your physician's office. If you are under the care of a heart doctor or specialist please be aware that you may be asked to see him or her for clearance. 3. You may be asked to stop blood thinners such as Coumadin, Plavix, Fragmin, and Lovenox or Anti-inflammatories such as Aspirin, Ibuprofen, Advil, and Naproxen prior to your surgery. Please check with your doctor before stopping these or any other medications. 4. Do not smoke, and do not drink any alcoholic beverages 24 hours prior to surgery. 5. You MUST make arrangements for a responsible adult to take you home after your surgery. For your safety, you will not be allowed to leave alone or drive yourself home. Your surgery will be cancelled if you do not have a ride home. Also for your safety, it is strongly suggested someone stay with you the first 24 hrs after your surgery. 6. A parent/legal guardian must accompany a child scheduled for surgery and plan to stay at the hospital until the child is discharged. Please do not bring other children with you. 7. For your comfort,please wear simple, loose fitting clothing to the hospital.  Please do not bring valuables (money, credit cards, checkbooks, etc.) Do not wear any makeup (including no eye makeup) or nail polish on your fingers or toes. 8. For your safety, please DO NOT wear any jewelry or piercings on day of surgery. All body piercing jewelry must be removed. 9. If you have dentures, they will be removed before going to the OR; for your convenience we will provide you with a container.   If you wear contact lenses or glasses, they will be removed, they will be removed, please bring a case for them. 10. If appicable,Please see your family doctor/pediatrician for a history & physical and/or concerning medications. Bring any test results/reports from your physician's office. 11. Remember to bring Blood Bank bracelet to the hospital on the day of surgery. 12. If you have a Living Will and Durable Power of  for Healthcare, please bring in a copy. 15. Notify your Surgeon if you develop any illness between now and surgery  time, cough, cold, fever, sore throat, nausea, vomiting, etc.  Please notify your surgeon if you experience dizziness, shortness of breath or blurred vision between now & the time of your surgery   14. DO NOT shave your operative site 96 hours prior to surgery. For face & neck surgery, men may use an electric razor 48 hours prior to surgery. 15. Shower the night before surgery with ___Antibacterial soap __X_Hibiclens PER SURGEON INSTRUCTIONS   16. To provide excellent care visitors will be limited to one in the room at any given time. 17.  Please bring picture ID and insurance card. 18.  Visit our web site for additional information:  [email or web address].           INSTRUCTED TO HOLD LISINOPRIL, JARDIANCE AND INSULIN DOS PER ANESTHESIA REQUEST AND TO TAKE METOPROLOL AM OF SURGERY WITH SMALL SIP OF WATER

## 2022-08-24 ENCOUNTER — ANESTHESIA (OUTPATIENT)
Dept: OPERATING ROOM | Age: 72
End: 2022-08-24
Payer: COMMERCIAL

## 2022-08-24 ENCOUNTER — ANESTHESIA EVENT (OUTPATIENT)
Dept: OPERATING ROOM | Age: 72
End: 2022-08-24
Payer: COMMERCIAL

## 2022-08-24 ENCOUNTER — HOSPITAL ENCOUNTER (OUTPATIENT)
Age: 72
Setting detail: OUTPATIENT SURGERY
Discharge: HOME | End: 2022-08-24
Attending: UROLOGY | Admitting: UROLOGY
Payer: COMMERCIAL

## 2022-08-24 VITALS
TEMPERATURE: 97.3 F | SYSTOLIC BLOOD PRESSURE: 120 MMHG | HEART RATE: 75 BPM | RESPIRATION RATE: 20 BRPM | BODY MASS INDEX: 31.21 KG/M2 | DIASTOLIC BLOOD PRESSURE: 75 MMHG | HEIGHT: 70 IN | WEIGHT: 218 LBS | OXYGEN SATURATION: 97 %

## 2022-08-24 DIAGNOSIS — N52.9 ERECTILE DYSFUNCTION, UNSPECIFIED ERECTILE DYSFUNCTION TYPE: Primary | ICD-10-CM

## 2022-08-24 LAB
GLUCOSE BLD-MCNC: 100 MG/DL (ref 70–99)
HCT VFR BLD CALC: 50.4 % (ref 40.5–52.5)
HEMOGLOBIN: 17 G/DL (ref 13.5–17.5)
INR BLD: 1.18 (ref 0.87–1.14)
MCH RBC QN AUTO: 31.1 PG (ref 26–34)
MCHC RBC AUTO-ENTMCNC: 33.7 G/DL (ref 31–36)
MCV RBC AUTO: 92.2 FL (ref 80–100)
PDW BLD-RTO: 13.4 % (ref 12.4–15.4)
PERFORMED ON: ABNORMAL
PLATELET # BLD: 217 K/UL (ref 135–450)
PMV BLD AUTO: 7.4 FL (ref 5–10.5)
PROTHROMBIN TIME: 14.9 SEC (ref 11.7–14.5)
RBC # BLD: 5.47 M/UL (ref 4.2–5.9)
WBC # BLD: 12.8 K/UL (ref 4–11)

## 2022-08-24 PROCEDURE — C1813 PROSTHESIS, PENILE, INFLATAB: HCPCS | Performed by: UROLOGY

## 2022-08-24 PROCEDURE — 3600000014 HC SURGERY LEVEL 4 ADDTL 15MIN: Performed by: UROLOGY

## 2022-08-24 PROCEDURE — 85027 COMPLETE CBC AUTOMATED: CPT

## 2022-08-24 PROCEDURE — 2780000010 HC IMPLANT OTHER: Performed by: UROLOGY

## 2022-08-24 PROCEDURE — 7100000000 HC PACU RECOVERY - FIRST 15 MIN: Performed by: UROLOGY

## 2022-08-24 PROCEDURE — 3700000001 HC ADD 15 MINUTES (ANESTHESIA): Performed by: UROLOGY

## 2022-08-24 PROCEDURE — 3700000000 HC ANESTHESIA ATTENDED CARE: Performed by: UROLOGY

## 2022-08-24 PROCEDURE — 7100000011 HC PHASE II RECOVERY - ADDTL 15 MIN: Performed by: UROLOGY

## 2022-08-24 PROCEDURE — 6360000002 HC RX W HCPCS

## 2022-08-24 PROCEDURE — 3600000004 HC SURGERY LEVEL 4 BASE: Performed by: UROLOGY

## 2022-08-24 PROCEDURE — A4217 STERILE WATER/SALINE, 500 ML: HCPCS | Performed by: UROLOGY

## 2022-08-24 PROCEDURE — 2580000003 HC RX 258

## 2022-08-24 PROCEDURE — 2500000003 HC RX 250 WO HCPCS: Performed by: UROLOGY

## 2022-08-24 PROCEDURE — 2500000003 HC RX 250 WO HCPCS

## 2022-08-24 PROCEDURE — 85610 PROTHROMBIN TIME: CPT

## 2022-08-24 PROCEDURE — 7100000010 HC PHASE II RECOVERY - FIRST 15 MIN: Performed by: UROLOGY

## 2022-08-24 PROCEDURE — 2709999900 HC NON-CHARGEABLE SUPPLY: Performed by: UROLOGY

## 2022-08-24 PROCEDURE — 6360000002 HC RX W HCPCS: Performed by: ANESTHESIOLOGY

## 2022-08-24 PROCEDURE — 2580000003 HC RX 258: Performed by: UROLOGY

## 2022-08-24 PROCEDURE — 7100000001 HC PACU RECOVERY - ADDTL 15 MIN: Performed by: UROLOGY

## 2022-08-24 PROCEDURE — 6360000002 HC RX W HCPCS: Performed by: UROLOGY

## 2022-08-24 DEVICE — AMS 700 PENILE PROSTHESIS, 1 LOW PROFILE RESERVOIR, UP TO 100 ML, INHIBIZONE TREATED
Type: IMPLANTABLE DEVICE | Site: PENIS | Status: FUNCTIONAL
Brand: CONCEAL

## 2022-08-24 DEVICE — INFLATABLE PENILE PROSTHESIS WITH MS PUMP ACCESSORY KIT
Type: IMPLANTABLE DEVICE | Site: PENIS | Status: FUNCTIONAL
Brand: AMS 700 ACCESSORY KIT

## 2022-08-24 DEVICE — NON-STACKABLE REAR TIP EXTENDERS FOR AMS 700 CX AND LGX CYLINDERS
Type: IMPLANTABLE DEVICE | Site: PENIS | Status: FUNCTIONAL
Brand: REAR TIP EXTENDER

## 2022-08-24 DEVICE — INFLATABLE PENILE PROSTHESIS, INHIBIZONE/PRECONNECTED - INFRAPUBIC 1 PUMP 2 CYLINDERS
Type: IMPLANTABLE DEVICE | Site: PENIS | Status: FUNCTIONAL
Brand: AMS 700 CX MS PUMP

## 2022-08-24 RX ORDER — ONDANSETRON 2 MG/ML
INJECTION INTRAMUSCULAR; INTRAVENOUS PRN
Status: DISCONTINUED | OUTPATIENT
Start: 2022-08-24 | End: 2022-08-24 | Stop reason: SDUPTHER

## 2022-08-24 RX ORDER — OXYCODONE HYDROCHLORIDE 5 MG/1
5 TABLET ORAL PRN
Status: DISCONTINUED | OUTPATIENT
Start: 2022-08-24 | End: 2022-08-24 | Stop reason: HOSPADM

## 2022-08-24 RX ORDER — ONDANSETRON 2 MG/ML
4 INJECTION INTRAMUSCULAR; INTRAVENOUS
Status: DISCONTINUED | OUTPATIENT
Start: 2022-08-24 | End: 2022-08-24 | Stop reason: HOSPADM

## 2022-08-24 RX ORDER — LABETALOL HYDROCHLORIDE 5 MG/ML
5 INJECTION, SOLUTION INTRAVENOUS EVERY 10 MIN PRN
Status: DISCONTINUED | OUTPATIENT
Start: 2022-08-24 | End: 2022-08-24 | Stop reason: HOSPADM

## 2022-08-24 RX ORDER — FENTANYL CITRATE 50 UG/ML
INJECTION, SOLUTION INTRAMUSCULAR; INTRAVENOUS PRN
Status: DISCONTINUED | OUTPATIENT
Start: 2022-08-24 | End: 2022-08-24 | Stop reason: SDUPTHER

## 2022-08-24 RX ORDER — PROPOFOL 10 MG/ML
INJECTION, EMULSION INTRAVENOUS PRN
Status: DISCONTINUED | OUTPATIENT
Start: 2022-08-24 | End: 2022-08-24 | Stop reason: SDUPTHER

## 2022-08-24 RX ORDER — SODIUM CHLORIDE 0.9 % (FLUSH) 0.9 %
5-40 SYRINGE (ML) INJECTION EVERY 12 HOURS SCHEDULED
Status: DISCONTINUED | OUTPATIENT
Start: 2022-08-24 | End: 2022-08-24 | Stop reason: HOSPADM

## 2022-08-24 RX ORDER — DOCUSATE SODIUM 100 MG/1
100 CAPSULE, LIQUID FILLED ORAL 2 TIMES DAILY
Qty: 60 CAPSULE | Refills: 0 | Status: SHIPPED | OUTPATIENT
Start: 2022-08-24 | End: 2022-09-23

## 2022-08-24 RX ORDER — SULFAMETHOXAZOLE AND TRIMETHOPRIM 800; 160 MG/1; MG/1
1 TABLET ORAL 2 TIMES DAILY
Qty: 14 TABLET | Refills: 0 | Status: SHIPPED | OUTPATIENT
Start: 2022-08-24 | End: 2022-08-31

## 2022-08-24 RX ORDER — EPHEDRINE SULFATE 50 MG/ML
INJECTION, SOLUTION INTRAVENOUS PRN
Status: DISCONTINUED | OUTPATIENT
Start: 2022-08-24 | End: 2022-08-24 | Stop reason: SDUPTHER

## 2022-08-24 RX ORDER — SODIUM CHLORIDE, SODIUM LACTATE, POTASSIUM CHLORIDE, CALCIUM CHLORIDE 600; 310; 30; 20 MG/100ML; MG/100ML; MG/100ML; MG/100ML
INJECTION, SOLUTION INTRAVENOUS CONTINUOUS PRN
Status: DISCONTINUED | OUTPATIENT
Start: 2022-08-24 | End: 2022-08-24 | Stop reason: SDUPTHER

## 2022-08-24 RX ORDER — DIPHENHYDRAMINE HYDROCHLORIDE 50 MG/ML
12.5 INJECTION INTRAMUSCULAR; INTRAVENOUS
Status: DISCONTINUED | OUTPATIENT
Start: 2022-08-24 | End: 2022-08-24 | Stop reason: HOSPADM

## 2022-08-24 RX ORDER — SODIUM CHLORIDE 0.9 % (FLUSH) 0.9 %
5-40 SYRINGE (ML) INJECTION PRN
Status: DISCONTINUED | OUTPATIENT
Start: 2022-08-24 | End: 2022-08-24 | Stop reason: HOSPADM

## 2022-08-24 RX ORDER — ROCURONIUM BROMIDE 10 MG/ML
INJECTION, SOLUTION INTRAVENOUS PRN
Status: DISCONTINUED | OUTPATIENT
Start: 2022-08-24 | End: 2022-08-24 | Stop reason: SDUPTHER

## 2022-08-24 RX ORDER — OXYCODONE HYDROCHLORIDE 5 MG/1
10 TABLET ORAL PRN
Status: DISCONTINUED | OUTPATIENT
Start: 2022-08-24 | End: 2022-08-24 | Stop reason: HOSPADM

## 2022-08-24 RX ORDER — SODIUM CHLORIDE 9 MG/ML
INJECTION, SOLUTION INTRAVENOUS PRN
Status: DISCONTINUED | OUTPATIENT
Start: 2022-08-24 | End: 2022-08-24 | Stop reason: HOSPADM

## 2022-08-24 RX ORDER — OXYCODONE HYDROCHLORIDE 5 MG/1
5 TABLET ORAL EVERY 6 HOURS PRN
Qty: 12 TABLET | Refills: 0 | Status: SHIPPED | OUTPATIENT
Start: 2022-08-24 | End: 2022-08-27

## 2022-08-24 RX ORDER — FLUCONAZOLE 2 MG/ML
400 INJECTION, SOLUTION INTRAVENOUS ONCE
Status: COMPLETED | OUTPATIENT
Start: 2022-08-24 | End: 2022-08-24

## 2022-08-24 RX ORDER — LIDOCAINE HYDROCHLORIDE 10 MG/ML
INJECTION, SOLUTION INFILTRATION; PERINEURAL PRN
Status: DISCONTINUED | OUTPATIENT
Start: 2022-08-24 | End: 2022-08-24 | Stop reason: SDUPTHER

## 2022-08-24 RX ORDER — MEPERIDINE HYDROCHLORIDE 50 MG/ML
12.5 INJECTION INTRAMUSCULAR; INTRAVENOUS; SUBCUTANEOUS EVERY 5 MIN PRN
Status: DISCONTINUED | OUTPATIENT
Start: 2022-08-24 | End: 2022-08-24 | Stop reason: HOSPADM

## 2022-08-24 RX ORDER — GLYCOPYRROLATE 0.2 MG/ML
INJECTION INTRAMUSCULAR; INTRAVENOUS PRN
Status: DISCONTINUED | OUTPATIENT
Start: 2022-08-24 | End: 2022-08-24 | Stop reason: SDUPTHER

## 2022-08-24 RX ADMIN — SODIUM CHLORIDE, SODIUM LACTATE, POTASSIUM CHLORIDE, AND CALCIUM CHLORIDE: .6; .31; .03; .02 INJECTION, SOLUTION INTRAVENOUS at 08:03

## 2022-08-24 RX ADMIN — PROPOFOL 20 MG: 10 INJECTION, EMULSION INTRAVENOUS at 10:18

## 2022-08-24 RX ADMIN — GENTAMICIN SULFATE 100 MG: 40 INJECTION, SOLUTION INTRAMUSCULAR; INTRAVENOUS at 08:42

## 2022-08-24 RX ADMIN — PHENYLEPHRINE HYDROCHLORIDE 200 MCG: 10 INJECTION INTRAVENOUS at 08:37

## 2022-08-24 RX ADMIN — PHENYLEPHRINE HYDROCHLORIDE 200 MCG: 10 INJECTION INTRAVENOUS at 08:28

## 2022-08-24 RX ADMIN — EPHEDRINE SULFATE 10 MG: 50 INJECTION INTRAMUSCULAR; INTRAVENOUS; SUBCUTANEOUS at 09:06

## 2022-08-24 RX ADMIN — PROPOFOL 120 MG: 10 INJECTION, EMULSION INTRAVENOUS at 08:13

## 2022-08-24 RX ADMIN — PHENYLEPHRINE HYDROCHLORIDE 100 MCG: 10 INJECTION INTRAVENOUS at 09:36

## 2022-08-24 RX ADMIN — FLUCONAZOLE 400 MG: 2 INJECTION INTRAVENOUS at 09:42

## 2022-08-24 RX ADMIN — PHENYLEPHRINE HYDROCHLORIDE 200 MCG: 10 INJECTION INTRAVENOUS at 09:09

## 2022-08-24 RX ADMIN — PHENYLEPHRINE HYDROCHLORIDE 100 MCG: 10 INJECTION INTRAVENOUS at 10:11

## 2022-08-24 RX ADMIN — GLYCOPYRROLATE 0.2 MG: 0.2 INJECTION, SOLUTION INTRAMUSCULAR; INTRAVENOUS at 09:12

## 2022-08-24 RX ADMIN — ROCURONIUM BROMIDE 50 MG: 10 SOLUTION INTRAVENOUS at 08:13

## 2022-08-24 RX ADMIN — EPHEDRINE SULFATE 5 MG: 50 INJECTION INTRAMUSCULAR; INTRAVENOUS; SUBCUTANEOUS at 08:23

## 2022-08-24 RX ADMIN — ROCURONIUM BROMIDE 20 MG: 10 SOLUTION INTRAVENOUS at 08:41

## 2022-08-24 RX ADMIN — SUGAMMADEX 200 MG: 100 INJECTION, SOLUTION INTRAVENOUS at 10:22

## 2022-08-24 RX ADMIN — PHENYLEPHRINE HYDROCHLORIDE 100 MCG: 10 INJECTION INTRAVENOUS at 08:20

## 2022-08-24 RX ADMIN — HYDROMORPHONE HYDROCHLORIDE 0.5 MG: 1 INJECTION, SOLUTION INTRAMUSCULAR; INTRAVENOUS; SUBCUTANEOUS at 11:35

## 2022-08-24 RX ADMIN — ONDANSETRON 4 MG: 2 INJECTION INTRAMUSCULAR; INTRAVENOUS at 09:57

## 2022-08-24 RX ADMIN — LIDOCAINE HYDROCHLORIDE 50 MG: 10 INJECTION, SOLUTION INFILTRATION; PERINEURAL at 08:13

## 2022-08-24 RX ADMIN — ROCURONIUM BROMIDE 10 MG: 10 SOLUTION INTRAVENOUS at 09:01

## 2022-08-24 RX ADMIN — ROCURONIUM BROMIDE 10 MG: 10 SOLUTION INTRAVENOUS at 09:28

## 2022-08-24 RX ADMIN — SODIUM CHLORIDE, SODIUM LACTATE, POTASSIUM CHLORIDE, AND CALCIUM CHLORIDE: .6; .31; .03; .02 INJECTION, SOLUTION INTRAVENOUS at 10:11

## 2022-08-24 RX ADMIN — PHENYLEPHRINE HYDROCHLORIDE 100 MCG: 10 INJECTION INTRAVENOUS at 08:22

## 2022-08-24 RX ADMIN — PHENYLEPHRINE HYDROCHLORIDE 100 MCG: 10 INJECTION INTRAVENOUS at 09:27

## 2022-08-24 RX ADMIN — FENTANYL CITRATE 50 MCG: 50 INJECTION INTRAMUSCULAR; INTRAVENOUS at 08:13

## 2022-08-24 RX ADMIN — PIPERACILLIN AND TAZOBACTAM 3375 MG: 3; .375 INJECTION, POWDER, FOR SOLUTION INTRAVENOUS at 08:18

## 2022-08-24 RX ADMIN — FENTANYL CITRATE 50 MCG: 50 INJECTION INTRAMUSCULAR; INTRAVENOUS at 10:19

## 2022-08-24 ASSESSMENT — PAIN DESCRIPTION - ONSET: ONSET: ON-GOING

## 2022-08-24 ASSESSMENT — PAIN DESCRIPTION - FREQUENCY: FREQUENCY: INTERMITTENT

## 2022-08-24 ASSESSMENT — PAIN - FUNCTIONAL ASSESSMENT
PAIN_FUNCTIONAL_ASSESSMENT: ACTIVITIES ARE NOT PREVENTED
PAIN_FUNCTIONAL_ASSESSMENT: NONE - DENIES PAIN

## 2022-08-24 ASSESSMENT — PAIN SCALES - GENERAL: PAINLEVEL_OUTOF10: 5

## 2022-08-24 ASSESSMENT — PAIN DESCRIPTION - LOCATION: LOCATION: PENIS

## 2022-08-24 ASSESSMENT — PAIN DESCRIPTION - PAIN TYPE: TYPE: ACUTE PAIN;SURGICAL PAIN

## 2022-08-24 ASSESSMENT — PAIN DESCRIPTION - DESCRIPTORS: DESCRIPTORS: BURNING

## 2022-08-24 NOTE — DISCHARGE INSTRUCTIONS
At Home Instructions     Restart your warfarin blood thinner next Tuesday Aug 30    You have just undergone placement of a penile prosthesis. This  information sheet is designed to answer any basic questions you may have about your care at  home as well as provide contact information. What to Expect    - The goals of your hospital stay after surgery are to provide you with intravenous  antibiotics. At the time of discharge, the catheter is in place and a drain. I will see you Friday to remove these    Activities    - Driving - it is okay to drive 24 hours after your last prescription pain medication  - Lifting - do not lift more than 15 pounds or participating in heavy physical activity for 4  weeks. - Stairs - no restrictions    Pain Control/Medications    - Antibiotics - take the prescribed antibiotic for 7 days  - Narcotics - use only if needed. As with all other narcotic medications, you cannot drive or operate heavy machinery within 24 hours of use. Lastly,  AVOID ALL ALCOHOL INTAKE while on this medication.  - Stool Softeners - narcotics can be constipating, so we recommend any over-the-counter  stool softener (ie, Colace®, Dulcolax®) available from your local pharmacy. Use Tylenol 1000mg and Motrin 600mg scheduled every 6 hours for pain for the first few days   For example, take Tylenol at noon, then Motrin at 3pm, then Tylenol at 6pm, then Motrin at 9pm and so on and so forth    Wound Care/Cleaning    - At the time of discharge, you may shower. However, please avoid baths, swimming  pools, or hot tubs for 4 weeks thereafter.  - You have one or two incisions, one in the scrotum and in certain cases in your groin area. The sutures  closing them will slowly break down on their own, and you do not need to return for their  removal. No specific antibiotic ointment/salve is recommended.     Try to avoid touching the incision in the scrotum - there is no need to manipulate the pump - let this heal and we will assess at the 6 week ivania    Follow up    We will see you back in 6 weeks to activate the device - see you Friday to remove hill and drain      Glenny Baxter MD  [facility]  Office - [phone]  [address] [place] [address] are under the influence of drugs- do not drink alcohol, drive a car, operate machinery(such as power tools, kitchen appliances, etc), sign legal documents, or make any important decisions for 24 hours (or while on pain medications). Children should not ride bikes or [place] or play on gym sets  for 24 hours after surgery. A responsible adult should be with you for 24 hours. Rest at home today- increase activity as tolerated. Progress slowly to a regular diet unless your physician has instructed you otherwise. Drink plenty of water. CALL YOUR DOCTOR IF YOU:  Have moderate to severe nausea or vomiting AND are unable to hold down fluids or prescribed medications. Have bright red bloody drainage from your dressing that won't stop oozing. Do not get relief with your pain medication    NORMAL (POSSIBLE) SIDE EFFECTS FROM ANESTHESIA:     Confusion, temporary memory loss, delayed reaction times in the first 24 hours  Lightheadedness, dizziness, difficulty focusing, blurred vision  Nausea/vomiting can happen  Shivering, feeling cold, sore throat, cough and muscle aches should stop within 24-48 hours  Trouble urinating - call your surgeon if it has been more than 8 hrs  Bruising or soreness at the IV site - call if it remains red, firm or there is drainage             FEMALES OF CHILDBEARING AGE WHO ARE TAKING BIRTH CONTROL PILLS:  You may have received a medication during your procedure that interferes with the   actions of birth control pills (Bridion or Emend). Use some other kind of birth control in addition to your pills, like a condom, for 1 month after your procedure to prevent unwanted pregnancy.     The following instructions are to be followed if you have a

## 2022-08-24 NOTE — ANESTHESIA PRE PROCEDURE
Department of Anesthesiology  Preprocedure Note       Name:  Radha Harrison   Age:  81 y.o.  :  1950                                          MRN:  [id number]         Date:  2022      Surgeon: Boom Reinoso):  Reji Perez MD    Procedure: Procedure(s):  MULTI COMPONENT PENILE PROSTHESIS INSERTION              [employer]    Medications prior to admission:   Prior to Admission medications    Medication Sig Start Date End Date Taking? Authorizing Provider   fluticasone-salmeterol (ADVAIR DISKUS) 250-50 MCG/ACT AEPB diskus inhaler Inhale 1 puff into the lungs in the morning and 1 puff in the evening. Rinse mouth with water after use. 22   Stephanie Pastor MD   albuterol sulfate HFA (PROAIR HFA) 108 (90 Base) MCG/ACT inhaler Inhale 2 puffs into the lungs every 6 hours as needed for Wheezing or Shortness of Breath 22   Stephanie Pastor MD   tadalafil (CIALIS) 20 MG tablet Take 1 tablet by mouth daily as needed for Erectile Dysfunction 22   Kareem Khalil MD   lisinopril (PRINIVIL;ZESTRIL) 10 MG tablet TAKE 1 TABLET BY MOUTH  DAILY 22   Star Ayala MD   fluticasone-salmeterol (ADVAIR DISKUS) 250-50 MCG/DOSE AEPB USE 1 INHALATION TWO TIMES  DAILY 22   Stephanie Pastor MD   metoprolol succinate (TOPROL XL) 50 MG extended release tablet TAKE ONE TABLET BY  MOUTH DAILY 21   Star Ayala MD   pravastatin (PRAVACHOL) 20 MG tablet TAKE 1 TABLET BY MOUTH  DAILY 21   Brian Gómez MD   ipratropium-albuterol (DUONEB) 0.5-2.5 (3) MG/3ML SOLN nebulizer solution Inhale 3 mLs into the lungs every 6 hours as needed for Shortness of Breath 21   ESME Hernandez - CNP   pantoprazole (PROTONIX) 40 MG tablet TAKE 1 TABLET BY MOUTH  DAILY 10/21/21   Kareem Khalil MD   warfarin (COUMADIN) 10 MG tablet Take 1 tablet by mouth daily Every day except Wednesday.  21   Kareem Khalil MD   fenofibrate (TRICOR) 54 MG tablet TAKE 1 TABLET BY MOUTH  DAILY 21   Ady Layer Lauren Vidales MD   insulin glargine (LANTUS SOLOSTAR) 100 UNIT/ML injection pen 50 Units 2 times daily  12/9/20   Historical Provider, MD   levothyroxine (SYNTHROID) 75 MCG tablet TAKE 1 TABLET BY MOUTH  DAILY 11/5/20   Kiet Morris MD   TRULICITY 1.5 OF/1.2KO SOPN Inject 1.5 mg into the skin once a week 12/24/19   Historical Provider, MD   doxazosin (CARDURA) 2 MG tablet Take 0.5 tablets by mouth daily (dose reduction)  Patient taking differently: Take 1 mg by mouth daily (dose reduction) 1 mg 8/19/19   Kiet Morris MD   JARDIANCE 25 MG tablet Take 10 mg by mouth daily 9/6/17   Historical Provider, MD   glucose blood VI test strips (CRUZITO CONTOUR NEXT TEST) strip Testing 4 x a day DX: E11.42 9/25/17   Kiet Morris MD   fluticasone Jean-Pierre Soto) 50 MCG/ACT nasal spray 2 sprays by Nasal route daily 7/13/15   Renetta Madden MD   Insulin Pen Needle (NOVOFINE) 30G X 8 MM MISC APPLY 1 EACH TOPICALLY 2 TIMES DAILY. 7/7/15   Kiet Morris MD       Current medications:    Current Facility-Administered Medications   Medication Dose Route Frequency Provider Last Rate Last Admin    gentamicin (GARAMYCIN) 100 mg in dextrose 5 % 100 mL IVPB  100 mg IntraVENous On Call to Diaz. #5 Giovana Narayan MD        piperacillin-tazobactam (ZOSYN) 3,375 mg in dextrose 5 % 50 mL IVPB extended infusion (mini-bag)  3,375 mg IntraVENous On Call to Diaz. #5 Giovana Narayan MD           Allergies:     Allergies   Allergen Reactions    Lyrica [Pregabalin] Swelling    Metoclopramide Hives     tremor  tremor  tremor    Simvastatin Hives     Increased CPK  Increased CPK  Increased CPK    Amlodipine Other (See Comments)     Feet edema       Problem List:    Patient Active Problem List   Diagnosis Code    Erectile dysfunction N52.9    Chronic back pain M54.9, G89.29    GERD (gastroesophageal reflux disease) K21.9    CLARISSE (obstructive sleep apnea) G47.33    Hypotestosteronism E34.9    Pulmonary nodule R91.1    S/P cardiac cath: non-obstructive CAD Z98.890    Vitamin D deficiency E55.9    Diabetic neuropathy, painful ([address] E11.40    Acquired hypothyroidism E03.9    Chronic atrial fibrillation (HCC) I48.20    Essential hypertension I10    Moderate persistent asthma without complication M13.56    Mixed hyperlipidemia E78.2    BPH associated with nocturia N40.1, R35.1    Anticoagulated on Coumadin Z79.01    Obesity (BMI 30-39. 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    Lumbar radiculopathy M54.16    Cervical radiculopathy M54.12    Type 2 diabetes mellitus with diabetic polyneuropathy, with long-term current use of insulin (HCC) E11.42, Z79.4    History of tobacco abuse Z87.891    Chronic bronchitis (HCC) J42    Coronary artery calcification seen on CT scan I25.10    Cardiomyopathy ([address] I42.9    Personal history of COVID-19 Z86.16       Past Medical History:        Diagnosis Date    Arthritis     Asthma     Reactive airway disease    Atrial fibrillation (HCC)     BPH (benign prostatic hypertrophy) 2013    Dr Teri Alejandra    CAD (coronary artery disease)     Minimal (Nonobstructive) Dr Jaycob Jarquin Cervical disc disorder, unspecified     Chronic back pain     Chronic respiratory failure with hypoxia (HCC)     COPD     Diabetes mellitus ([address]     Diabetic neuropathy ([address]     DM (diabetes mellitus) with complications ([address]     Elevated CPK 2009    Rheum workup negative 2009    Erectile dysfunction     GERD (gastroesophageal reflux disease)     Hypertension     Hypotestosteronism     Hypothyroidism     Neuropathy     Diabetic    CLARISSE (obstructive sleep apnea)     does not use CPAP machine    Paroxysmal atrial fibrillation (HCC)     Dr Joseline Cheatham    RBBB (right bundle branch block)     on EKG    Right ankle pain 05/20/2019    Vitamin D deficiency        Past Surgical History:        Procedure Laterality Date    BACK SURGERY      CARDIAC CATHETERIZATION  2006    CARDIAC CATHETERIZATION  2013    CARDIAC CATHETERIZATION  03/15/2019    Non Obs CAD    CARDIOVASCULAR STRESS TEST      CARPAL TUNNEL RELEASE          CATARACT REMOVAL WITH IMPLANT  2011    CATARACT REMOVAL WITH IMPLANT      CHOLECYSTECTOMY  06/10/2015    Laparoscopic    COLONOSCOPY  2014    COLONOSCOPY  10/26/2020    DIAGNOSTIC CARDIAC CATH LAB PROCEDURE  2013    ENDOSCOPY, COLON, DIAGNOSTIC  2016    EGD gastritis    EPIDURAL STEROID INJECTION Left 2019    LEFT LUMBAR FIVE SACRAL ONE EPIDURAL STEROID INJECTION SITE CONFIRMED BY FLUOROSCOPY performed by Barbie Silva MD at [facility] Right 10/17/2019    RIGHT CERVICAL SIX SEVEN EPIDURAL STEROID INJECTION SITE CONFIRMED BY FLUOROSCOPY performed by Barbie Silva MD at [address] [place] [address]         Social History:    Social History     Tobacco Use    Smoking status: Former     Packs/day: 1.00     Years: 20.00     Pack years: 20.00     Types: Cigarettes     Quit date: 1990     Years since quittin.6    Smokeless tobacco: Never   Substance Use Topics    Alcohol use: Yes     Comment: occas                                Counseling given: Not Answered      Vital Signs (Current):   Vitals:    22 0921 22 0733   BP:  115/79   Pulse:  78   Resp:  16   Temp:  (!) 96.6 °F (35.9 °C)   TempSrc:  Temporal   SpO2:  98%   Weight: 221 lb (100.2 kg) 218 lb (98.9 kg)   Height: 5' 10\" (1.778 m) 5' 10\" (1.778 m)                                              BP Readings from Last 3 Encounters:   22 115/79   22 112/72   22 119/69       NPO Status: Time of last liquid consumption:                         Time of last solid consumption:                         Date of last liquid consumption: 22                        Date of last solid food consumption: 22    BMI:   Wt Readings from Last 3 Encounters: 08/24/22 218 lb (98.9 kg)   07/26/22 223 lb (101.2 kg)   07/18/22 224 lb 3.2 oz (101.7 kg)     Body mass index is 31.28 kg/m². CBC:   Lab Results   Component Value Date/Time    WBC 12.8 08/24/2022 07:50 AM    RBC 5.47 08/24/2022 07:50 AM    HGB 17.0 08/24/2022 07:50 AM    HCT 50.4 08/24/2022 07:50 AM    MCV 92.2 08/24/2022 07:50 AM    RDW 13.4 08/24/2022 07:50 AM     08/24/2022 07:50 AM       CMP:   Lab Results   Component Value Date/Time     08/12/2022 07:56 AM    K 4.9 08/12/2022 07:56 AM    K 4.4 01/08/2019 01:20 PM     08/12/2022 07:56 AM    CO2 26 08/12/2022 07:56 AM    BUN 20 08/12/2022 07:56 AM    CREATININE 1.1 08/12/2022 07:56 AM    GFRAA >60 08/12/2022 07:56 AM    GFRAA >60 01/28/2013 09:24 AM    AGRATIO 1.3 08/12/2022 07:56 AM    LABGLOM >60 08/12/2022 07:56 AM    GLUCOSE 128 08/12/2022 07:56 AM    PROT 6.9 08/12/2022 07:56 AM    PROT 6.3 01/28/2013 09:24 AM    CALCIUM 9.2 08/12/2022 07:56 AM    BILITOT 0.4 08/12/2022 07:56 AM    ALKPHOS 67 08/12/2022 07:56 AM    AST 19 08/12/2022 07:56 AM    ALT 15 08/12/2022 07:56 AM       POC Tests: No results for input(s): POCGLU, POCNA, POCK, POCCL, POCBUN, POCHEMO, POCHCT in the last 72 hours.     Coags:   Lab Results   Component Value Date/Time    PROTIME 28.9 10/14/2020 10:12 AM    PROTIME 17.9 11/04/2014 10:34 AM    INR 2.70 08/17/2022 10:50 AM    APTT 42.4 09/12/2019 09:20 PM       HCG (If Applicable): No results found for: PREGTESTUR, PREGSERUM, HCG, HCGQUANT     ABGs: No results found for: PHART, PO2ART, RIT0TGM, MHS6RYZ, BEART, U0LHJTEL     Type & Screen (If Applicable):  No results found for: LABABO, LABRH    Drug/Infectious Status (If Applicable):  No results found for: HIV, HEPCAB    COVID-19 Screening (If Applicable): No results found for: COVID19        Anesthesia Evaluation  Patient summary reviewed and Nursing notes reviewed  Airway: Mallampati: I  TM distance: >3 FB   Neck ROM: full  Mouth opening: > = 3 FB   Dental: normal exam Pulmonary:Negative Pulmonary ROS and normal exam  breath sounds clear to auscultation  (+) COPD:  sleep apnea:  asthma:                            Cardiovascular:    (+) hypertension:, CAD:,         Rhythm: regular  Rate: normal                    Neuro/Psych:   Negative Neuro/Psych ROS  (+) neuromuscular disease:,             GI/Hepatic/Renal:   (+) GERD:,           Endo/Other:    (+) DiabetesType II DM, , hypothyroidism::., .                 Abdominal:   (+) obese,           Vascular:   + PVD, aortic or cerebral, . Other Findings:           Anesthesia Plan      general     ASA 3       Induction: intravenous. MIPS: Postoperative opioids intended and Prophylactic antiemetics administered. Anesthetic plan and risks discussed with patient. Plan discussed with CRNA.                     Jesus Soler MD   8/24/2022

## 2022-08-24 NOTE — OP NOTE
Operative Note      Patient: Harrison Lopez  YOB: 1950  MRN: [id number]    Date of Procedure: 8/24/2022    Pre-Op Diagnosis: ERECTILE DYSFUNCTION DUE TO DISEASES CLASSIFIED ELSEWHERE    Post-Op Diagnosis: Same       Procedure(s):  Implantation of  CX inflatable penile prosthesis (CPT 30123)    Surgeon(s):  Joyce Alvarado MD    Assistant:   Surgical Assistant: Elliot Connolly    Anesthesia: General    Estimated Blood Loss (mL): less than 622     Complications: None    Specimens:   * No specimens in log *    Implants:  Implant Name Type Inv. Item Serial No.  Lot No. LRB No. Used Action   CONSEAL RESERVOIR     [id number] N/A 1 Implanted   KIT PENILE PROS W/ SUT TIE CONN CLLT 22GA AND 15GA NDL FOR - [id number]  KIT PENILE PROS W/ SUT TIE CONN CLLT 22GA AND 15GA NDL FOR  [facility] [id number] N/A 1 Implanted   PROSTHESIS PENILE L1CM [id number] CTRL EXP NAT GIRTH EXP - [id number]  PROSTHESIS PENILE L1CM [id number] CTRL EXP NAT GIRTH EXP  [facility] [id number] N/A 1 Implanted   PROSTHESIS PENILE L21CM PARYLENE IFP APPRCH CTRL EXP - [id number]  PROSTHESIS PENILE L21CM PARYLENE IFP APPRCH CTRL EXP  [facility] [id number] N/A 1 Implanted         Drains: * No LDAs found *    Findings: 21cm CX with 1cm RTEs bilaterally, low profile Conceal reservoir behind rectus belly -  great cosmetic outcome      Detailed Description of Procedure: The patient was correctly identified and the operative plan was confirmed with the patient and the operative team. He then underwent appropriate dose of prophylactic antibiotics. IV Zosyn, IV Fluconazole and IV Gentamicin were  administered intravenously prior to the procedure and sequential compression devices were applied to the lower extremities and activated prior to induction of anesthesia. The patient was  placed in the supine position. General anesthesia was induced.  All pressure points were padded per protocol and the operative area was prepped and draped in the standard sterile fashion with  a 10-minute prep. At this point, a timeout was performed correctly identifying the patient, position, procedure, antibiotics, and necessary equipment. Then, an artificial erection was performed with a mixture of Marcaine and saline and noted a slight penile tilt but nothing significant. A 16Fr hill was inserted to aid in urethral identification. I then made a 4 cm incision about a fingerbreadth and a half above the penopubic angle. I went through Stacie's fascia and maintain good flaps superiorly and inferiorly. I then turned my attention to the penis for the corporotomies. I dissected meticulously down to the bilateral corpora, staying a little lateral to avoid the dorsal venous complex and nerves. These were identified definitively and then I placed 2-0 PDS holding stitches bilaterally. I then used an 11 blade and made my right corporotomy. I dilated with Jones dilators up to 13 mm. There was distal and proximal corporal fibrosis bilaterally which had to be negotiated. Measurements were taken and it was 11 cm distally and 11cm proximally on the right and left. Field goal test showed no evidence of proximal crossover and irrigation of the distal corpora showed no evidence of urethral injury. At this point decision was made to place an AMS CX device 21cm in length with 1cm RTEs bilaterally. As the device was being prepped, I proceeded with reservoir placement. I went a little cephalad onto the rectus fascia. This was incised and the underlying rectus muscle was seen and the midline of this was identified. The rectus muscle was split in the midline and I went in between the transversalis fascia and rectus belly slightly towards the patient's right. Into this I placed a low-profile conceal reservoir and placed 95 cc of sterile saline in the reservoir. Fascia was then closed with running 0 PDS.       The device was prepped in the usual fashion and was brought into the field. Using the [facility] documistic needle and [facility] device the implant was seated distally and then proximally was maneuvered into the corpora bilaterally. Corporotomies were closed with a combination of 2-0 PDS and 3-0 PDS. An erection with a surrogate reservoir was then performed and the penis appeared to be functionally straight. The implant was deflated. I then made a pocket for the pump. I went superior to the right cord and aimed to the midline with a sponge stick. The pump was placed in a dependent position. The quick connect system was used and the connection was made between the reservoir and the pump and the device was cycled and left inflated at about 40 to 50%. Meticulous hemostasis was then obtained. A NACHO drain was placed past the bilateral corporotomies next to the pump and secured to the skin with 3-0 silk. The tubing was then pexied laterally to immobilize the pump somewhat and to bury the tubing with 3-0 Monocryl. Stacie's fascia was closed with running 2-0 Monocryl. Deep dermal tissue was closed with interrupted 3-0 chromic. 4-0 Monocryl subcuticular was used to close the skin Dermabond was applied followed by fluffs and mesh panties.   Patient was taken to the PACU in stable condition     PLAN: The patient will see our team Friday in the clinic for drain and hill removal. I will then see him back in 4-5 weeks to activate the device and teach him how to use the newly implanted penile prosthesis    Electronically signed by Latricia Zamudio MD on 8/24/2022 at 10:20 AM

## 2022-08-24 NOTE — H&P
Urology Attending H&P Note      History: This is a 81 y.o. male who presents today for operative intervention for IPP    Family History, Social History, Review of Systems:  Reviewed and agreed to as per chart    Vitals:  /79   Pulse 78   Temp (!) 96.6 °F (35.9 °C) (Temporal)   Resp 16   Ht 5' 10\" (1.778 m)   Wt 218 lb (98.9 kg)   SpO2 98%   BMI 31.28 kg/m²   Temp  Av.6 °F (35.9 °C)  Min: 96.6 °F (35.9 °C)  Max: 96.6 °F (35.9 °C)  No intake or output data in the 24 hours ending 22 1502      Physical:  Well developed, well nourished in no acute distress  Mood indicates no abnormalities. Pt doesnt appear depressed  Orientated to time and place  Neck is supple, trachea is midline  Respiratory effort is normal  Cardiovascular show no extremity swelling  Abdomen no masses or hernias are palpated, there is no tenderness. Liver and Spleen appear normal.  Skin show no abnormal lesions  Lymph nodes are not palpated in the inguinal, neck, or axillary area.     Labs:  WBC:    Lab Results   Component Value Date/Time    WBC 11.4 2021 10:47 AM     Hemoglobin/Hematocrit:    Lab Results   Component Value Date/Time    HGB 16.6 2021 10:47 AM    HCT 50.7 2021 10:47 AM     BMP:    Lab Results   Component Value Date/Time     2022 07:56 AM    K 4.9 2022 07:56 AM    K 4.4 2019 01:20 PM     2022 07:56 AM    CO2 26 2022 07:56 AM    BUN 20 2022 07:56 AM    LABALBU 3.9 2022 07:56 AM    CREATININE 1.1 2022 07:56 AM    CALCIUM 9.2 2022 07:56 AM    GFRAA >60 2022 07:56 AM    GFRAA >60 2013 09:24 AM    LABGLOM >60 2022 07:56 AM     PT/INR:    Lab Results   Component Value Date/Time    PROTIME 28.9 10/14/2020 10:12 AM    PROTIME 17.9 2014 10:34 AM    INR 2.70 2022 10:50 AM     PTT:    Lab Results   Component Value Date/Time    APTT 42.4 2019 09:20 PM   [APTT      Impression/Plan:     -- to the OR   -- antibiotics on call  -- discussed with patient - all questions answered    Thank you for the opportunity to be involved in the care of this patient - please call with questions    Darion Thapa MD  [facility]  Office - [phone]

## 2022-08-24 NOTE — ANESTHESIA POSTPROCEDURE EVALUATION
Department of Anesthesiology  Postprocedure Note    Patient: Nadira Phoenix  MRN: [id number]  YOB: 1950  Date of evaluation: 8/24/2022      Procedure Summary     Date: 08/24/22 Room / Location: [address] [place] [address]  / [place]    Anesthesia Start: Sia Kearney Anesthesia Stop: 3512    Procedure: [address] (Penis) Diagnosis:       Erectile dysfunction due to diseases classified elsewhere      (ERECTILE DYSFUNCTION DUE TO DISEASES CLASSIFIED ELSEWHERE)    Surgeons: Carrol Saldivar MD Responsible Provider: Lalita Joshua MD    Anesthesia Type: general ASA Status: 3          Anesthesia Type: No value filed.     Paco Phase I: Paco Score: 8    Paco Phase II:        Anesthesia Post Evaluation    Patient location during evaluation: PACU  Patient participation: complete - patient participated  Level of consciousness: awake and alert  Pain score: 0  Airway patency: patent  Nausea & Vomiting: no nausea and no vomiting  Complications: no  Cardiovascular status: blood pressure returned to baseline  Respiratory status: acceptable  Hydration status: stable

## 2022-08-24 NOTE — PROGRESS NOTES
IRRISEPT IRRIGATION SOLUTION  REF#  ISEPT 450   LOT# [id number]  EXP:05/31/2024  PER DR. Chandrika Ramos

## 2022-08-24 NOTE — PROGRESS NOTES
A: Reviewed discharge instructions with patients spouse who acknowledged understanding, patient was able to dress himself and was discharged via wheelchair and with all belongings and prescriptions to private car.

## 2022-08-24 NOTE — PROGRESS NOTES
Patient admitted to [address]. Consents verified. Patient NPO since 2000 last night. All patient clothing to remain in room. Valuables kept with spouse, Annamarie.

## 2022-09-07 ENCOUNTER — ANTI-COAG VISIT (OUTPATIENT)
Dept: PHARMACY | Age: 72
End: 2022-09-07
Payer: COMMERCIAL

## 2022-09-07 DIAGNOSIS — I48.20 CHRONIC ATRIAL FIBRILLATION (HCC): Primary | ICD-10-CM

## 2022-09-07 LAB — INR BLD: 1.8

## 2022-09-07 PROCEDURE — 99211 OFF/OP EST MAY X REQ PHY/QHP: CPT | Performed by: PHARMACIST

## 2022-09-07 PROCEDURE — 85610 PROTHROMBIN TIME: CPT | Performed by: PHARMACIST

## 2022-09-07 RX ORDER — FENOFIBRATE 54 MG/1
54 TABLET ORAL DAILY
Qty: 90 TABLET | Refills: 5 | Status: SHIPPED | OUTPATIENT
Start: 2022-09-07

## 2022-09-07 NOTE — PROGRESS NOTES
is here for management of anticoagulation for AFib. PMH also significant for DM, Hyopthyroid, GERD, HTN, CAD, COPD. He presents today w/out complaint. Pt verifies dosing regimen as listed above. Pt denies s/s bleeding/bruising/swelling/SOB. No BRBPR. No melena. Reviewed pt medication list.  No changes in OTC/herbal medications. Reviewed dietary concerns. Pt states that he does not eat a lot of greens. No missed doses. Patient reports procedure on the 24th will be stopping warfarin on Friday. Started taking warfarin again on 8/31. Otherwise no changes reported. INR had otherwise been pretty stable, likely low from recent restart. INR 1.8 is below the acceptable therapeutic range of 2-3. Recommend to continue dose of 7.5 mg every day. Patient has 5 mg and 2 mg tablets. Will continue to monitor and check INR in 4 weeks. Dosing reminder card given with phone number, appointment date, and time. Return to clinic: 10/5 @ 10:45AM    Lydia Morales, PharmD Candidate 9/7/2022 11:51 AM    I have seen the patient and reviewed the progress note written by the PharmD Candidate. I agree with this assessment and plan.    John Sanchez [facility] [place], PharmD 9/7/2022 12:45 PM

## 2022-09-07 NOTE — TELEPHONE ENCOUNTER
Last OV 12/1/21  Next OV 12/7/22  Lipids 8/12/22    Would you like pt to still take Fenofibrate?  If so please send script to [facility]

## 2022-09-23 RX ORDER — PANTOPRAZOLE SODIUM 40 MG/1
40 TABLET, DELAYED RELEASE ORAL DAILY
Qty: 90 TABLET | Refills: 3 | Status: SHIPPED | OUTPATIENT
Start: 2022-09-23

## 2022-09-23 NOTE — TELEPHONE ENCOUNTER
Future appt scheduled 0 appt scheduled   Return in about 6 months (around 12/6/2022) for 40 min, Fasting.                      Last appt 07/26/2022      Last Written 10/21/2021    pantoprazole (PROTONIX) 40 MG tablet  #90  3 RF

## 2022-09-29 ENCOUNTER — TELEPHONE (OUTPATIENT)
Dept: FAMILY MEDICINE CLINIC | Age: 72
End: 2022-09-29

## 2022-09-29 DIAGNOSIS — M79.672 PAIN OF LEFT HEEL: Primary | ICD-10-CM

## 2022-09-29 NOTE — TELEPHONE ENCOUNTER
Notified wife order put in for foot x-ray. They are going to go to [place]. [facility].

## 2022-09-29 NOTE — TELEPHONE ENCOUNTER
Patient is having left heel pain x 1 month. Requesting an x-ray.  Next OV not scheduled  Last OV 7-26-22

## 2022-10-03 ENCOUNTER — HOSPITAL ENCOUNTER (OUTPATIENT)
Dept: GENERAL RADIOLOGY | Age: 72
Discharge: HOME | End: 2022-10-03
Payer: COMMERCIAL

## 2022-10-03 ENCOUNTER — HOSPITAL ENCOUNTER (OUTPATIENT)
Age: 72
Discharge: HOME | End: 2022-10-03
Payer: COMMERCIAL

## 2022-10-03 DIAGNOSIS — M79.672 PAIN OF LEFT HEEL: ICD-10-CM

## 2022-10-03 PROCEDURE — 73620 X-RAY EXAM OF FOOT: CPT

## 2022-10-03 PROCEDURE — 73630 X-RAY EXAM OF FOOT: CPT

## 2022-10-07 DIAGNOSIS — I10 ESSENTIAL HYPERTENSION: Chronic | ICD-10-CM

## 2022-10-10 RX ORDER — PRAVASTATIN SODIUM 20 MG
20 TABLET ORAL DAILY
Qty: 90 TABLET | Refills: 3 | Status: SHIPPED | OUTPATIENT
Start: 2022-10-10

## 2022-10-10 RX ORDER — METOPROLOL SUCCINATE 50 MG/1
TABLET, EXTENDED RELEASE ORAL
Qty: 90 TABLET | Refills: 5 | Status: SHIPPED | OUTPATIENT
Start: 2022-10-10

## 2022-10-18 ENCOUNTER — TELEPHONE (OUTPATIENT)
Dept: FAMILY MEDICINE CLINIC | Age: 72
End: 2022-10-18

## 2022-10-18 NOTE — TELEPHONE ENCOUNTER
Patient had xray of foot recently for heel pain, wife said his pain is no better. He has not been in to be evaluated for this.   Would you want to see him or would you refer him to specialist

## 2022-12-05 NOTE — PROGRESS NOTES
[address]   Cardiac Followup    Referring Provider:  Cecilia Garrett MD     Chief Complaint   Patient presents with    Follow-up    Coronary Artery Disease    Other      No new concerns        Subjective: Patient is being seen today for cardiology follow up for mild non-obstructive CAD,  HTN, and chronic afib; no complaints today    Past Medical History:    Mr. Inder Gaytan is a 71yo male with PMH mild NOCAD dx 2006, undefined CM EF=35-40%, GERD, HTN, CLARISSE (does not use CPAP often), and permanent afib on coumadin (refuses DOAC). [id number] manages his PT/INR, [facility] 8/9/13 showing mild NOCAD. No need for PCI. Note SAJI BLE 1/4/2016 normal.  Most recent [facility] 6/20-7/3/17 AFIB showed an average HR of 85 ()  was brief. Carotid US 2/22/18 was negative for blockage. Farrah nux 3/7/19 LVEF 54%  Apical hypokinesis  Fixed apical defect likely due to scar; No ischemia. Most recent ECHO 3/7/19 EF=35-40% with global HK (2012 and 2017 EF=55-60%). Due to worsening LVEF/SOB most recent [facility] 3/15/19 LM: distal 40% shelf like lesion LAD: ERNESTO-2 sluggish flow, luminals LCX: small, luminals RCA: dominant, luminals LVEDP:15 LVEF: 55% RHC  RA: 11 RV: 30/10  PA:  30/16 and mean of 20 PCWP: 15 Sats: On RA Ao: 94% RA: 61% PA: 62% CO/CI 4.28/1.8. Note    I d/c'd baby aspirin in Dec 2020 due to frequent falls and already on coumadin. I referred him to neurology for falls but he did not follow through. Most recent EKG 7/26/2022 showed AF 75 bpm; IRBBB and left axis; LAFB (no change 12/21)   Today, he reports doing well. Patient denies chest pain, sob, palpitations, dizziness or syncope. He has been decreasing portion sizes on meals and has lost a total of 41# since starting this. He has been compliant with medications and tolerates well.        Weight today is 217# (Down 14# from 231#)    Past Medical History:   has a past medical history of Arthritis, Asthma, Atrial fibrillation ([facility] [address] BPH (benign prostatic hypertrophy), CAD (coronary artery disease), Cervical disc disorder, unspecified, Chronic back pain, Chronic respiratory failure with hypoxia ([address] COPD, Diabetes mellitus ([address] Diabetic neuropathy ([address] DM (diabetes mellitus) with complications ([address] Elevated CPK, Erectile dysfunction, GERD (gastroesophageal reflux disease), Hypertension, Hypotestosteronism, Hypothyroidism, Neuropathy, CLARISSE (obstructive sleep apnea), Paroxysmal atrial fibrillation ([address] RBBB (right bundle branch block), Right ankle pain, and Vitamin D deficiency. Surgical History:   has a past surgical history that includes back surgery; Foot surgery; cardiovascular stress test; Cardiac catheterization (08/01/2006); Carpal tunnel release; Cataract removal with implant (03/09/2011); Cataract removal with implant; Diagnostic Cardiac Cath Lab Procedure (08/09/2013); Colonoscopy (2014); Cholecystectomy (06/10/2015); Endoscopy, colon, diagnostic (06/28/2016); Cardiac catheterization (08/09/2013); Cardiac catheterization (03/15/2019); epidural steroid injection (Left, 08/01/2019); epidural steroid injection (Right, 10/17/2019); Colonoscopy (10/26/2020); and Penile prosthesis placement (N/A, 8/24/2022). Social History:   reports that he quit smoking about 1990. His smoking use included cigarettes. He has a 20.00 pack-year smoking history. He has never used smokeless tobacco. He reports current alcohol use. He reports that he does not use drugs. Family History:  family history includes Diabetes in his father and mother; Emphysema in his paternal grandfather; Heart Failure in his father and mother; Hypertension in his father and mother; Other in his father and mother; Prostate Cancer in his maternal uncle. Home Medications:  Current Outpatient Medications on File Prior to Visit   Medication Sig Dispense Refill    gabapentin (NEURONTIN) 100 MG capsule Take 100 mg by mouth 2 times daily.       pravastatin (PRAVACHOL) 20 MG tablet TAKE 1 TABLET BY MOUTH  DAILY 90 tablet 3    metoprolol succinate (TOPROL XL) 50 MG extended release tablet TAKE 1 TABLET BY MOUTH  DAILY 90 tablet 5    pantoprazole (PROTONIX) 40 MG tablet TAKE 1 TABLET BY MOUTH  DAILY 90 tablet 3    fenofibrate (TRICOR) 54 MG tablet Take 1 tablet by mouth daily 90 tablet 5    fluticasone-salmeterol (ADVAIR DISKUS) 250-50 MCG/ACT AEPB diskus inhaler Inhale 1 puff into the lungs in the morning and 1 puff in the evening. Rinse mouth with water after use. 3 each 3    albuterol sulfate HFA (PROAIR HFA) 108 (90 Base) MCG/ACT inhaler Inhale 2 puffs into the lungs every 6 hours as needed for Wheezing or Shortness of Breath 3 each 3    lisinopril (PRINIVIL;ZESTRIL) 10 MG tablet TAKE 1 TABLET BY MOUTH  DAILY 90 tablet 3    fluticasone-salmeterol (ADVAIR DISKUS) 250-50 MCG/DOSE AEPB USE 1 INHALATION TWO TIMES  DAILY 180 each 3    ipratropium-albuterol (DUONEB) 0.5-2.5 (3) MG/3ML SOLN nebulizer solution Inhale 3 mLs into the lungs every 6 hours as needed for Shortness of Breath 360 mL 5    warfarin (COUMADIN) 10 MG tablet Take 1 tablet by mouth daily Every day except Wednesday. 90 tablet 1    insulin glargine (LANTUS SOLOSTAR) 100 UNIT/ML injection pen 50 Units 2 times daily       levothyroxine (SYNTHROID) 75 MCG tablet TAKE 1 TABLET BY MOUTH  DAILY 90 tablet 1    TRULICITY 1.5 XL/2.1DI SOPN Inject 1.5 mg into the skin once a week      doxazosin (CARDURA) 2 MG tablet Take 0.5 tablets by mouth daily (dose reduction) (Patient taking differently: Take 1 mg by mouth daily (dose reduction) 1 mg) 30 tablet 0    JARDIANCE 25 MG tablet Take 10 mg by mouth daily  2    glucose blood VI test strips (CRUZITO CONTOUR NEXT TEST) strip Testing 4 x a day DX: E11.42 125 each 5    fluticasone (FLONASE) 50 MCG/ACT nasal spray 2 sprays by Nasal route daily 3 Bottle 1    Insulin Pen Needle (NOVOFINE) 30G X 8 MM MISC APPLY 1 EACH TOPICALLY 2 TIMES DAILY.  300 each 2    [DISCONTINUED] tadalafil (CIALIS) 20 MG tablet Take 1 tablet by mouth daily as needed for Erectile Dysfunction 30 tablet 5     No current facility-administered medications on file prior to visit. Allergies:  Lyrica [pregabalin], Metoclopramide, Simvastatin, and Amlodipine     Review of Systems:   Constitutional: there has been no unanticipated weight loss. There's been no change in energy level, sleep pattern, or activity level. Eyes: No visual changes or diplopia. No scleral icterus. ENT: No Headaches, hearing loss or vertigo. No mouth sores or sore throat. Cardiovascular: Reviewed in HPI  Respiratory: No cough or wheezing, no sputum production. No hematemesis. Gastrointestinal: No abdominal pain, appetite loss, blood in stools. No change in bowel or bladder habits. Genitourinary: No dysuria, trouble voiding, or hematuria. Musculoskeletal:  No gait disturbance, weakness or joint complaints. Integumentary: No rash or pruritis. Neurological: No headache, diplopia, change in muscle strength, numbness or tingling. No change in gait, balance, coordination, mood, affect, memory, mentation, behavior. Psychiatric: No anxiety, no depression. Endocrine: No malaise, fatigue or temperature intolerance. No excessive thirst, fluid intake, or urination. No tremor. Hematologic/Lymphatic: No abnormal bruising or bleeding, blood clots or swollen lymph nodes. Allergic/Immunologic: No nasal congestion or hives. Physical Examination:    Vitals:    12/07/22 1225   BP: 110/70   Pulse: 67   Temp: 98.6 °F (37 °C)   SpO2: 97%         Wt Readings from Last 3 Encounters:   12/07/22 217 lb 12.8 oz (98.8 kg)   08/24/22 218 lb (98.9 kg)   07/26/22 223 lb (101.2 kg)       Constitutional and General Appearance: NAD;   Respiratory:  Normal excursion and expansion without use of accessory muscles  Resp Auscultation: Clear breath sounds without dullness, no crackles or wheezes  Cardiovascular:   The apical impulses not displaced  Heart tones are crisp and normal  Cervical veins are not engorged  The carotid upstroke is normal in amplitude and contour without delay or bruit  Irregularly irregular, No S3, No Murmur  Peripheral pulses are symmetrical and full  There is no clubbing, cyanosis of the extremities.   no edema  Femoral Arteries: 2+ and equal  Pedal Pulses: 2+ and equal   Abdomen:  No masses or tenderness  Liver/Spleen: No Abnormalities Noted  Neurological/Psychiatric:  Alert and oriented in all spheres  Moves all extremities well  Exhibits normal gait balance and coordination  No abnormalities of mood, affect, memory, mentation, or behavior are noted      LABS:  Lab Results   Component Value Date     08/12/2022    K 4.9 08/12/2022     08/12/2022    CO2 26 08/12/2022    BUN 20 08/12/2022    CREATININE 1.1 08/12/2022    GLUCOSE 128 (H) 08/12/2022    CALCIUM 9.2 08/12/2022    PROT 6.9 08/12/2022    LABALBU 3.9 08/12/2022    BILITOT 0.4 08/12/2022    ALKPHOS 67 08/12/2022    AST 19 08/12/2022    ALT 15 08/12/2022    LABGLOM >60 08/12/2022    GFRAA >60 08/12/2022    AGRATIO 1.3 08/12/2022    GLOB 2.6 08/02/2021       Lab Results   Component Value Date    WBC 12.8 (H) 08/24/2022    HGB 17.0 08/24/2022    HCT 50.4 08/24/2022    MCV 92.2 08/24/2022     08/24/2022     Lab Results   Component Value Date    TSH 1.75 08/12/2022    FT3 2.7 08/12/2022    T4FREE 1.2 08/12/2022     Hemoglobin A1C   Date Value Ref Range Status   08/12/2022 6.1 See comment % Final     Comment:     Comment:  Diagnosis of Diabetes: > or = 6.5%  Increased risk of diabetes (Prediabetes): 5.7-6.4%  Glycemic Control: Nonpregnant Adults: <7.0%                    Pregnant: <6.0%         Lab Results   Component Value Date    CHOL 125 08/12/2022    CHOL 120 02/11/2022    CHOL 131 08/02/2021     Lab Results   Component Value Date    TRIG 212 (H) 08/12/2022    TRIG 97 02/11/2022    TRIG 171 (H) 08/02/2021     Lab Results   Component Value Date    HDL 31 (L) 08/12/2022    HDL 33 (L) 02/11/2022    HDL 30 (L) 08/02/2021     Lab Results   Component Value Date    LDLCALC 52 08/12/2022    LDLCALC 68 02/11/2022    LDLCALC 67 08/02/2021     Lab Results   Component Value Date    LABVLDL 42 08/12/2022    LABVLDL 19 02/11/2022    LABVLDL 34 08/02/2021     No results found for: CHOLHDLRATIO     Assessment:     1. Hypertension : Stable and well controlled and will continue present medical regimen. 2. GERD (gastroesophageal reflux disease) : managed per PCP. 3. DM (diabetes mellitus)  : managed per PCP     4. Atrial fibrillation:  Mainstay of treatment is HR control and anticoagulation therapy. Note [facility] manages his coumadin therapy now (used to be Dr. Pilar West office). I have d/w him changing to NOAC but refuses and prefers coumadin. 5. CAD (coronary artery disease):  Mild non-obstructive CAD and will continue medical management. Most recent [facility] 3/15/19 NOCAD. There are no concerning symptoms for angina currently. 6.  Hyperlipidemia:  Most recent 8/12/22 I personally reviewed in Epic (see above). He had intolerance to Zocor therapy back in Jan 2013 with muscle cramping and CK was noted to 476. He continues Pravastatin 20mg qd and tolerates. His TG were 315 prior. He is watching diet now taking tricor 54mg qd--most recent 8/12/2022 AY=852    7. Undefined CM:  ECHO 3/7/19 EF=35-40%. Continue GDMT with jardiance, lisinopril 10mg qd, Toprol XL 50mg qd. I recommended MRA aldactone but he refuses not wanting more medication. Plan:  1. Recommend that you start Aldactone 25 mg daily to help increase heart strength. Patient declines at this time. 2. Continue current course  3. Changing warfarin to xarelto/Eliquis discussed. Patient declines at this time. Plan to follow up in 1 year    Cost of prescription medications and patient compliance have been reviewed with patient. All questions answered.      Thank you for allowing me to participate in the care of this individual.    This note was scribed in the presence of Marilee Garcia MD by Ellis Osman RN. I, Dr. Alondra Bustos, personally performed the services described in this documentation, as scribed by the above signed scribe in my presence. It is both accurate and complete to my knowledge. I agree with the details independently gathered by the clinical support staff, while the remaining scribed note accurately describes my personal service to the patient. Swapna Sanchez M.D., [facility]

## 2022-12-07 ENCOUNTER — OFFICE VISIT (OUTPATIENT)
Dept: CARDIOLOGY CLINIC | Age: 72
End: 2022-12-07
Payer: MEDICARE

## 2022-12-07 VITALS
WEIGHT: 217.8 LBS | OXYGEN SATURATION: 97 % | DIASTOLIC BLOOD PRESSURE: 70 MMHG | BODY MASS INDEX: 31.18 KG/M2 | SYSTOLIC BLOOD PRESSURE: 110 MMHG | TEMPERATURE: 98.6 F | HEART RATE: 67 BPM | HEIGHT: 70 IN

## 2022-12-07 DIAGNOSIS — I42.9 CARDIOMYOPATHY, UNSPECIFIED TYPE (HCC): ICD-10-CM

## 2022-12-07 DIAGNOSIS — I25.10 CORONARY ARTERY DISEASE INVOLVING NATIVE CORONARY ARTERY OF NATIVE HEART WITHOUT ANGINA PECTORIS: ICD-10-CM

## 2022-12-07 DIAGNOSIS — I25.10 CORONARY ARTERY CALCIFICATION SEEN ON CT SCAN: ICD-10-CM

## 2022-12-07 DIAGNOSIS — I48.20 CHRONIC ATRIAL FIBRILLATION (HCC): Chronic | ICD-10-CM

## 2022-12-07 DIAGNOSIS — I10 ESSENTIAL HYPERTENSION: Primary | Chronic | ICD-10-CM

## 2022-12-07 DIAGNOSIS — E78.2 MIXED HYPERLIPIDEMIA: Chronic | ICD-10-CM

## 2022-12-07 DIAGNOSIS — Z87.891 HISTORY OF TOBACCO ABUSE: ICD-10-CM

## 2022-12-07 PROCEDURE — G8417 CALC BMI ABV UP PARAM F/U: HCPCS | Performed by: INTERNAL MEDICINE

## 2022-12-07 PROCEDURE — 1123F ACP DISCUSS/DSCN MKR DOCD: CPT | Performed by: INTERNAL MEDICINE

## 2022-12-07 PROCEDURE — 1036F TOBACCO NON-USER: CPT | Performed by: INTERNAL MEDICINE

## 2022-12-07 PROCEDURE — 3074F SYST BP LT 130 MM HG: CPT | Performed by: INTERNAL MEDICINE

## 2022-12-07 PROCEDURE — G8484 FLU IMMUNIZE NO ADMIN: HCPCS | Performed by: INTERNAL MEDICINE

## 2022-12-07 PROCEDURE — 99214 OFFICE O/P EST MOD 30 MIN: CPT | Performed by: INTERNAL MEDICINE

## 2022-12-07 PROCEDURE — 3078F DIAST BP <80 MM HG: CPT | Performed by: INTERNAL MEDICINE

## 2022-12-07 PROCEDURE — G8427 DOCREV CUR MEDS BY ELIG CLIN: HCPCS | Performed by: INTERNAL MEDICINE

## 2022-12-07 PROCEDURE — 3017F COLORECTAL CA SCREEN DOC REV: CPT | Performed by: INTERNAL MEDICINE

## 2022-12-07 RX ORDER — GABAPENTIN 100 MG/1
100 CAPSULE ORAL 2 TIMES DAILY
COMMUNITY

## 2022-12-07 NOTE — PATIENT INSTRUCTIONS
Plan:  1. Recommend that you start Aldactone 25 mg daily to help increase heart strength. Patient declines at this time. 2. Continue current course  3. Changing warfarin to xarelto/Eliquis discussed. Patient declines at this time.       Plan to follow up in 1 year

## 2022-12-24 DIAGNOSIS — I48.20 CHRONIC ATRIAL FIBRILLATION (HCC): Chronic | ICD-10-CM

## 2022-12-27 NOTE — TELEPHONE ENCOUNTER
[phone] (Home Phone) LM for pt to call back and schedule next follow up appt           Future appt scheduled 0 appt scheduled  Return in about 6 months (around 12/6/2022) for 40 min, Fasting.                                 Last appt 07/26/2022

## 2022-12-28 RX ORDER — WARFARIN SODIUM 2 MG/1
2 TABLET ORAL DAILY
Qty: 90 TABLET | Refills: 3 | Status: SHIPPED | OUTPATIENT
Start: 2022-12-28

## 2022-12-29 ENCOUNTER — TELEPHONE (OUTPATIENT)
Dept: FAMILY MEDICINE CLINIC | Age: 72
End: 2022-12-29

## 2022-12-29 NOTE — TELEPHONE ENCOUNTER
Patient has appt scheduled for tomorrow. Spouse calling to inform that patient has been taking Gabapentin 300 mg 1 po QHS  prescribe by Dr. Ayush Garza and also taking the Cymbalta 30 mg 2 po QAM was prescribed by a Dr. Faina Schafer .  Wanting to know if you could take over refilling both these medications.

## 2022-12-30 ENCOUNTER — OFFICE VISIT (OUTPATIENT)
Dept: FAMILY MEDICINE CLINIC | Age: 72
End: 2022-12-30
Payer: MEDICARE

## 2022-12-30 VITALS
SYSTOLIC BLOOD PRESSURE: 115 MMHG | OXYGEN SATURATION: 96 % | WEIGHT: 223 LBS | HEART RATE: 72 BPM | BODY MASS INDEX: 32 KG/M2 | DIASTOLIC BLOOD PRESSURE: 80 MMHG | TEMPERATURE: 97.8 F

## 2022-12-30 DIAGNOSIS — E11.40 DIABETIC NEUROPATHY, PAINFUL (HCC): ICD-10-CM

## 2022-12-30 DIAGNOSIS — J45.40 MODERATE PERSISTENT ASTHMA WITHOUT COMPLICATION: ICD-10-CM

## 2022-12-30 DIAGNOSIS — I10 ESSENTIAL HYPERTENSION: ICD-10-CM

## 2022-12-30 DIAGNOSIS — Z23 NEEDS FLU SHOT: ICD-10-CM

## 2022-12-30 DIAGNOSIS — I48.20 CHRONIC ATRIAL FIBRILLATION (HCC): ICD-10-CM

## 2022-12-30 DIAGNOSIS — Z79.4 TYPE 2 DIABETES MELLITUS WITH DIABETIC POLYNEUROPATHY, WITH LONG-TERM CURRENT USE OF INSULIN (HCC): Primary | ICD-10-CM

## 2022-12-30 DIAGNOSIS — E78.2 MIXED HYPERLIPIDEMIA: ICD-10-CM

## 2022-12-30 DIAGNOSIS — E11.42 TYPE 2 DIABETES MELLITUS WITH DIABETIC POLYNEUROPATHY, WITH LONG-TERM CURRENT USE OF INSULIN (HCC): Primary | ICD-10-CM

## 2022-12-30 DIAGNOSIS — E03.9 ACQUIRED HYPOTHYROIDISM: ICD-10-CM

## 2022-12-30 LAB
A/G RATIO: 1.6 (ref 1.1–2.2)
ALBUMIN SERPL-MCNC: 3.9 G/DL (ref 3.4–5)
ALP BLD-CCNC: 66 U/L (ref 40–129)
ALT SERPL-CCNC: 17 U/L (ref 10–40)
ANION GAP SERPL CALCULATED.3IONS-SCNC: 8 MMOL/L (ref 3–16)
AST SERPL-CCNC: 18 U/L (ref 15–37)
BILIRUB SERPL-MCNC: 0.4 MG/DL (ref 0–1)
BUN BLDV-MCNC: 15 MG/DL (ref 7–20)
CALCIUM SERPL-MCNC: 9.3 MG/DL (ref 8.3–10.6)
CHLORIDE BLD-SCNC: 105 MMOL/L (ref 99–110)
CHOLESTEROL, TOTAL: 145 MG/DL (ref 0–199)
CO2: 26 MMOL/L (ref 21–32)
CREAT SERPL-MCNC: 1.1 MG/DL (ref 0.8–1.3)
GFR SERPL CREATININE-BSD FRML MDRD: >60 ML/MIN/{1.73_M2}
GLUCOSE BLD-MCNC: 154 MG/DL (ref 70–99)
HCT VFR BLD CALC: 52.2 % (ref 40.5–52.5)
HDLC SERPL-MCNC: 36 MG/DL (ref 40–60)
HEMOGLOBIN: 17.3 G/DL (ref 13.5–17.5)
LDL CHOLESTEROL CALCULATED: 76 MG/DL
MCH RBC QN AUTO: 31.4 PG (ref 26–34)
MCHC RBC AUTO-ENTMCNC: 33.2 G/DL (ref 31–36)
MCV RBC AUTO: 94.6 FL (ref 80–100)
PDW BLD-RTO: 14.5 % (ref 12.4–15.4)
PLATELET # BLD: 197 K/UL (ref 135–450)
PMV BLD AUTO: 8 FL (ref 5–10.5)
POTASSIUM SERPL-SCNC: 4.2 MMOL/L (ref 3.5–5.1)
RBC # BLD: 5.52 M/UL (ref 4.2–5.9)
SODIUM BLD-SCNC: 139 MMOL/L (ref 136–145)
TOTAL PROTEIN: 6.3 G/DL (ref 6.4–8.2)
TRIGL SERPL-MCNC: 164 MG/DL (ref 0–150)
TSH REFLEX: 3.23 UIU/ML (ref 0.27–4.2)
VLDLC SERPL CALC-MCNC: 33 MG/DL
WBC # BLD: 10.2 K/UL (ref 4–11)

## 2022-12-30 PROCEDURE — 3078F DIAST BP <80 MM HG: CPT | Performed by: FAMILY MEDICINE

## 2022-12-30 PROCEDURE — 90694 VACC AIIV4 NO PRSRV 0.5ML IM: CPT | Performed by: FAMILY MEDICINE

## 2022-12-30 PROCEDURE — G0008 ADMIN INFLUENZA VIRUS VAC: HCPCS | Performed by: FAMILY MEDICINE

## 2022-12-30 PROCEDURE — G8484 FLU IMMUNIZE NO ADMIN: HCPCS | Performed by: FAMILY MEDICINE

## 2022-12-30 PROCEDURE — 99214 OFFICE O/P EST MOD 30 MIN: CPT | Performed by: FAMILY MEDICINE

## 2022-12-30 PROCEDURE — G8417 CALC BMI ABV UP PARAM F/U: HCPCS | Performed by: FAMILY MEDICINE

## 2022-12-30 PROCEDURE — 1123F ACP DISCUSS/DSCN MKR DOCD: CPT | Performed by: FAMILY MEDICINE

## 2022-12-30 PROCEDURE — 2022F DILAT RTA XM EVC RTNOPTHY: CPT | Performed by: FAMILY MEDICINE

## 2022-12-30 PROCEDURE — G8427 DOCREV CUR MEDS BY ELIG CLIN: HCPCS | Performed by: FAMILY MEDICINE

## 2022-12-30 PROCEDURE — 3074F SYST BP LT 130 MM HG: CPT | Performed by: FAMILY MEDICINE

## 2022-12-30 PROCEDURE — 3017F COLORECTAL CA SCREEN DOC REV: CPT | Performed by: FAMILY MEDICINE

## 2022-12-30 PROCEDURE — 1036F TOBACCO NON-USER: CPT | Performed by: FAMILY MEDICINE

## 2022-12-30 PROCEDURE — 3044F HG A1C LEVEL LT 7.0%: CPT | Performed by: FAMILY MEDICINE

## 2022-12-30 PROCEDURE — 36415 COLL VENOUS BLD VENIPUNCTURE: CPT | Performed by: FAMILY MEDICINE

## 2022-12-30 RX ORDER — GABAPENTIN 300 MG/1
CAPSULE ORAL
COMMUNITY
Start: 2022-12-06

## 2022-12-30 RX ORDER — DULOXETIN HYDROCHLORIDE 60 MG/1
60 CAPSULE, DELAYED RELEASE ORAL DAILY
Qty: 90 CAPSULE | Refills: 1 | Status: SHIPPED | OUTPATIENT
Start: 2022-12-30

## 2022-12-30 RX ORDER — DULOXETIN HYDROCHLORIDE 30 MG/1
CAPSULE, DELAYED RELEASE ORAL
COMMUNITY
Start: 2022-02-16 | End: 2022-12-30

## 2022-12-30 RX ORDER — GABAPENTIN 300 MG/1
300 CAPSULE ORAL NIGHTLY
Qty: 90 CAPSULE | Refills: 1 | Status: SHIPPED | OUTPATIENT
Start: 2022-12-30 | End: 2023-06-28

## 2022-12-30 NOTE — PROGRESS NOTES
He presents today for follow-up of his diabetes hypertension thyroid disease atrial fibrillation asthma hyperlipidemia and painful neuropathy. He wants to know if I can start refilling his Cymbalta and gabapentin for neuropathy. He saw GI for chronic constipation and he was given lactulose but still no better on review of outside medications it states he was on Trulance for constipation but he does not recall taking that medicine so he will check with his wife when he goes home to see if he is indeed taking it. He states of all the medicines he is tried in the past the [facility] [address] probably worked the best for his idiopathic constipation. His last hemoglobin A1c was in a good range he continues to see endocrinology every 6 months he is having problems with his left shoulder MRI was fairly unremarkable when Dr. Karri Reddy saw him and she gave him a shot in the shoulder which did help for a while but the pain is back in a different place now  Tests and documents reviewed today: MRI report ordered by Dr. Karri Reddy reviewed today as well as last labs and last note from GI specialist Dr. Darcy Alonzo     Objective:   /80   Pulse 72   Temp 97.8 °F (36.6 °C) (Oral)   Wt 223 lb (101.2 kg)   SpO2 96%   BMI 32.00 kg/m²   BP Readings from Last 3 Encounters:   12/30/22 115/80   12/07/22 110/70   08/24/22 120/75     Physical Exam  Vitals reviewed. Constitutional:       Appearance: He is well-developed. He is not toxic-appearing. HENT:      Head: Normocephalic. Neck:      Thyroid: No thyroid mass or thyromegaly. Cardiovascular:      Rate and Rhythm: Normal rate and regular rhythm. Heart sounds: Normal heart sounds. No murmur heard. Pulmonary:      Effort: No respiratory distress. Breath sounds: Normal breath sounds. No wheezing or rales. Abdominal:      General: There is no distension. Palpations: Abdomen is soft. There is no mass. Tenderness: There is no abdominal tenderness.  There is no guarding or rebound. Musculoskeletal:      Cervical back: Neck supple. Lymphadenopathy:      Cervical: No cervical adenopathy. Upper Body:      Right upper body: No supraclavicular adenopathy. Skin:     General: Skin is warm. Neurological:      Mental Status: He is alert and oriented to person, place, and time. Psychiatric:         Behavior: Behavior normal.         Thought Content: Thought content normal.         Judgment: Judgment normal.     Assessment and Plan:   Diagnosis Orders   1. Type 2 diabetes mellitus with diabetic polyneuropathy, with long-term current use of insulin ([facility])  CBC    Hemoglobin A1C      2. Essential hypertension  Comprehensive Metabolic Panel      3. Acquired hypothyroidism  TSH with Reflex      4. Chronic atrial fibrillation (HCC)        5. Moderate persistent asthma without complication        6. Mixed hyperlipidemia  Lipid Panel      7. Needs flu shot  Influenza, FLUAD, (age 72 y+), IM, Preservative Free, 0.5 mL      8. Diabetic neuropathy, painful ([facility])  DULoxetine (CYMBALTA) 60 MG extended release capsule    gabapentin (NEURONTIN) 300 MG capsule      FU with Dr Virgie Stafford for left shoulder pain. We will refill his cymbalta and neurontin for his neuropathy pain. He will check with his wife to see if he is indeed taking Trulance daily for his constipation. If not consider restarting Linzess. His hemoglobin A1c has been under good control since starting Trulicity by endocrinologist.  Is also had good weight loss on Trulicity. Blood pressure is controlled so we will continue current medicine. Recheck thyroid level and adjust dose if needed. Asthma and A. fib are stable  The problems listed in the assessment are stable unless otherwise indicated. Prescription drug management completed today. He  was instructed to continue their current medications and treatment for the above problems unless otherwise indicated above.    Age-specific preventative medicine recommendations were reviewed with patient today. Follow up in 4mo. Call or return to office for any problems that develop before the next scheduled follow-up appointment. Morro De León M.D. Parts of this note were completed using voice recognition transcription. Every effort was made to ensure accuracy; however, inadvertent transcription errors may be present.

## 2022-12-30 NOTE — PATIENT INSTRUCTIONS
Please read the healthy family handout that you were given and share it with your family. Please compare this printed medication list with your medications at home to be sure they are the same. If you have any medications that are different please contact us immediately at [phone]. Also review your allergies that we have listed, these may also include medications that you have not been able to tolerate, make sure everything listed is correct. If you have any allergies that are different please contact us immediately at [phone]. You may receive a survey in the mail or by email asking about your experience during your visit today. Please complete and return to us so we know how we are serving you.

## 2022-12-31 LAB
ESTIMATED AVERAGE GLUCOSE: 142.7 MG/DL
HBA1C MFR BLD: 6.6 %

## 2023-01-04 ENCOUNTER — ANTI-COAG VISIT (OUTPATIENT)
Dept: PHARMACY | Age: 73
End: 2023-01-04
Payer: MEDICARE

## 2023-01-04 DIAGNOSIS — I48.20 CHRONIC ATRIAL FIBRILLATION (HCC): Primary | ICD-10-CM

## 2023-01-04 LAB — INTERNATIONAL NORMALIZATION RATIO, POC: 2

## 2023-01-04 PROCEDURE — 85610 PROTHROMBIN TIME: CPT | Performed by: PHARMACIST

## 2023-01-04 PROCEDURE — 99211 OFF/OP EST MAY X REQ PHY/QHP: CPT | Performed by: PHARMACIST

## 2023-01-04 NOTE — PROGRESS NOTES
is here for management of anticoagulation for AFib. PMH also significant for DM, Hyopthyroid, GERD, HTN, CAD, COPD. He presents today w/out complaint. Pt verifies dosing regimen as listed above. Pt denies s/s bleeding/bruising/swelling/SOB. No BRBPR. No melena. Reviewed pt medication list.  No changes in OTC/herbal medications. Reviewed dietary concerns. Pt states that he does not eat a lot of greens. No missed doses. No changes per pt. INR 2.0 is within the acceptable therapeutic range of 2-3. Recommend to continue dose of 7.5 mg every day. Patient has 5 mg and 2 mg tablets. Will continue to monitor and check INR in 4 weeks. Dosing reminder card given with phone number, appointment date, and time.   Return to clinic: 2/1/23 @ 1100 AM    Robin SernaD 11:04 AM EST 1/4/23

## 2023-01-26 DIAGNOSIS — I10 ESSENTIAL HYPERTENSION: Primary | ICD-10-CM

## 2023-01-27 RX ORDER — LISINOPRIL 10 MG/1
10 TABLET ORAL DAILY
Qty: 90 TABLET | Refills: 3 | Status: SHIPPED | OUTPATIENT
Start: 2023-01-27

## 2023-01-27 NOTE — TELEPHONE ENCOUNTER
JMM OOT, PREETIG Covering    Last OV [facility] 12/7/2022  Next OV [facility] 12/11/2023  Labs 12/2022

## 2023-02-01 ENCOUNTER — ANTI-COAG VISIT (OUTPATIENT)
Dept: PHARMACY | Age: 73
End: 2023-02-01
Payer: MEDICARE

## 2023-02-01 ENCOUNTER — HOSPITAL ENCOUNTER (OUTPATIENT)
Age: 73
Discharge: HOME | End: 2023-02-01
Payer: MEDICARE

## 2023-02-01 DIAGNOSIS — I48.20 CHRONIC ATRIAL FIBRILLATION (HCC): Primary | ICD-10-CM

## 2023-02-01 LAB
A/G RATIO: 1.5 (ref 1.1–2.2)
ALBUMIN SERPL-MCNC: 3.8 G/DL (ref 3.4–5)
ALP BLD-CCNC: 56 U/L (ref 40–129)
ALT SERPL-CCNC: 12 U/L (ref 10–40)
ANION GAP SERPL CALCULATED.3IONS-SCNC: 8 MMOL/L (ref 3–16)
AST SERPL-CCNC: 19 U/L (ref 15–37)
BILIRUB SERPL-MCNC: 0.5 MG/DL (ref 0–1)
BUN BLDV-MCNC: 12 MG/DL (ref 7–20)
CALCIUM SERPL-MCNC: 9 MG/DL (ref 8.3–10.6)
CHLORIDE BLD-SCNC: 107 MMOL/L (ref 99–110)
CO2: 26 MMOL/L (ref 21–32)
CREAT SERPL-MCNC: 1 MG/DL (ref 0.8–1.3)
GFR SERPL CREATININE-BSD FRML MDRD: >60 ML/MIN/{1.73_M2}
GLUCOSE BLD-MCNC: 109 MG/DL (ref 70–99)
INTERNATIONAL NORMALIZATION RATIO, POC: 3.4
POTASSIUM SERPL-SCNC: 3.9 MMOL/L (ref 3.5–5.1)
SODIUM BLD-SCNC: 141 MMOL/L (ref 136–145)
TOTAL PROTEIN: 6.3 G/DL (ref 6.4–8.2)

## 2023-02-01 PROCEDURE — 84481 FREE ASSAY (FT-3): CPT

## 2023-02-01 PROCEDURE — 84439 ASSAY OF FREE THYROXINE: CPT

## 2023-02-01 PROCEDURE — 80053 COMPREHEN METABOLIC PANEL: CPT

## 2023-02-01 PROCEDURE — 99211 OFF/OP EST MAY X REQ PHY/QHP: CPT | Performed by: PHARMACIST

## 2023-02-01 PROCEDURE — 82570 ASSAY OF URINE CREATININE: CPT

## 2023-02-01 PROCEDURE — 85610 PROTHROMBIN TIME: CPT | Performed by: PHARMACIST

## 2023-02-01 PROCEDURE — 82043 UR ALBUMIN QUANTITATIVE: CPT

## 2023-02-01 PROCEDURE — 83036 HEMOGLOBIN GLYCOSYLATED A1C: CPT

## 2023-02-01 PROCEDURE — 84443 ASSAY THYROID STIM HORMONE: CPT

## 2023-02-01 PROCEDURE — 80061 LIPID PANEL: CPT

## 2023-02-01 NOTE — PROGRESS NOTES
is here for management of anticoagulation for AFib. PMH also significant for DM, Hyopthyroid, GERD, HTN, CAD, COPD. He presents today w/out complaint. Pt verifies dosing regimen as listed above. Pt denies s/s bleeding/bruising/swelling/SOB. No BRBPR. No melena. Reviewed pt medication list.  No changes in OTC/herbal medications. Reviewed dietary concerns. Pt states that he does not eat a lot of greens. No missed doses. No changes per pt. INR 3.4 is above the acceptable therapeutic range of 2-3. Recommend to take 5 mg today, then continue dose of 7.5 mg every day. Patient has 5 mg and 2 mg tablets. Will continue to monitor and check INR in 4 weeks. Dosing reminder card given with phone number, appointment date, and time.   Return to clinic: 3/1/23 @ 1100 AM    Robin VitaleD 10:39 AM EST 2/1/23

## 2023-02-02 LAB
CHOLESTEROL, TOTAL: 133 MG/DL (ref 0–199)
CREATININE URINE: 120.2 MG/DL (ref 39–259)
ESTIMATED AVERAGE GLUCOSE: 145.6 MG/DL
HBA1C MFR BLD: 6.7 %
HDLC SERPL-MCNC: 33 MG/DL (ref 40–60)
LDL CHOLESTEROL CALCULATED: 79 MG/DL
MICROALBUMIN UR-MCNC: <1.2 MG/DL
MICROALBUMIN/CREAT UR-RTO: NORMAL MG/G (ref 0–30)
T3 FREE: 2.4 PG/ML (ref 2.3–4.2)
T4 FREE: 1.3 NG/DL (ref 0.9–1.8)
TRIGL SERPL-MCNC: 103 MG/DL (ref 0–150)
TSH SERPL DL<=0.05 MIU/L-ACNC: 1.91 UIU/ML (ref 0.27–4.2)
VLDLC SERPL CALC-MCNC: 21 MG/DL

## 2023-03-01 ENCOUNTER — ANTI-COAG VISIT (OUTPATIENT)
Dept: PHARMACY | Age: 73
End: 2023-03-01
Payer: MEDICARE

## 2023-03-01 DIAGNOSIS — I48.20 CHRONIC ATRIAL FIBRILLATION (HCC): Primary | ICD-10-CM

## 2023-03-01 LAB — INTERNATIONAL NORMALIZATION RATIO, POC: 2.8

## 2023-03-01 PROCEDURE — 85610 PROTHROMBIN TIME: CPT | Performed by: PHARMACIST

## 2023-03-01 PROCEDURE — 99211 OFF/OP EST MAY X REQ PHY/QHP: CPT | Performed by: PHARMACIST

## 2023-03-01 NOTE — PROGRESS NOTES
is here for management of anticoagulation for AFib. PMH also significant for DM, Hyopthyroid, GERD, HTN, CAD, COPD. He presents today w/out complaint. Pt verifies dosing regimen as listed above. Pt denies s/s bleeding/bruising/swelling/SOB. No BRBPR. No melena. Reviewed pt medication list.  No changes in OTC/herbal medications. Reviewed dietary concerns. Pt states that he does not eat a lot of greens. No missed doses. No changes per pt. INR 2.8 is within the acceptable therapeutic range of 2-3. Recommend to continue dose of 7.5 mg every day. Patient has 5 mg and 2 mg tablets. Will continue to monitor and check INR in 4 weeks. Dosing reminder card given with phone number, appointment date, and time.   Return to clinic: 4/12/23 @ 1100 AM    Robin NegreteD 10:39 AM EST 2/1/23

## 2023-03-07 ENCOUNTER — OFFICE VISIT (OUTPATIENT)
Dept: PULMONOLOGY | Age: 73
End: 2023-03-07
Payer: COMMERCIAL

## 2023-03-07 VITALS
HEIGHT: 70 IN | SYSTOLIC BLOOD PRESSURE: 113 MMHG | RESPIRATION RATE: 16 BRPM | WEIGHT: 218 LBS | HEART RATE: 77 BPM | BODY MASS INDEX: 31.21 KG/M2 | TEMPERATURE: 97.9 F | OXYGEN SATURATION: 96 % | DIASTOLIC BLOOD PRESSURE: 71 MMHG

## 2023-03-07 DIAGNOSIS — Z87.891 FORMER SMOKER: ICD-10-CM

## 2023-03-07 DIAGNOSIS — J41.0 SIMPLE CHRONIC BRONCHITIS (HCC): ICD-10-CM

## 2023-03-07 DIAGNOSIS — G47.33 OSA (OBSTRUCTIVE SLEEP APNEA): Primary | ICD-10-CM

## 2023-03-07 DIAGNOSIS — E66.9 OBESITY (BMI 30-39.9): Chronic | ICD-10-CM

## 2023-03-07 PROCEDURE — 1123F ACP DISCUSS/DSCN MKR DOCD: CPT | Performed by: INTERNAL MEDICINE

## 2023-03-07 PROCEDURE — G8484 FLU IMMUNIZE NO ADMIN: HCPCS | Performed by: INTERNAL MEDICINE

## 2023-03-07 PROCEDURE — 3074F SYST BP LT 130 MM HG: CPT | Performed by: INTERNAL MEDICINE

## 2023-03-07 PROCEDURE — 3017F COLORECTAL CA SCREEN DOC REV: CPT | Performed by: INTERNAL MEDICINE

## 2023-03-07 PROCEDURE — 1036F TOBACCO NON-USER: CPT | Performed by: INTERNAL MEDICINE

## 2023-03-07 PROCEDURE — 3078F DIAST BP <80 MM HG: CPT | Performed by: INTERNAL MEDICINE

## 2023-03-07 PROCEDURE — G8417 CALC BMI ABV UP PARAM F/U: HCPCS | Performed by: INTERNAL MEDICINE

## 2023-03-07 PROCEDURE — 3023F SPIROM DOC REV: CPT | Performed by: INTERNAL MEDICINE

## 2023-03-07 PROCEDURE — G8427 DOCREV CUR MEDS BY ELIG CLIN: HCPCS | Performed by: INTERNAL MEDICINE

## 2023-03-07 PROCEDURE — 99213 OFFICE O/P EST LOW 20 MIN: CPT | Performed by: INTERNAL MEDICINE

## 2023-03-07 ASSESSMENT — SLEEP AND FATIGUE QUESTIONNAIRES
HOW LIKELY ARE YOU TO NOD OFF OR FALL ASLEEP WHILE SITTING INACTIVE IN A PUBLIC PLACE: 0
HOW LIKELY ARE YOU TO NOD OFF OR FALL ASLEEP WHILE SITTING AND TALKING TO SOMEONE: 0
HOW LIKELY ARE YOU TO NOD OFF OR FALL ASLEEP WHEN YOU ARE A PASSENGER IN A CAR FOR AN HOUR WITHOUT A BREAK: 0
HOW LIKELY ARE YOU TO NOD OFF OR FALL ASLEEP WHILE LYING DOWN TO REST IN THE AFTERNOON WHEN CIRCUMSTANCES PERMIT: 2
HOW LIKELY ARE YOU TO NOD OFF OR FALL ASLEEP WHILE SITTING AND READING: 1
HOW LIKELY ARE YOU TO NOD OFF OR FALL ASLEEP WHILE SITTING QUIETLY AFTER LUNCH WITHOUT ALCOHOL: 1
ESS TOTAL SCORE: 4
HOW LIKELY ARE YOU TO NOD OFF OR FALL ASLEEP WHILE WATCHING TV: 0
HOW LIKELY ARE YOU TO NOD OFF OR FALL ASLEEP IN A CAR, WHILE STOPPED FOR A FEW MINUTES IN TRAFFIC: 0
NECK CIRCUMFERENCE (INCHES): 19.75

## 2023-03-07 NOTE — PROGRESS NOTES
C/O Pulmonary follow up and to discuss clinical status and options    Patient has come to the office for a pulmonary follow-up, patient states that overall he is doing reasonably well, patient states that he has occasional cough with white phlegm, patient does not have any increasing shortness of breath or wheezing, patient does have some blocked nose, patient does not have any significant chest pain or palpitations, patient does not have any fever or chills, patient states that he has occasional abdominal pain, patient has had some constipation for which she takes stool softener and Ex-Lax, patient does not have any increasing leg edema, patient states that he has bad peripheral neuropathy which is concerning to him which she is attributing to diabetes mellitus, patient also has some issues with her back and patient is going to a chiropractor soon, patient need for risk inhaler is only twice a week, patient likes his Advair inhaler, patient does not have any other pertinent review of system of concern    Previous HPIPatient has come to the office for a pulmonary follow-up, patient states that he does have some occasional cough with white secretions, patient has occasional wheezing, patient does have some blocked nose, patient also has had some extra beats along with that patient has some rib pain, patient states on on hot days in the temperature has been more than 95 °F patient has reduce his risk inhaler once or twice a day, patient does not have any significant fever or chills, no abdominal symptoms of concern, no new medications per se, patient does not have any increasing leg swelling, patient does not have any other pertinent review of system of concern     : 72-year-old male who has come to the office for a pulm evaluation, patient used to see Dr. Moose Mathew at [facility] pulmonology prior to this, patient states that he has been having some stuffiness of the ears and patient had gone to ENT who recommended that patient should have a repeat swallow testing, patient has some secretions which he cannot get up properly, once the secretions are expectorated there is light yellow in color, patient is exercise tolerance is limited to about 200 feet, patient also has occasional wheezing at times, patient does not have any significant fever or chills, no pleuritic chest pain, no reflux symptoms of concern, patient has back pain on a chronic basis, patient does not have any significant dysuria or hematuria, patient does have some constipation, patient has swelling of the lower extremities on dangling of the feet, patient also feels tired and having less energy, patient's has leg swelling which has gone down, patient has pets at home which are not new, patient has a gas placed heating system without any humidifier, patient had COVID-19 infection in November of last year, patient does not have any humidifier with a concentrator, no other pertinent review of system of concern        Review of Systems same as above     Physical Exam:  Height 5' 10\" (1.778 m), weight 218 lb (98.9 kg). '  Constitutional:  No acute distress. HENT:  Oropharynx is clear and moist. Nothyromegaly. Eyes:  Conjunctivae are normal. Pupils equal, round, and reactive to light. No scleral icterus. Neck: . No tracheal deviation present. No obviousthyroid mass. Short enlarged neck   Cardiovascular: Normal rate, regular rhythm, normal heart sounds. No right ventricular heave. No lower extremity edema. Pulmonary/Chest: No wheezes. No rales. Chest wall is not dull to percussion. No accessory muscle usage or stridor. Decreased BSI   Abdominal: Soft. Bowel sounds present. No distension or hernia. No tenderness. Musculoskeletal : No cyanosis. No clubbing. No obvious joint deformity. Lymphadenopathy: No cervical or supraclavicularadenopathy. Skin: Skin is warm and dry. No rash or nodules on the exposed extremities. Psychiatric: Normal mood and affect. Behavior is normal.  No anxiety. Neurologic : Alert, awake and oriented. [facility]. [facility]      Sleep Medicine Data:                                      Data:     Imaging:  I have reviewed radiology images personally. No orders to display     CTA OF THE CHEST 1/2/2019 3:54 am       TECHNIQUE:   CTA of the chest was performed after the administration of intravenous   contrast.  Multiplanar reformatted images are provided for review. MIP   images are provided for review. Dose modulation, iterative reconstruction,   and/or weight based adjustment of the mA/kV was utilized to reduce the   radiation dose to as low as reasonably achievable. COMPARISON:   10/27/2014       HISTORY:   ORDERING SYSTEM PROVIDED HISTORY: CHEST PAIN, CHRONIC, HIGH PROBABILITY OF CAD   TECHNOLOGIST PROVIDED HISTORY:   Ordering Physician Provided Reason for Exam: sternal pain with hx of afib   Acuity: Acute   Type of Exam: Initial       Midsternal chest pain       FINDINGS:   Pulmonary Arteries: Motion artifact limits evaluation at the lung bases. No   definite evidence of intraluminal filling defect to suggest pulmonary   embolism. Main pulmonary artery is normal in caliber. Mediastinum: No evidence of mediastinal lymphadenopathy. The heart and   pericardium demonstrate no acute abnormality. Coronary artery calcifications   are noted. There is no acute abnormality of the thoracic aorta. Lungs/pleura: There is no pneumothorax or pleural effusion. The central   airways are patent. There are bilateral upper lobe poorly defined   ground-glass opacities. Upper Abdomen: Limited images of the upper abdomen are unremarkable. Soft Tissues/Bones: No acute bone or soft tissue abnormality. Impression   1. Limited study with no definite scan evidence for pulmonary embolus. 2. Coronary artery disease. 3. Ground-glass opacities are nonspecific though are most likely infectious   or inflammatory. ECHO in past -Summary   This is a limited study for A-fib follow-up. Left ventricular systolic function is mild to moderately reduced with   ejection fraction estimated at 35-40 %. There is moderate global hypokinesis, difficult to evaluate LV Function due   to afib. PFT in 2012-  PFTs 10/9/12  FVC 4.34 (91%) FEV1 3.40 (94%) FEV1/FVC ratio 78%  TLC 5.92 (82%) DLCO 25.9 (82%) no bd response  6MWT 1400 feet, low sat 94%    Patient's compliance data for CLARISSE's states that patient uses CPAP/BiPAP machine only on 11 days out of 30, patient uses a the machine more than 4 hours was only 23%, patient's average usage on all days used was 1 hour and 45 minutes, patient has AHI of 5.2/h along with that patient has no Cheyne-Narayan respiration    Patient's CPAP compliance data shows patient use the CPAP machine only on 24 days of 90 plan patient use of CPAP machine more than 4 hours of only 19%, patient's AHI has come down to 2.1/h    Assessment:    1. Simple chronic bronchitis ([address]    2. CLARISSE (obstructive sleep apnea)      3. Former smoker      4. Coronary artery calcification seen on CT scan      5.  Cardiomyopathy, unspecified type [facility])                Plan:   Patient's review of system were discussed  Patient was told about the clinical findings during auscultation and implications  Patient was told about the pathophysiology of the disease process and its modifying factors  Patient was told about the consult for hypoxemia and hypercarbia  Patient can continue with Advair inhaler 1 puff twice a day and to rinse mouth with water after use otherwise he can have hoarseness of voice oral thrush  Patient to take albuterol inhaler 2 puffs every 6 on whenever necessary basis  No need for any anticholinergic at this time  Patient is not compliant with usage of this CPAP/BiPAP and patient states that he falls asleep in the recliner and downstairs and the machine is upstairs in the bed-patient was told to bring down the CPAP machine downstairs when he has a stair flight recliner which may be helpful patient states that he will try to do so  Patient can be subjected to repeat PFT if the patient so desires but wants to defer it for now  Diet and lifestyle modification discussed  Cardiac medications as per cardiology  Patient is getting diuretics  Weight loss will help  Regular regimented exercise will help  Synthroid and PPI as per IM

## 2023-03-07 NOTE — LETTER
March 7, 2023      Poppy Matthews, [address]      Patient: Faye Penaloza   MR Number: [id number]   YOB: 1950   Date of Visit: 3/7/2023       Dear Dr. Sylvain López:    Thank you for referring He Quach to me for evaluation/treatment. Below are the relevant portions of my assessment and plan of care. If you have questions, please do not hesitate to call me. I look forward to following Ashley Vidal along with you.     Sincerely,        Henrry Allred MD    CC providers:  Poppy Matthews, [address] [address]  [place] [place] [employer]

## 2023-03-07 NOTE — LETTER
March 7, 2023      Michi Cleveland, [address]      Patient: Dominique Adair   MR Number: [id number]   YOB: 1950   Date of Visit: 3/7/2023       Dear Dr. Gem Valdez:    Thank you for referring Rosibel Higgins to me for evaluation/treatment. Below are the relevant portions of my assessment and plan of care. If you have questions, please do not hesitate to call me. I look forward to following Jarrod Hernandez along with you.     Sincerely,        Giancarlo Rawls MD    CC providers:  Michi Cleveland, [address] [address]  [place]

## 2023-03-07 NOTE — LETTER
March 7, 2023      Jesus Hewitt, [address] [place]      Patient: Ayush Cisneros   MR Number: [id number]   YOB: 1950   Date of Visit: 3/7/2023       Dear Jesus Hewitt:    Thank you for referring Ronald Oviedo to me for evaluation/treatment. Below are the relevant portions of my assessment and plan of care. If you have questions, please do not hesitate to call me. I look forward to following Juwan Dallas along with you.     Sincerely,        Lydia Mendenhall MD

## 2023-03-07 NOTE — PROGRESS NOTES
MA Communication:   The following orders are received by verbal communication from Birgit Sadler MD    Orders include:    6 month sleep/Lungs f/u

## 2023-03-24 RX ORDER — SILDENAFIL 100 MG/1
100 TABLET, FILM COATED ORAL PRN
Qty: 18 TABLET | Refills: 3 | Status: SHIPPED | OUTPATIENT
Start: 2023-03-24

## 2023-03-24 NOTE — TELEPHONE ENCOUNTER
The wife called and wanted to refill the viagra and the patient was last seen on 12/30/2022.   There is no future appt, please advise rc   They use the mail order [facility]

## 2023-05-22 ENCOUNTER — OFFICE VISIT (OUTPATIENT)
Dept: FAMILY MEDICINE CLINIC | Age: 73
End: 2023-05-22

## 2023-05-22 VITALS
WEIGHT: 222 LBS | BODY MASS INDEX: 31.85 KG/M2 | OXYGEN SATURATION: 96 % | HEART RATE: 80 BPM | DIASTOLIC BLOOD PRESSURE: 79 MMHG | SYSTOLIC BLOOD PRESSURE: 130 MMHG

## 2023-05-22 DIAGNOSIS — I48.20 CHRONIC ATRIAL FIBRILLATION (HCC): ICD-10-CM

## 2023-05-22 DIAGNOSIS — I10 ESSENTIAL HYPERTENSION: ICD-10-CM

## 2023-05-22 DIAGNOSIS — Z79.4 TYPE 2 DIABETES MELLITUS WITH DIABETIC POLYNEUROPATHY, WITH LONG-TERM CURRENT USE OF INSULIN (HCC): Primary | ICD-10-CM

## 2023-05-22 DIAGNOSIS — E11.42 TYPE 2 DIABETES MELLITUS WITH DIABETIC POLYNEUROPATHY, WITH LONG-TERM CURRENT USE OF INSULIN (HCC): Primary | ICD-10-CM

## 2023-05-22 DIAGNOSIS — E78.2 MIXED HYPERLIPIDEMIA: ICD-10-CM

## 2023-05-22 DIAGNOSIS — J45.40 MODERATE PERSISTENT ASTHMA WITHOUT COMPLICATION: ICD-10-CM

## 2023-05-22 DIAGNOSIS — I42.9 CARDIOMYOPATHY, UNSPECIFIED TYPE (HCC): ICD-10-CM

## 2023-05-22 DIAGNOSIS — E03.9 ACQUIRED HYPOTHYROIDISM: ICD-10-CM

## 2023-05-22 PROBLEM — E53.8 VITAMIN B12 DEFICIENCY: Status: ACTIVE | Noted: 2020-12-02

## 2023-05-22 RX ORDER — SODIUM FLUORIDE 6 MG/ML
PASTE, DENTIFRICE DENTAL
COMMUNITY
Start: 2023-05-17

## 2023-05-22 RX ORDER — AMOXICILLIN 500 MG/1
TABLET, FILM COATED ORAL
COMMUNITY
Start: 2023-05-17

## 2023-05-22 RX ORDER — TIRZEPATIDE 5 MG/.5ML
INJECTION, SOLUTION SUBCUTANEOUS
COMMUNITY
Start: 2023-02-27

## 2023-05-22 RX ORDER — INSULIN LISPRO 100 [IU]/ML
INJECTION, SOLUTION INTRAVENOUS; SUBCUTANEOUS
COMMUNITY
Start: 2023-02-23

## 2023-05-22 ASSESSMENT — PATIENT HEALTH QUESTIONNAIRE - PHQ9
SUM OF ALL RESPONSES TO PHQ QUESTIONS 1-9: 0
1. LITTLE INTEREST OR PLEASURE IN DOING THINGS: 0
SUM OF ALL RESPONSES TO PHQ QUESTIONS 1-9: 0
SUM OF ALL RESPONSES TO PHQ9 QUESTIONS 1 & 2: 0
2. FEELING DOWN, DEPRESSED OR HOPELESS: 0

## 2023-05-22 NOTE — PATIENT INSTRUCTIONS
Please read the healthy family handout that you were given and share it with your family. Please compare this printed medication list with your medications at home to be sure they are the same. If you have any medications that are different please contact us immediately at [phone]. Also review your allergies that we have listed, these may also include medications that you have not been able to tolerate, make sure everything listed is correct. If you have any allergies that are different please contact us immediately at [phone]. You may receive a survey in the mail or by email asking about your experience during your visit today. Please complete and return to us so we know how we are serving you.

## 2023-05-22 NOTE — PROGRESS NOTES
He presents today for follow-up of his diabetes heart issues high blood pressure thyroid and asthma and high cholesterol. He had all his blood work done by endocrinology in February which I reviewed. His lipids were under good control his A1c was 6.7% and thyroid levels were good. He is going to Dr. Chelly Herrmann the chronic pain specialist to try something new for his neuropathy pain. He currently takes Cymbalta and gabapentin which helps a little. Recently he has had issues with tendinitis that comes and goes. He states he still has constipation on his med list that states that he is on Trulance for this but he is not sure if he is taking this because his wife does his medications that he will check when he gets home  Tests and documents reviewed today: I reviewed the last note and lab work done by Dr. Santiago Millan his endocrinologist     Objective:   /79   Pulse 80   Wt 222 lb (100.7 kg)   SpO2 96%   BMI 31.85 kg/m²   BP Readings from Last 3 Encounters:   05/22/23 130/79   03/07/23 113/71   12/30/22 115/80     Physical Exam  Vitals reviewed. Constitutional:       Appearance: He is well-developed. He is not toxic-appearing. HENT:      Head: Normocephalic. Neck:      Thyroid: No thyroid mass or thyromegaly. Cardiovascular:      Rate and Rhythm: Normal rate and regular rhythm. Heart sounds: Normal heart sounds. No murmur heard. Pulmonary:      Effort: No respiratory distress. Breath sounds: Normal breath sounds. No wheezing or rales. Abdominal:      General: There is no distension. Palpations: Abdomen is soft. There is no mass. Tenderness: There is no abdominal tenderness. There is no guarding or rebound. Musculoskeletal:      Cervical back: Neck supple. Lymphadenopathy:      Cervical: No cervical adenopathy. Upper Body:      Right upper body: No supraclavicular adenopathy. Skin:     General: Skin is warm.    Neurological:      Mental Status: He is alert and oriented to

## 2023-05-23 DIAGNOSIS — I48.20 CHRONIC ATRIAL FIBRILLATION (HCC): Chronic | ICD-10-CM

## 2023-05-25 RX ORDER — WARFARIN SODIUM 2 MG/1
2 TABLET ORAL DAILY
Qty: 90 TABLET | Refills: 3 | Status: SHIPPED | OUTPATIENT
Start: 2023-05-25

## 2023-05-30 DIAGNOSIS — E11.40 DIABETIC NEUROPATHY, PAINFUL (HCC): ICD-10-CM

## 2023-05-30 RX ORDER — DULOXETIN HYDROCHLORIDE 60 MG/1
60 CAPSULE, DELAYED RELEASE ORAL DAILY
Qty: 90 CAPSULE | Refills: 3 | Status: SHIPPED | OUTPATIENT
Start: 2023-05-30

## 2023-05-30 RX ORDER — GABAPENTIN 300 MG/1
CAPSULE ORAL
Qty: 90 CAPSULE | Refills: 3 | Status: SHIPPED | OUTPATIENT
Start: 2023-05-30 | End: 2023-06-29

## 2023-05-30 NOTE — TELEPHONE ENCOUNTER
Date of last refill of this med was 12/30/22, # of pills given 90 and # of refills given 1. Their next appointment is None, the last date patient was seen was 5/22/23. Does patient have medication agreement on file? No  Has drug screen been done in last 12 months if needed?  N/A

## 2023-06-20 ENCOUNTER — TELEPHONE (OUTPATIENT)
Dept: FAMILY MEDICINE CLINIC | Age: 73
End: 2023-06-20

## 2023-06-20 NOTE — TELEPHONE ENCOUNTER
Patient is needing new order for 2 [address] [place] [address] to give to patient?

## 2023-06-21 ENCOUNTER — TELEPHONE (OUTPATIENT)
Dept: FAMILY MEDICINE CLINIC | Age: 73
End: 2023-06-21

## 2023-06-21 NOTE — TELEPHONE ENCOUNTER
Wife was in today. She said that Pedro Garner is having a procedure done on 7/21/23. It's a trial stimulator for his neuropathy. They will use twilight sedation. Asking for something in writing that will clear him or the procedure.

## 2023-06-29 ENCOUNTER — TELEPHONE (OUTPATIENT)
Dept: PULMONOLOGY | Age: 73
End: 2023-06-29

## 2023-06-29 ENCOUNTER — TELEPHONE (OUTPATIENT)
Dept: CARDIOLOGY CLINIC | Age: 73
End: 2023-06-29

## 2023-06-30 ENCOUNTER — TELEPHONE (OUTPATIENT)
Dept: FAMILY MEDICINE CLINIC | Age: 73
End: 2023-06-30

## 2023-07-14 ENCOUNTER — HOSPITAL ENCOUNTER (OUTPATIENT)
Age: 73
Discharge: HOME | End: 2023-07-14
Payer: COMMERCIAL

## 2023-07-14 ENCOUNTER — HOSPITAL ENCOUNTER (OUTPATIENT)
Dept: GENERAL RADIOLOGY | Age: 73
Discharge: HOME | End: 2023-07-14
Payer: COMMERCIAL

## 2023-07-14 DIAGNOSIS — R14.0 ABDOMINAL DISTENTION: ICD-10-CM

## 2023-07-14 LAB
ANION GAP SERPL CALCULATED.3IONS-SCNC: 11 MMOL/L (ref 3–16)
BASOPHILS # BLD: 0.1 K/UL (ref 0–0.2)
BASOPHILS NFR BLD: 1.1 %
BUN SERPL-MCNC: 15 MG/DL (ref 7–20)
CALCIUM SERPL-MCNC: 9.2 MG/DL (ref 8.3–10.6)
CHLORIDE SERPL-SCNC: 104 MMOL/L (ref 99–110)
CO2 SERPL-SCNC: 24 MMOL/L (ref 21–32)
CREAT SERPL-MCNC: 1.1 MG/DL (ref 0.8–1.3)
DEPRECATED RDW RBC AUTO: 13.9 % (ref 12.4–15.4)
EOSINOPHIL # BLD: 0.3 K/UL (ref 0–0.6)
EOSINOPHIL NFR BLD: 3 %
GFR SERPLBLD CREATININE-BSD FMLA CKD-EPI: >60 ML/MIN/{1.73_M2}
GLUCOSE SERPL-MCNC: 122 MG/DL (ref 70–99)
HCT VFR BLD AUTO: 49.6 % (ref 40.5–52.5)
HGB BLD-MCNC: 16.9 G/DL (ref 13.5–17.5)
INR PPP: 2.04 (ref 0.84–1.16)
LIPASE SERPL-CCNC: 18 U/L (ref 13–60)
LYMPHOCYTES # BLD: 3.5 K/UL (ref 1–5.1)
LYMPHOCYTES NFR BLD: 34.7 %
MCH RBC QN AUTO: 31 PG (ref 26–34)
MCHC RBC AUTO-ENTMCNC: 34 G/DL (ref 31–36)
MCV RBC AUTO: 91.2 FL (ref 80–100)
MONOCYTES # BLD: 0.6 K/UL (ref 0–1.3)
MONOCYTES NFR BLD: 6.4 %
NEUTROPHILS # BLD: 5.5 K/UL (ref 1.7–7.7)
NEUTROPHILS NFR BLD: 54.8 %
PLATELET # BLD AUTO: 189 K/UL (ref 135–450)
PMV BLD AUTO: 9 FL (ref 5–10.5)
POTASSIUM SERPL-SCNC: 4.1 MMOL/L (ref 3.5–5.1)
PROTHROMBIN TIME: 22.9 SEC (ref 11.5–14.8)
RBC # BLD AUTO: 5.44 M/UL (ref 4.2–5.9)
SODIUM SERPL-SCNC: 139 MMOL/L (ref 136–145)
WBC # BLD AUTO: 10.1 K/UL (ref 4–11)

## 2023-07-14 PROCEDURE — 83690 ASSAY OF LIPASE: CPT

## 2023-07-14 PROCEDURE — 85610 PROTHROMBIN TIME: CPT

## 2023-07-14 PROCEDURE — 85025 COMPLETE CBC W/AUTO DIFF WBC: CPT

## 2023-07-14 PROCEDURE — 80048 BASIC METABOLIC PNL TOTAL CA: CPT

## 2023-07-14 PROCEDURE — 74018 RADEX ABDOMEN 1 VIEW: CPT

## 2023-08-09 ENCOUNTER — ANTI-COAG VISIT (OUTPATIENT)
Dept: PHARMACY | Age: 73
End: 2023-08-09
Payer: MEDICARE

## 2023-08-09 DIAGNOSIS — I48.20 CHRONIC ATRIAL FIBRILLATION (HCC): Primary | Chronic | ICD-10-CM

## 2023-08-09 LAB
CATARACTS: NEGATIVE
DIABETIC RETINOPATHY: NEGATIVE
GLAUCOMA: NEGATIVE
INTERNATIONAL NORMALIZATION RATIO, POC: 2.2
INTRAOCULAR PRESSURE EYE: 11
VISUAL ACUITY DISTANCE LEFT EYE: NORMAL

## 2023-08-09 PROCEDURE — 99211 OFF/OP EST MAY X REQ PHY/QHP: CPT | Performed by: PHARMACIST

## 2023-08-09 PROCEDURE — 85610 PROTHROMBIN TIME: CPT | Performed by: PHARMACIST

## 2023-08-09 NOTE — PROGRESS NOTES
is here for management of anticoagulation for AFib. PMH also significant for DM, Hyopthyroid, GERD, HTN, CAD, COPD. He presents today w/out complaint. Pt verifies dosing regimen as listed above. Pt denies s/s bleeding/bruising/swelling/SOB. No BRBPR. No melena. Reviewed pt medication list.  No changes in OTC/herbal medications. Reviewed dietary concerns. Pt states that he does not eat a lot of greens. No missed doses. No changes per pt. INR 2.2 is within the acceptable therapeutic range of 2-3. Recommend to continue dose of 7.5 mg every day. Patient has 5 mg and 2 mg tablets. Will continue to monitor and check INR in 5 weeks. Dosing reminder card given with phone number, appointment date, and time.   Return to clinic: 9/13/23 @ 1000 AM    Deshaun Bardales, PharmD 8:47 AM EDT 8/9/23

## 2023-08-10 LAB
CATARACTS: NEGATIVE
DIABETIC RETINOPATHY: NEGATIVE
GLAUCOMA: NEGATIVE
INTRAOCULAR PRESSURE EYE: 11
VISUAL ACUITY DISTANCE LEFT EYE: NORMAL

## 2023-08-24 RX ORDER — PANTOPRAZOLE SODIUM 40 MG/1
40 TABLET, DELAYED RELEASE ORAL DAILY
Qty: 90 TABLET | Refills: 3 | Status: SHIPPED | OUTPATIENT
Start: 2023-08-24

## 2023-08-30 ENCOUNTER — HOSPITAL ENCOUNTER (OUTPATIENT)
Age: 73
Discharge: HOME | End: 2023-08-30
Payer: COMMERCIAL

## 2023-08-30 LAB
ALBUMIN SERPL-MCNC: 4.2 G/DL (ref 3.4–5)
ALBUMIN/GLOB SERPL: 1.4 {RATIO} (ref 1.1–2.2)
ALP SERPL-CCNC: 71 U/L (ref 40–129)
ALT SERPL-CCNC: 11 U/L (ref 10–40)
ANION GAP SERPL CALCULATED.3IONS-SCNC: 11 MMOL/L (ref 3–16)
AST SERPL-CCNC: 22 U/L (ref 15–37)
BILIRUB SERPL-MCNC: 0.6 MG/DL (ref 0–1)
BUN SERPL-MCNC: 20 MG/DL (ref 7–20)
CALCIUM SERPL-MCNC: 9.2 MG/DL (ref 8.3–10.6)
CHLORIDE SERPL-SCNC: 105 MMOL/L (ref 99–110)
CHOLEST SERPL-MCNC: 138 MG/DL (ref 0–199)
CO2 SERPL-SCNC: 22 MMOL/L (ref 21–32)
CREAT SERPL-MCNC: 0.9 MG/DL (ref 0.8–1.3)
CREAT UR-MCNC: 123.9 MG/DL (ref 39–259)
GFR SERPLBLD CREATININE-BSD FMLA CKD-EPI: >60 ML/MIN/{1.73_M2}
GLUCOSE SERPL-MCNC: 107 MG/DL (ref 70–99)
HDLC SERPL-MCNC: 32 MG/DL (ref 40–60)
LDLC SERPL CALC-MCNC: 79 MG/DL
MICROALBUMIN UR DL<=1MG/L-MCNC: <1.2 MG/DL
MICROALBUMIN/CREAT UR: NORMAL MG/G (ref 0–30)
POTASSIUM SERPL-SCNC: 4.2 MMOL/L (ref 3.5–5.1)
PROT SERPL-MCNC: 7.1 G/DL (ref 6.4–8.2)
SODIUM SERPL-SCNC: 138 MMOL/L (ref 136–145)
T3FREE SERPL-MCNC: 2.3 PG/ML (ref 2.3–4.2)
T4 FREE SERPL-MCNC: 1.3 NG/DL (ref 0.9–1.8)
TRIGL SERPL-MCNC: 134 MG/DL (ref 0–150)
TSH SERPL DL<=0.005 MIU/L-ACNC: 2.67 UIU/ML (ref 0.27–4.2)
VLDLC SERPL CALC-MCNC: 27 MG/DL

## 2023-08-30 PROCEDURE — 84443 ASSAY THYROID STIM HORMONE: CPT

## 2023-08-30 PROCEDURE — 82570 ASSAY OF URINE CREATININE: CPT

## 2023-08-30 PROCEDURE — 82043 UR ALBUMIN QUANTITATIVE: CPT

## 2023-08-30 PROCEDURE — 84425 ASSAY OF VITAMIN B-1: CPT

## 2023-08-30 PROCEDURE — 84481 FREE ASSAY (FT-3): CPT

## 2023-08-30 PROCEDURE — 80061 LIPID PANEL: CPT

## 2023-08-30 PROCEDURE — 84439 ASSAY OF FREE THYROXINE: CPT

## 2023-08-30 PROCEDURE — 80053 COMPREHEN METABOLIC PANEL: CPT

## 2023-08-30 PROCEDURE — 83036 HEMOGLOBIN GLYCOSYLATED A1C: CPT

## 2023-08-31 LAB
EST. AVERAGE GLUCOSE BLD GHB EST-MCNC: 131.2 MG/DL
HBA1C MFR BLD: 6.2 %

## 2023-09-04 LAB — VIT B1 SERPL-MCNC: 1962 NMOL/L (ref 4–15)

## 2023-09-06 RX ORDER — WARFARIN SODIUM 5 MG/1
7.5 TABLET ORAL DAILY
Qty: 135 TABLET | Refills: 3 | Status: SHIPPED | OUTPATIENT
Start: 2023-09-06

## 2023-09-13 ENCOUNTER — ANTI-COAG VISIT (OUTPATIENT)
Dept: PHARMACY | Age: 73
End: 2023-09-13
Payer: MEDICARE

## 2023-09-13 DIAGNOSIS — I48.20 CHRONIC ATRIAL FIBRILLATION (HCC): Primary | Chronic | ICD-10-CM

## 2023-09-13 LAB — INTERNATIONAL NORMALIZATION RATIO, POC: 2.8

## 2023-09-13 PROCEDURE — 99211 OFF/OP EST MAY X REQ PHY/QHP: CPT

## 2023-09-13 PROCEDURE — 85610 PROTHROMBIN TIME: CPT

## 2023-09-13 NOTE — PROGRESS NOTES
is here for management of anticoagulation for AFib. PMH also significant for DM, Hyopthyroid, GERD, HTN, CAD, COPD. He presents today w/out complaint. Pt verifies dosing regimen as listed above. Pt denies s/s bleeding/bruising/swelling/SOB. No BRBPR. No melena. Reviewed pt medication list.  No changes in OTC/herbal medications. Reviewed dietary concerns. Pt states that he does not eat a lot of greens. No missed doses. No changes per pt. INR 2.8 is within the acceptable therapeutic range of 2-3. Recommend to continue dose of 7.5 mg every day. Patient has 5 mg and 2 mg tablets. Will continue to monitor and check INR in 6 weeks. Dosing reminder card given with phone number, appointment date, and time.   Return to clinic: 10/25/23 @ [address], PharmD  9/13/2023 at 9:47 AM

## 2023-10-09 RX ORDER — FENOFIBRATE 54 MG/1
54 TABLET ORAL DAILY
Qty: 90 TABLET | Refills: 3 | Status: SHIPPED | OUTPATIENT
Start: 2023-10-09

## 2023-11-01 ENCOUNTER — ANTI-COAG VISIT (OUTPATIENT)
Dept: PHARMACY | Age: 73
End: 2023-11-01
Payer: MEDICARE

## 2023-11-01 DIAGNOSIS — I48.20 CHRONIC ATRIAL FIBRILLATION (HCC): Primary | Chronic | ICD-10-CM

## 2023-11-01 LAB — INTERNATIONAL NORMALIZATION RATIO, POC: 2.5

## 2023-11-01 PROCEDURE — 85610 PROTHROMBIN TIME: CPT | Performed by: PHARMACIST

## 2023-11-01 PROCEDURE — 99211 OFF/OP EST MAY X REQ PHY/QHP: CPT | Performed by: PHARMACIST

## 2023-11-01 NOTE — PROGRESS NOTES
is here for management of anticoagulation for AFib. PMH also significant for DM, Hyopthyroid, GERD, HTN, CAD, COPD. He presents today w/out complaint. Pt verifies dosing regimen as listed above. Pt denies s/s bleeding/bruising/swelling/SOB. No BRBPR. No melena. Reviewed pt medication list.  No changes in OTC/herbal medications. Reviewed dietary concerns. Pt states that he does not eat a lot of greens. No missed doses. No changes per pt. INR 2.5 is within the acceptable therapeutic range of 2-3. Recommend to continue dose of 7.5 mg every day. Patient has 5 mg and 2 mg tablets. Will continue to monitor and check INR in 6 weeks. Dosing reminder card given with phone number, appointment date, and time.   Return to clinic: 23 @ 1000 AM    Jameel Ballard, PharmD 10:03 AM EDT 23    For Pharmacy Admin Tracking Only    Intervention Detail: Adherence Monitorin  Total # of Interventions Recommended: 1  Total # of Interventions Accepted: 1  Time Spent (min): 15

## 2023-11-29 DIAGNOSIS — I10 ESSENTIAL HYPERTENSION: Chronic | ICD-10-CM

## 2023-11-29 RX ORDER — METOPROLOL SUCCINATE 50 MG/1
TABLET, EXTENDED RELEASE ORAL
Qty: 90 TABLET | Refills: 0 | Status: SHIPPED | OUTPATIENT
Start: 2023-11-29

## 2023-12-02 RX ORDER — WARFARIN SODIUM 5 MG/1
7.5 TABLET ORAL DAILY
Qty: 135 TABLET | Refills: 3 | Status: SHIPPED | OUTPATIENT
Start: 2023-12-02

## 2023-12-04 NOTE — PROGRESS NOTES
kg (215 lb)   12/05/23 97.5 kg (215 lb)   06/12/23 99.8 kg (220 lb)     Constitutional and General Appearance: NAD;   Respiratory:  Normal excursion and expansion without use of accessory muscles  Resp Auscultation: fine crackles right base  Cardiovascular: The apical impulses not displaced  Heart tones are crisp and normal  Cervical veins are not engorged  The carotid upstroke is normal in amplitude and contour without delay or bruit  Irregularly irregular, No S3, No Murmur  Peripheral pulses are symmetrical and full  There is no clubbing, cyanosis of the extremities.   no edema  Femoral Arteries: 2+ and equal  Pedal Pulses: 2+ and equal   Abdomen:  No masses or tenderness  Liver/Spleen: No Abnormalities Noted  Neurological/Psychiatric:  Alert and oriented in all spheres  Moves all extremities well  Exhibits normal gait balance and coordination  No abnormalities of mood, affect, memory, mentation, or behavior are noted      LABS:  Lab Results   Component Value Date     12/05/2023    K 4.0 12/05/2023     12/05/2023    CO2 19 (L) 12/05/2023    BUN 15 12/05/2023    CREATININE 1.1 12/05/2023    GLUCOSE 82 12/05/2023    CALCIUM 8.9 12/05/2023    PROT 7.1 08/30/2023    LABALBU 4.2 08/30/2023    BILITOT 0.6 08/30/2023    ALKPHOS 71 08/30/2023    AST 22 08/30/2023    ALT 16 12/05/2023    LABGLOM >60 12/05/2023    GFRAA >60 08/12/2022    AGRATIO 1.4 08/30/2023    GLOB 2.6 08/02/2021     Lab Results   Component Value Date    WBC 11.1 (H) 12/05/2023    HGB 16.9 12/05/2023    HCT 49.5 12/05/2023    MCV 89.9 12/05/2023     12/05/2023     Lab Results   Component Value Date    TSH 2.67 08/30/2023    FT3 2.3 08/30/2023    T4FREE 1.3 08/30/2023     Hemoglobin A1C   Date Value Ref Range Status   12/05/2023 6.1 See comment % Final     Comment:     Comment:  Diagnosis of Diabetes: > or = 6.5%  Increased risk of diabetes (Prediabetes): 5.7-6.4%  Glycemic Control: Nonpregnant Adults: <7.0%                    Pregnant:

## 2023-12-05 ENCOUNTER — OFFICE VISIT (OUTPATIENT)
Dept: FAMILY MEDICINE CLINIC | Age: 73
End: 2023-12-05
Payer: MEDICARE

## 2023-12-05 VITALS
WEIGHT: 215 LBS | HEART RATE: 76 BPM | OXYGEN SATURATION: 95 % | BODY MASS INDEX: 30.85 KG/M2 | DIASTOLIC BLOOD PRESSURE: 60 MMHG | SYSTOLIC BLOOD PRESSURE: 95 MMHG

## 2023-12-05 DIAGNOSIS — I10 ESSENTIAL HYPERTENSION: Chronic | ICD-10-CM

## 2023-12-05 DIAGNOSIS — R42 LIGHTHEADEDNESS: ICD-10-CM

## 2023-12-05 DIAGNOSIS — E11.42 TYPE 2 DIABETES MELLITUS WITH DIABETIC POLYNEUROPATHY, WITH LONG-TERM CURRENT USE OF INSULIN (HCC): Primary | ICD-10-CM

## 2023-12-05 DIAGNOSIS — I48.20 CHRONIC ATRIAL FIBRILLATION (HCC): Chronic | ICD-10-CM

## 2023-12-05 DIAGNOSIS — Z23 NEEDS FLU SHOT: ICD-10-CM

## 2023-12-05 DIAGNOSIS — E03.9 ACQUIRED HYPOTHYROIDISM: Chronic | ICD-10-CM

## 2023-12-05 DIAGNOSIS — Z79.4 TYPE 2 DIABETES MELLITUS WITH DIABETIC POLYNEUROPATHY, WITH LONG-TERM CURRENT USE OF INSULIN (HCC): Primary | ICD-10-CM

## 2023-12-05 DIAGNOSIS — E78.2 MIXED HYPERLIPIDEMIA: Chronic | ICD-10-CM

## 2023-12-05 LAB
ALT SERPL-CCNC: 16 U/L (ref 10–40)
ANION GAP SERPL CALCULATED.3IONS-SCNC: 14 MMOL/L (ref 3–16)
BUN SERPL-MCNC: 15 MG/DL (ref 7–20)
CALCIUM SERPL-MCNC: 8.9 MG/DL (ref 8.3–10.6)
CHLORIDE SERPL-SCNC: 106 MMOL/L (ref 99–110)
CHOLEST SERPL-MCNC: 129 MG/DL (ref 0–199)
CO2 SERPL-SCNC: 19 MMOL/L (ref 21–32)
CREAT SERPL-MCNC: 1.1 MG/DL (ref 0.8–1.3)
DEPRECATED RDW RBC AUTO: 13.6 % (ref 12.4–15.4)
GFR SERPLBLD CREATININE-BSD FMLA CKD-EPI: >60 ML/MIN/{1.73_M2}
GLUCOSE SERPL-MCNC: 82 MG/DL (ref 70–99)
HCT VFR BLD AUTO: 49.5 % (ref 40.5–52.5)
HDLC SERPL-MCNC: 35 MG/DL (ref 40–60)
HGB BLD-MCNC: 16.9 G/DL (ref 13.5–17.5)
LDLC SERPL CALC-MCNC: 68 MG/DL
MCH RBC QN AUTO: 30.7 PG (ref 26–34)
MCHC RBC AUTO-ENTMCNC: 34.1 G/DL (ref 31–36)
MCV RBC AUTO: 89.9 FL (ref 80–100)
PLATELET # BLD AUTO: 187 K/UL (ref 135–450)
PMV BLD AUTO: 8.6 FL (ref 5–10.5)
POTASSIUM SERPL-SCNC: 4 MMOL/L (ref 3.5–5.1)
RBC # BLD AUTO: 5.5 M/UL (ref 4.2–5.9)
SODIUM SERPL-SCNC: 139 MMOL/L (ref 136–145)
TRIGL SERPL-MCNC: 131 MG/DL (ref 0–150)
TSH SERPL DL<=0.005 MIU/L-ACNC: 2.16 UIU/ML (ref 0.27–4.2)
VLDLC SERPL CALC-MCNC: 26 MG/DL
WBC # BLD AUTO: 11.1 K/UL (ref 4–11)

## 2023-12-05 PROCEDURE — 3044F HG A1C LEVEL LT 7.0%: CPT | Performed by: FAMILY MEDICINE

## 2023-12-05 PROCEDURE — 3078F DIAST BP <80 MM HG: CPT | Performed by: FAMILY MEDICINE

## 2023-12-05 PROCEDURE — 3074F SYST BP LT 130 MM HG: CPT | Performed by: FAMILY MEDICINE

## 2023-12-05 PROCEDURE — 90694 VACC AIIV4 NO PRSRV 0.5ML IM: CPT | Performed by: FAMILY MEDICINE

## 2023-12-05 PROCEDURE — G8484 FLU IMMUNIZE NO ADMIN: HCPCS | Performed by: FAMILY MEDICINE

## 2023-12-05 PROCEDURE — G8417 CALC BMI ABV UP PARAM F/U: HCPCS | Performed by: FAMILY MEDICINE

## 2023-12-05 PROCEDURE — 1123F ACP DISCUSS/DSCN MKR DOCD: CPT | Performed by: FAMILY MEDICINE

## 2023-12-05 PROCEDURE — 2022F DILAT RTA XM EVC RTNOPTHY: CPT | Performed by: FAMILY MEDICINE

## 2023-12-05 PROCEDURE — 99214 OFFICE O/P EST MOD 30 MIN: CPT | Performed by: FAMILY MEDICINE

## 2023-12-05 PROCEDURE — 1036F TOBACCO NON-USER: CPT | Performed by: FAMILY MEDICINE

## 2023-12-05 PROCEDURE — 36415 COLL VENOUS BLD VENIPUNCTURE: CPT | Performed by: FAMILY MEDICINE

## 2023-12-05 PROCEDURE — G8427 DOCREV CUR MEDS BY ELIG CLIN: HCPCS | Performed by: FAMILY MEDICINE

## 2023-12-05 PROCEDURE — G0008 ADMIN INFLUENZA VIRUS VAC: HCPCS | Performed by: FAMILY MEDICINE

## 2023-12-05 PROCEDURE — 3017F COLORECTAL CA SCREEN DOC REV: CPT | Performed by: FAMILY MEDICINE

## 2023-12-05 RX ORDER — WARFARIN SODIUM 2 MG/1
2 TABLET ORAL DAILY
Qty: 90 TABLET | Refills: 3 | Status: SHIPPED | OUTPATIENT
Start: 2023-12-05

## 2023-12-05 RX ORDER — WARFARIN SODIUM 1 MG/1
1 TABLET ORAL DAILY
Qty: 90 TABLET | Refills: 3 | Status: SHIPPED | OUTPATIENT
Start: 2023-12-05

## 2023-12-06 LAB
EST. AVERAGE GLUCOSE BLD GHB EST-MCNC: 128.4 MG/DL
HBA1C MFR BLD: 6.1 %

## 2023-12-11 ENCOUNTER — OFFICE VISIT (OUTPATIENT)
Dept: CARDIOLOGY CLINIC | Age: 73
End: 2023-12-11
Payer: MEDICARE

## 2023-12-11 VITALS
HEART RATE: 64 BPM | DIASTOLIC BLOOD PRESSURE: 84 MMHG | WEIGHT: 215 LBS | HEIGHT: 70 IN | OXYGEN SATURATION: 99 % | BODY MASS INDEX: 30.78 KG/M2 | SYSTOLIC BLOOD PRESSURE: 128 MMHG

## 2023-12-11 DIAGNOSIS — Z87.891 HISTORY OF TOBACCO ABUSE: ICD-10-CM

## 2023-12-11 DIAGNOSIS — E78.2 MIXED HYPERLIPIDEMIA: Chronic | ICD-10-CM

## 2023-12-11 DIAGNOSIS — I25.10 CORONARY ARTERY CALCIFICATION SEEN ON CT SCAN: ICD-10-CM

## 2023-12-11 DIAGNOSIS — I48.20 CHRONIC ATRIAL FIBRILLATION (HCC): Primary | Chronic | ICD-10-CM

## 2023-12-11 DIAGNOSIS — I42.9 CARDIOMYOPATHY, UNSPECIFIED TYPE (HCC): ICD-10-CM

## 2023-12-11 DIAGNOSIS — I10 ESSENTIAL HYPERTENSION: Chronic | ICD-10-CM

## 2023-12-11 PROCEDURE — G8427 DOCREV CUR MEDS BY ELIG CLIN: HCPCS | Performed by: INTERNAL MEDICINE

## 2023-12-11 PROCEDURE — G8484 FLU IMMUNIZE NO ADMIN: HCPCS | Performed by: INTERNAL MEDICINE

## 2023-12-11 PROCEDURE — 3074F SYST BP LT 130 MM HG: CPT | Performed by: INTERNAL MEDICINE

## 2023-12-11 PROCEDURE — 1036F TOBACCO NON-USER: CPT | Performed by: INTERNAL MEDICINE

## 2023-12-11 PROCEDURE — G8417 CALC BMI ABV UP PARAM F/U: HCPCS | Performed by: INTERNAL MEDICINE

## 2023-12-11 PROCEDURE — 99214 OFFICE O/P EST MOD 30 MIN: CPT | Performed by: INTERNAL MEDICINE

## 2023-12-11 PROCEDURE — 93000 ELECTROCARDIOGRAM COMPLETE: CPT | Performed by: INTERNAL MEDICINE

## 2023-12-11 PROCEDURE — 3017F COLORECTAL CA SCREEN DOC REV: CPT | Performed by: INTERNAL MEDICINE

## 2023-12-11 PROCEDURE — 1123F ACP DISCUSS/DSCN MKR DOCD: CPT | Performed by: INTERNAL MEDICINE

## 2023-12-11 PROCEDURE — 3079F DIAST BP 80-89 MM HG: CPT | Performed by: INTERNAL MEDICINE

## 2023-12-11 RX ORDER — PRAVASTATIN SODIUM 20 MG
20 TABLET ORAL DAILY
Qty: 90 TABLET | Refills: 3 | Status: SHIPPED | OUTPATIENT
Start: 2023-12-11

## 2023-12-11 RX ORDER — METOPROLOL SUCCINATE 50 MG/1
50 TABLET, EXTENDED RELEASE ORAL DAILY
Qty: 90 TABLET | Refills: 3 | Status: SHIPPED | OUTPATIENT
Start: 2023-12-11

## 2023-12-11 NOTE — PATIENT INSTRUCTIONS
Plan:  1. Medications reviewed. Refills warranted  2. Adding aldactone discussed and he declines at this time  3. Patient prefers to stay on coumadin at this time  4. EKG today    Plan to follow up in a year.

## 2023-12-15 DIAGNOSIS — R42 VERTIGO: Primary | ICD-10-CM

## 2023-12-28 ENCOUNTER — HOSPITAL ENCOUNTER (OUTPATIENT)
Dept: PHYSICAL THERAPY | Age: 73
Setting detail: THERAPIES SERIES
Discharge: HOME | End: 2023-12-28
Payer: MEDICARE

## 2023-12-28 DIAGNOSIS — R26.89 IMBALANCE: ICD-10-CM

## 2023-12-28 DIAGNOSIS — R42 VERTIGO: Primary | ICD-10-CM

## 2023-12-28 DIAGNOSIS — R29.898 WEAKNESS OF BOTH LEGS: ICD-10-CM

## 2023-12-28 PROCEDURE — 97112 NEUROMUSCULAR REEDUCATION: CPT

## 2023-12-28 PROCEDURE — 97161 PT EVAL LOW COMPLEX 20 MIN: CPT

## 2023-12-28 NOTE — PLAN OF CARE
education. Neuromuscular Re-education (46138) activation and proprioception, including postural re-education. Gait Training (20503) for normalization of ambulation patterns and AD training.    Manual Therapy (86739) as indicated to include: TBD as needed  Patient education on activity modification, progression of HEP, and vestibular fuction/BPPV and reducing fall risk     HEP instruction: HEP discussed and performed, see exercise grid       Electronically Signed by Denise Rivera, MARIAN  Date: 12/28/2023

## 2023-12-28 NOTE — FLOWSHEET NOTE
Status: [] Progressing: [] Met: [] Not Met: [] Adjusted     Long Term Goals: To be achieved in: 6 weeks  DHI score of 10/100 or less to assist with return to prior level of function. 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 Status: [] Progressing: [] Met: [] Not Met: [] Adjusted  Patient will perform normal ADLs without symptoms 80% of the time. Status: [] Progressing: [] Met: [] Not Met: [] Adjusted  Pt will report no falls for a time period of 4 weeks. Status: [] Progressing: [] Met: [] Not Met: [] Adjusted  Patient will improve DGI score to 20/24 or better in order to reduce fall risk. Status: [] Progressing: [] Met: [] Not Met: [] Adjusted       Overall Progression Towards Functional goals/ Treatment Progress Update:  [] Patient is progressing as expected towards functional goals listed. [] Progression is slowed due to complexities/Impairments listed. [] Progression has been slowed due to co-morbidities. [x] Plan just implemented, too soon (<30days) to assess goals progression   [] Goals require adjustment due to lack of progress  [] Patient is not progressing as expected and requires additional follow up with physician  [] Other:     CHARGE CAPTURE     PT CHARGE GRID   CPT Code (TIMED) minutes # CPT Code (UNTIMED) #     Therex [phone])     EVAL:LOW (80575 - Typically 20 minutes face-to-face) 1    Neuromusc.  Re-ed

## 2024-01-02 ENCOUNTER — TELEPHONE (OUTPATIENT)
Dept: PULMONOLOGY | Age: 74
End: 2024-01-02

## 2024-01-02 DIAGNOSIS — J96.11 CHRONIC RESPIRATORY FAILURE WITH HYPOXIA (HCC): Primary | ICD-10-CM

## 2024-01-02 DIAGNOSIS — G47.33 OSA (OBSTRUCTIVE SLEEP APNEA): ICD-10-CM

## 2024-01-02 NOTE — TELEPHONE ENCOUNTER
Dr. Wilson gave samples of Spiriva at the last visit and it has helped. Patient is wanting a [facility] to [facility].   Patient also needs order for all CPAP supplies. Will send to Michelle .

## 2024-01-04 NOTE — TELEPHONE ENCOUNTER
CPAP orders placed. Please send to DME.  He will also need to be seen in March.  Please schedule fu with Dr. Wilson.

## 2024-01-06 DIAGNOSIS — I10 ESSENTIAL HYPERTENSION: ICD-10-CM

## 2024-01-08 DIAGNOSIS — J01.90 ACUTE BACTERIAL SINUSITIS: Primary | ICD-10-CM

## 2024-01-08 DIAGNOSIS — B96.89 ACUTE BACTERIAL SINUSITIS: Primary | ICD-10-CM

## 2024-01-08 RX ORDER — AMOXICILLIN 500 MG/1
1000 CAPSULE ORAL 2 TIMES DAILY
Qty: 40 CAPSULE | Refills: 0 | Status: SHIPPED | OUTPATIENT
Start: 2024-01-08 | End: 2024-01-18

## 2024-01-08 RX ORDER — LISINOPRIL 10 MG/1
10 TABLET ORAL DAILY
Qty: 90 TABLET | Refills: 3 | Status: SHIPPED | OUTPATIENT
Start: 2024-01-08

## 2024-01-11 ENCOUNTER — APPOINTMENT (OUTPATIENT)
Dept: PHYSICAL THERAPY | Age: 74
End: 2024-01-11
Payer: COMMERCIAL

## 2024-01-11 ENCOUNTER — HOSPITAL ENCOUNTER (OUTPATIENT)
Dept: PHYSICAL THERAPY | Age: 74
Setting detail: THERAPIES SERIES
Discharge: HOME | End: 2024-01-11
Payer: MEDICARE

## 2024-01-11 PROCEDURE — 97112 NEUROMUSCULAR REEDUCATION: CPT

## 2024-01-11 PROCEDURE — 97110 THERAPEUTIC EXERCISES: CPT

## 2024-01-11 NOTE — FLOWSHEET NOTE
Status: [] Progressing: [] Met: [] Not Met: [] Adjusted  Pt will report no falls for a time period of 4 weeks.                                                                                                             Status: [] Progressing: [] Met: [] Not Met: [] Adjusted  Patient will improve DGI score to 20/24 or better in order to reduce fall risk.                                                                                                       Status: [] Progressing: [] Met: [] Not Met: [] Adjusted       Overall Progression Towards Functional goals/ Treatment Progress Update:  [] Patient is progressing as expected towards functional goals listed.    [] Progression is slowed due to complexities/Impairments listed.  [] Progression has been slowed due to co-morbidities.  [x] Plan just implemented, too soon (<30days) to assess goals progression   [] Goals require adjustment due to lack of progress  [] Patient is not progressing as expected and requires additional follow up with physician  [] Other:     CHARGE CAPTURE     PT CHARGE GRID   CPT Code (TIMED) minutes # CPT Code (UNTIMED) #     Therex (07528)  20 1  EVAL:LOW (44089 - Typically 20 minutes face-to-face)     Neuromusc. Re-ed (58680) 25 2  Re-Eval (75836)     Manual (80967)    Estim Unattended (28666)     Ther. Act (20681)    Mech. Traction (07081)     Gait (26683)    Dry Needle 1-2 muscle (06876)     Aquatic Therex (51546)    Dry Needle 3+ muscle (48437)     Iontophoresis (79830)    VASO (38367)     Ultrasound (75592)    Group Therapy (67275)     Estim Attended (17661)    Canalith Repositioning (67217)     Other:    Other:    Total Timed Code Tx Minutes 45 3       Total Treatment Minutes 50        Charge Justification:  (67884) THERAPEUTIC EXERCISE - Provided verbal/tactile cueing for activities related to strengthening, flexibility, endurance, ROM performed to prevent

## 2024-01-16 ENCOUNTER — HOSPITAL ENCOUNTER (OUTPATIENT)
Dept: PHYSICAL THERAPY | Age: 74
Setting detail: THERAPIES SERIES
Discharge: HOME | End: 2024-01-16
Payer: MEDICARE

## 2024-01-16 PROCEDURE — 97112 NEUROMUSCULAR REEDUCATION: CPT

## 2024-01-16 PROCEDURE — 97140 MANUAL THERAPY 1/> REGIONS: CPT

## 2024-01-16 NOTE — FLOWSHEET NOTE
[facility]- Outpatient Rehabilitation and Therapy  [facility] [place] [address] office: [phone] fax: [phone]      Physical Therapy: TREATMENT/PROGRESS NOTE   Patient: Bijan Bloom (73 y.o. male)   Treatment Date: 2024   :  1950 MRN: [id number]   Visit #: 3   Insurance Allowable Auth Needed   25 visits per calendar year []Yes    [x]No    Insurance: Payor: MEDICARE / Plan: MEDICARE PART A AND B / Product Type: *No Product type* /   Insurance ID: [id number] - (Medicare)  Secondary Insurance (if applicable): BCBS   Treatment Diagnosis:     ICD-10-CM    1. Vertigo  R42       2. Imbalance  R26.89       3. Weakness of both legs  R29.898          Medical Diagnosis:    Dizziness and giddiness [R42]   Referring Physician: Jose Tena MD  PCP: Jose Tena MD                             Plan of care signed (Y/N): Y    Date of Patient follow up with Physician: unknown     Progress Report/POC: NO  POC update due: (10 visits /OR AUTH LIMITS, whichever is less)  2024     Precautions/ Contra-indications:   Latex allergy:   No  Pacemaker:     No  Other:                    CAD, OA, DM, HTN, afib, cervical disc disorder, chronic back pain, neuropathy,  Surgical Date:       N/A     Red Flags:  None       Preferred Language for Healthcare:   [x]English       []other:    SUBJECTIVE EXAMINATION     Patient Report/Comments: Wakes up in the morning and is a little dizzy.  Balance and light headedness are his main two problems.  Falls backwards in the dark every time.  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     Test used Initial score  23   Pain Summary VAS 0/10    Functional questionnaire DHI 38/100    Other:                OBJECTIVE EXAMINATION     Observation: Off balance noted with walking in clinic.  Standing heel raise 3/5 with hx of neuropathy per pt.     Test measurements: see eval    Exercises/Interventions:

## 2024-01-18 ENCOUNTER — HOSPITAL ENCOUNTER (OUTPATIENT)
Dept: PHYSICAL THERAPY | Age: 74
Setting detail: THERAPIES SERIES
Discharge: HOME | End: 2024-01-18
Payer: MEDICARE

## 2024-01-18 PROCEDURE — 97112 NEUROMUSCULAR REEDUCATION: CPT

## 2024-01-18 NOTE — FLOWSHEET NOTE
[facility]- Outpatient Rehabilitation and Therapy  [place] [address] office: [phone] fax: [phone]      Physical Therapy: TREATMENT/PROGRESS NOTE   Patient: Bijan Bloom (73 y.o. male)   Treatment Date: 2024   :  1950 MRN: [id number]   Visit #: 4   Insurance Allowable Auth Needed   25 visits per calendar year []Yes    [x]No    Insurance: Payor: MEDICARE / Plan: MEDICARE PART A AND B / Product Type: *No Product type* /   Insurance ID: [id number] - (Medicare)  Secondary Insurance (if applicable): BCBS   Treatment Diagnosis:     ICD-10-CM    1. Vertigo  R42       2. Imbalance  R26.89       3. Weakness of both legs  R29.898          Medical Diagnosis:    Dizziness and giddiness [R42]   Referring Physician: Jose Tena MD  PCP: Jose Tena MD                             Plan of care signed (Y/N): Y    Date of Patient follow up with Physician: unknown     Progress Report/POC: NO  POC update due: (10 visits /OR AUTH LIMITS, whichever is less)  2024     Precautions/ Contra-indications:   Latex allergy:   No  Pacemaker:     No  Other:                    CAD, OA, DM, HTN, afib, cervical disc disorder, chronic back pain, neuropathy,  Surgical Date:       N/A     Red Flags:  None       Preferred Language for Healthcare:   [x]English       []other:    SUBJECTIVE EXAMINATION     Patient Report/Comments: Wakes up in the morning and is a little dizzy.  Balance and light headedness are his main two problems.  Falls backwards in the dark every time.  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     Test used Initial score  23   Pain Summary VAS 0/10    Functional questionnaire DHI 38/100    Other:                OBJECTIVE EXAMINATION     Observation: States that he is maybe a litte different. He has been very busy.  Did his exercises one time.      Test measurements: see eval    Exercises/Interventions:

## 2024-01-23 ENCOUNTER — HOSPITAL ENCOUNTER (OUTPATIENT)
Dept: PHYSICAL THERAPY | Age: 74
Setting detail: THERAPIES SERIES
Discharge: HOME | End: 2024-01-23
Payer: COMMERCIAL

## 2024-01-23 PROCEDURE — 97140 MANUAL THERAPY 1/> REGIONS: CPT

## 2024-01-23 PROCEDURE — 97112 NEUROMUSCULAR REEDUCATION: CPT

## 2024-01-23 NOTE — FLOWSHEET NOTE
activation/motor control patterns.Patient will continue to benefit from ongoing evaluation and advanced clinical decision from a Physical Therapist to address and improve muscle strength and neuromuscular control to safely return to PLOF without symptoms or restrictions.      Medical Necessity Documentation:  I certify that this patient meets the below criteria necessary for medical necessity for care and/or justification of therapy services:  The patient has functional impairments and/or activity limitations and would benefit from continued outpatient therapy services to address the deficits outlined in the patients goals  The patient has a complexity identified by an ICD-10 code that has a direct and significant impact on the need for therapy.  (Significantly impacts the rate of recovery and is associated with a primary condition.)     Treatment/Activity Tolerance:  [x] Patient tolerated treatment well [] Patient limited by fatique  [] Patient limited by pain  [] Patient limited by other medical complications  [] Other:     Return to Play: NA    Prognosis for POC: [x] Good [] Fair  [] Poor    Patient requires continued skilled intervention: [x] Yes  [] No      GOALS     Patient stated goal: to have no dizziness and improve balance   Status:             [] Progressing: [] Met: [] Not Met: [] Adjusted     Therapist goals for Patient:   Short Term Goals: To be achieved in: 3 weeks  Independent in HEP and progression per patient tolerance, in order to progress toward full function and prevent re-injury.               Status: [] Progressing: [] Met: [] Not Met: [] Adjusted  Patient's subjective complaint of dizziness/imbalance/symptoms to decrease by 25-50% to tolerate functional activities.              Status: [] Progressing: [] Met: [] Not Met: [] Adjusted     Long Term Goals: To be achieved in: 6 weeks  DHI score of 10/100 or less to assist with return to prior level of function.                 Status: []

## 2024-01-25 ENCOUNTER — APPOINTMENT (OUTPATIENT)
Dept: PHYSICAL THERAPY | Age: 74
End: 2024-01-25
Payer: COMMERCIAL

## 2024-01-28 RX ORDER — FENOFIBRATE 54 MG/1
54 TABLET ORAL DAILY
Qty: 90 TABLET | Refills: 3 | OUTPATIENT
Start: 2024-01-28

## 2024-01-30 ENCOUNTER — APPOINTMENT (OUTPATIENT)
Dept: PHYSICAL THERAPY | Age: 74
End: 2024-01-30
Payer: COMMERCIAL

## 2024-01-30 ENCOUNTER — TELEPHONE (OUTPATIENT)
Dept: FAMILY MEDICINE CLINIC | Age: 74
End: 2024-01-30

## 2024-02-01 ENCOUNTER — HOSPITAL ENCOUNTER (OUTPATIENT)
Dept: PHYSICAL THERAPY | Age: 74
Setting detail: THERAPIES SERIES
Discharge: HOME | End: 2024-02-01
Payer: COMMERCIAL

## 2024-02-01 PROCEDURE — 97140 MANUAL THERAPY 1/> REGIONS: CPT

## 2024-02-01 PROCEDURE — 97112 NEUROMUSCULAR REEDUCATION: CPT

## 2024-02-01 NOTE — FLOWSHEET NOTE
dizziness at that time.Cont per POC>    During therapy this date, patient required verbal cueing for exercise progression and improving proper muscle recruitment and activation/motor control patterns.Patient will continue to benefit from ongoing evaluation and advanced clinical decision from a Physical Therapist to address and improve muscle strength and neuromuscular control to safely return to PLOF without symptoms or restrictions.      Medical Necessity Documentation:  I certify that this patient meets the below criteria necessary for medical necessity for care and/or justification of therapy services:  The patient has functional impairments and/or activity limitations and would benefit from continued outpatient therapy services to address the deficits outlined in the patients goals  The patient has a complexity identified by an ICD-10 code that has a direct and significant impact on the need for therapy.  (Significantly impacts the rate of recovery and is associated with a primary condition.)     Treatment/Activity Tolerance:  [x] Patient tolerated treatment well [] Patient limited by fatique  [] Patient limited by pain  [] Patient limited by other medical complications  [] Other:     Return to Play: NA    Prognosis for POC: [x] Good [] Fair  [] Poor    Patient requires continued skilled intervention: [x] Yes  [] No      GOALS     Patient stated goal: to have no dizziness and improve balance   Status:             [] Progressing: [] Met: [] Not Met: [] Adjusted     Therapist goals for Patient:   Short Term Goals: To be achieved in: 3 weeks  Independent in HEP and progression per patient tolerance, in order to progress toward full function and prevent re-injury.               Status: [] Progressing: [] Met: [] Not Met: [] Adjusted  Patient's subjective complaint of dizziness/imbalance/symptoms to decrease by 25-50% to tolerate functional activities.              Status: [] Progressing: [] Met: [] Not Met: []

## 2024-02-06 ENCOUNTER — HOSPITAL ENCOUNTER (OUTPATIENT)
Dept: PHYSICAL THERAPY | Age: 74
Setting detail: THERAPIES SERIES
Discharge: HOME | End: 2024-02-06
Payer: MEDICARE

## 2024-02-06 PROCEDURE — 97110 THERAPEUTIC EXERCISES: CPT

## 2024-02-06 PROCEDURE — 97112 NEUROMUSCULAR REEDUCATION: CPT

## 2024-02-06 NOTE — FLOWSHEET NOTE
Long Term Goals: To be achieved in: 6 weeks  DHI score of 10/100 or less to assist with return to prior level of function.                 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                                             Status: [] Progressing: [] Met: [] Not Met: [] Adjusted  Patient will perform normal ADLs without symptoms 80% of the time.                                                                                                                 Status: [] Progressing: [] Met: [] Not Met: [] Adjusted  Pt will report no falls for a time period of 4 weeks.                                                                                                             Status: [] Progressing: [] Met: [] Not Met: [] Adjusted  Patient will improve DGI score to 20/24 or better in order to reduce fall risk.                                                                                                       Status: [] Progressing: [] Met: [] Not Met: [] Adjusted       Overall Progression Towards Functional goals/ Treatment Progress Update:  [] Patient is progressing as expected towards functional goals listed.    [] Progression is slowed due to complexities/Impairments listed.  [] Progression has been slowed due to co-morbidities.  [x] Plan just implemented, too soon (<30days) to assess goals progression   [] Goals require adjustment due to lack of progress  [] Patient is not progressing as expected and requires additional follow up with physician  [] Other:     CHARGE CAPTURE     PT CHARGE GRID   CPT Code (TIMED) minutes # CPT Code (UNTIMED) #     Therex (08262)  10 1  EVAL:LOW (66299 - Typically 20 minutes face-to-face)     Neuromusc. Re-ed (35762) 30 2  Re-Eval (63218)     Manual (01560)    Estim

## 2024-02-08 ENCOUNTER — HOSPITAL ENCOUNTER (OUTPATIENT)
Dept: PHYSICAL THERAPY | Age: 74
Setting detail: THERAPIES SERIES
Discharge: HOME | End: 2024-02-08
Payer: MEDICARE

## 2024-02-08 PROCEDURE — 97112 NEUROMUSCULAR REEDUCATION: CPT

## 2024-02-08 NOTE — FLOWSHEET NOTE
9-11 reps      Vitals:   Sitting  BP  113/83    Standing /69    Exercises/Interventions:     Therapeutic Ex (45106) 20 min resistance Sets/time Reps Notes/Cues/Progressions   Sci Fit L2 8 min      Heel raises standing  20x  Weakness noted (hx neuropathy) HEP   Toe raises  20x  Weakness noted   Standing in // bars hip abd, ext and flex RTB 2 10 HEP    Calf raises on leg press machine    Unsuccessful too heavy for pt may try T band PF in future   Mini Squats   on Foam  3  2  1 10  10  15                                  Manual Intervention (65524)  TIME     CST 5-8  15                                 NMR re-education (92925) 25 min resistance Sets/time Reps CUES NEEDED   Pt inst in role of PT, prognosis, plan of care, use of CP/HP, activity modification, and benefits of therapy.  HEP has been established, pt given handout(s) of new exercises.          30 Sec STS   30 Sec  9; 10; 11    Step Ups onto BOSU  Static balance on BOSU, EO/EC  Mini Squats on BOSU  Marching on BOSU   1 min ea  2    Vestibular protocol week 3  1 min x 2 each   3x/day. Reviewed .Continue   Static stance on Foam, in corner.        Optokinetic training   -Horizontal lines  -Vertical lines  -Two targets  2 min each     Checkerboard On Foam  1 min ea     Balance on foam in corner eyes open feet close together  Able to maintain self with ankle strategy     Balance on foam in corner eyes open feet close nods and yes/no   Able to maintain self with ankle strategy     Balance on foam in corner feet close together eyes closed  Unable to keep balance at all PT min assist to keep balance     March in place on foam  x20     Error augmentation balance , posterior displacement, EC  x10     Standing in // bars feet together holding yellow weighted ball arms out and in , up and down and chops both sides    10 each                  Therapeutic Activity (14887)  Sets/time                                          Modalities:    No modalities applied this

## 2024-02-13 ENCOUNTER — HOSPITAL ENCOUNTER (OUTPATIENT)
Dept: PHYSICAL THERAPY | Age: 74
Setting detail: THERAPIES SERIES
Discharge: HOME | End: 2024-02-13
Payer: MEDICARE

## 2024-02-13 PROCEDURE — 97112 NEUROMUSCULAR REEDUCATION: CPT

## 2024-02-13 NOTE — FLOWSHEET NOTE
[facility]- Outpatient Rehabilitation and Therapy  [facility] [place] [address] office: [phone] fax: [phone]      Physical Therapy: TREATMENT/PROGRESS NOTE   Patient: Bijan Bloom (73 y.o. male)   Treatment Date: 2024   :  1950 MRN: [id number]   Visit #: 9   Insurance Allowable Auth Needed   25 visits per calendar year []Yes    [x]No    Insurance: Payor: MEDICARE / Plan: MEDICARE PART A AND B / Product Type: *No Product type* /   Insurance ID: [id number] - (Medicare)  Secondary Insurance (if applicable): BCBS   Treatment Diagnosis:     ICD-10-CM    1. Vertigo  R42       2. Imbalance  R26.89       3. Weakness of both legs  R29.898          Medical Diagnosis:    Dizziness and giddiness [R42]   Referring Physician: Jose Tena MD  PCP: Jose Tena MD                             Plan of care signed (Y/N): Y    Date of Patient follow up with Physician: unknown     Progress Report/POC: YES, Date Range for this report: 23 - 24  POC update due: (10 visits /OR AUTH LIMITS, whichever is less)  24    Precautions/ Contra-indications:   Latex allergy:   No  Pacemaker:     No  Other:                    CAD, OA, DM, HTN, afib, cervical disc disorder, chronic back pain, neuropathy,  Surgical Date:       N/A     Red Flags:  None       Preferred Language for Healthcare:   [x]English       []other:    SUBJECTIVE EXAMINATION     Patient Report/Comments: States that he is doing better.  Doesn't feel like he's going to pass out and has not fallen down.         Test used Initial score  23   Pain Summary VAS 0/10 0   Functional questionnaire DHI 38/100 28/100   Other:                OBJECTIVE EXAMINATION     Observation:     Test measurements:   DHI Improved 10 pts  GE SOP (+) fall with Half Tandem EC  HEP:Progressed to Vestibular protocol week 3  30 Sec STS:  9-11 reps      Vitals:   Sitting  BP  113/83    Standing BP

## 2024-02-15 ENCOUNTER — HOSPITAL ENCOUNTER (OUTPATIENT)
Age: 74
Discharge: HOME | End: 2024-02-15
Payer: COMMERCIAL

## 2024-02-15 ENCOUNTER — HOSPITAL ENCOUNTER (OUTPATIENT)
Dept: PHYSICAL THERAPY | Age: 74
Setting detail: THERAPIES SERIES
Discharge: HOME | End: 2024-02-15
Payer: MEDICARE

## 2024-02-15 LAB
ALBUMIN SERPL-MCNC: 4.1 G/DL (ref 3.4–5)
ALBUMIN/GLOB SERPL: 1.4 {RATIO} (ref 1.1–2.2)
ALP SERPL-CCNC: 67 U/L (ref 40–129)
ALT SERPL-CCNC: 10 U/L (ref 10–40)
ANION GAP SERPL CALCULATED.3IONS-SCNC: 10 MMOL/L (ref 3–16)
AST SERPL-CCNC: 16 U/L (ref 15–37)
BILIRUB SERPL-MCNC: 0.5 MG/DL (ref 0–1)
BUN SERPL-MCNC: 23 MG/DL (ref 7–20)
CALCIUM SERPL-MCNC: 9.6 MG/DL (ref 8.3–10.6)
CHLORIDE SERPL-SCNC: 107 MMOL/L (ref 99–110)
CO2 SERPL-SCNC: 22 MMOL/L (ref 21–32)
CREAT SERPL-MCNC: 1 MG/DL (ref 0.8–1.3)
CREAT UR-MCNC: 165.7 MG/DL (ref 39–259)
GFR SERPLBLD CREATININE-BSD FMLA CKD-EPI: >60 ML/MIN/{1.73_M2}
GLUCOSE SERPL-MCNC: 118 MG/DL (ref 70–99)
MICROALBUMIN UR DL<=1MG/L-MCNC: <1.2 MG/DL
MICROALBUMIN/CREAT UR: NORMAL MG/G (ref 0–30)
POTASSIUM SERPL-SCNC: 3.8 MMOL/L (ref 3.5–5.1)
PROT SERPL-MCNC: 7.1 G/DL (ref 6.4–8.2)
SODIUM SERPL-SCNC: 139 MMOL/L (ref 136–145)

## 2024-02-15 PROCEDURE — 84425 ASSAY OF VITAMIN B-1: CPT

## 2024-02-15 PROCEDURE — 80061 LIPID PANEL: CPT

## 2024-02-15 PROCEDURE — 82306 VITAMIN D 25 HYDROXY: CPT

## 2024-02-15 PROCEDURE — 97112 NEUROMUSCULAR REEDUCATION: CPT

## 2024-02-15 PROCEDURE — 84443 ASSAY THYROID STIM HORMONE: CPT

## 2024-02-15 PROCEDURE — 80053 COMPREHEN METABOLIC PANEL: CPT

## 2024-02-15 PROCEDURE — 82570 ASSAY OF URINE CREATININE: CPT

## 2024-02-15 PROCEDURE — 84439 ASSAY OF FREE THYROXINE: CPT

## 2024-02-15 PROCEDURE — 83036 HEMOGLOBIN GLYCOSYLATED A1C: CPT

## 2024-02-15 PROCEDURE — 84481 FREE ASSAY (FT-3): CPT

## 2024-02-15 PROCEDURE — 82043 UR ALBUMIN QUANTITATIVE: CPT

## 2024-02-15 NOTE — FLOWSHEET NOTE
modulating pain, promoting relaxation,  increasing ROM, reducing/eliminating soft tissue swelling/inflammation/restriction, improving soft tissue extensibility and allowing for proper ROM for normal function with self care, mobility, lifting and ambulation    TREATMENT PLAN   Plan: Cont POC- Continue emphasis/focus on exercise progression, improving proper muscle recruitment and activation/motor control patterns, and static and dynamic balance. Next visit plan to progress reps, add new exercises, and progress balance     Electronically Signed by Della Bhatt PT              Date: 02/15/2024     Note: If patient does not return for scheduled/recommended follow up visits, this note will serve as a discharge from care along with the most recent update on progress.

## 2024-02-16 LAB
25(OH)D3 SERPL-MCNC: 24.2 NG/ML
CHOLEST SERPL-MCNC: 123 MG/DL (ref 0–199)
EST. AVERAGE GLUCOSE BLD GHB EST-MCNC: 134.1 MG/DL
HBA1C MFR BLD: 6.3 %
HDLC SERPL-MCNC: 33 MG/DL (ref 40–60)
LDLC SERPL CALC-MCNC: 58 MG/DL
T3FREE SERPL-MCNC: 2.2 PG/ML (ref 2.3–4.2)
T4 FREE SERPL-MCNC: 1.2 NG/DL (ref 0.9–1.8)
TRIGL SERPL-MCNC: 159 MG/DL (ref 0–150)
TSH SERPL DL<=0.005 MIU/L-ACNC: 1.85 UIU/ML (ref 0.27–4.2)
VLDLC SERPL CALC-MCNC: 32 MG/DL

## 2024-02-20 ENCOUNTER — HOSPITAL ENCOUNTER (OUTPATIENT)
Dept: PHYSICAL THERAPY | Age: 74
Setting detail: THERAPIES SERIES
Discharge: HOME | End: 2024-02-20
Payer: MEDICARE

## 2024-02-20 LAB — VIT B1 BLD-MCNC: 221 NMOL/L (ref 70–180)

## 2024-02-20 PROCEDURE — 97112 NEUROMUSCULAR REEDUCATION: CPT

## 2024-02-20 NOTE — FLOWSHEET NOTE
[facility]- Outpatient Rehabilitation and Therapy  [facility] [place] [address] office: [phone] fax: [phone]      Physical Therapy: TREATMENT/PROGRESS NOTE   Patient: Bijan Bloom (73 y.o. male)   Treatment Date: 2024   :  1950 MRN: [id number]   Visit #:   Insurance Allowable Auth Needed   25 visits per calendar year []Yes    [x]No    Insurance: Payor: BCBS / Plan: BCBS - [place] PPO / Product Type: *No Product type* /   Insurance ID: [id number] - ([facility])  Secondary Insurance (if applicable): MEDICARE   Treatment Diagnosis:     ICD-10-CM    1. Vertigo  R42       2. Imbalance  R26.89       3. Weakness of both legs  R29.898          Medical Diagnosis:    Dizziness and giddiness [R42]   Referring Physician: Jose Tena MD  PCP: Jose Tena MD                             Plan of care signed (Y/N): Y    Date of Patient follow up with Physician: unknown     Progress Report/POC: YES, Date Range for this report: 23 - 24  POC update due: (10 visits /OR AUTH LIMITS, whichever is less)  24    Precautions/ Contra-indications:   Latex allergy:   No  Pacemaker:     No  Other:                    CAD, OA, DM, HTN, afib, cervical disc disorder, chronic back pain, neuropathy,  Surgical Date:       N/A     Red Flags:  None       Preferred Language for Healthcare:   [x]English       []other:    SUBJECTIVE EXAMINATION     Patient Report/Comments: States that he is doing ok today.  Has been doing his exercises, having particular trouble with the walking with head turns exercise.  Felt like a pin-ball.  Will discuss plan for DC vs. Continue PT next visit.       Test used Initial score  23   Pain Summary VAS 0/10 0   Functional questionnaire DHI    Other:                OBJECTIVE EXAMINATION     Observation:     Test measurements:   DHI Improved 10 pts  GE SOP (+) fall with Half Tandem EC  HEP:Progressed to Vestibular

## 2024-02-22 ENCOUNTER — HOSPITAL ENCOUNTER (OUTPATIENT)
Dept: PHYSICAL THERAPY | Age: 74
Setting detail: THERAPIES SERIES
Discharge: HOME | End: 2024-02-22
Payer: MEDICARE

## 2024-02-22 PROCEDURE — 97112 NEUROMUSCULAR REEDUCATION: CPT

## 2024-02-22 NOTE — FLOWSHEET NOTE
eyes open feet close nods and yes/no   Able to maintain self with ankle strategy     Balance on foam in corner feet close together eyes closed  Unable to keep balance at all PT min assist to keep balance     March in place on foam  x20     Error augmentation balance , posterior displacement, EC  x10     Standing in // bars feet together holding yellow weighted ball arms out and in , up and down and chops both sides    10 each                  Therapeutic Activity (23205)  Sets/time                                          Modalities:    No modalities applied this session    Education/Home Exercise Program: HEP discussed and performed, see exercise grid handouts given for new exs.       ASSESSMENT     Today's Assessment: Pt has been seen for 12 visits of PT.  Initial eval date 12/28/23.  Reassessed for Discharge todayPt has met 2/2 short term goals and 3/5 long term goals of therapy.   Did not meet goal for DHi due to continued dizziness in the dark.  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  Feels ready to discharge at this time.   TRANSITION TO HEP/DC FORMAL PT: Patient is currently independent with symptom management and home exercise program. Patient reports understanding of the importance of continued compliance with home exercise program after discharge.  Patient has reached 3/5 long term goals and should reach all additional goals with compliance to home exercise program upon discharge.        Medical Necessity Documentation:  I certify that this patient meets the below criteria necessary for medical necessity for care and/or justification of therapy services:  The patient has functional impairments and/or activity limitations and would benefit from continued outpatient therapy services to address the deficits outlined in the patients goals  The patient has a complexity identified by an ICD-10 code that has a direct and significant impact on the need for therapy.  (Significantly impacts the rate of

## 2024-02-27 ENCOUNTER — APPOINTMENT (OUTPATIENT)
Dept: PHYSICAL THERAPY | Age: 74
End: 2024-02-27
Payer: MEDICARE

## 2024-02-29 ENCOUNTER — APPOINTMENT (OUTPATIENT)
Dept: PHYSICAL THERAPY | Age: 74
End: 2024-02-29
Payer: MEDICARE

## 2024-04-18 DIAGNOSIS — E11.40 DIABETIC NEUROPATHY, PAINFUL (HCC): ICD-10-CM

## 2024-04-19 DIAGNOSIS — E11.40 DIABETIC NEUROPATHY, PAINFUL (HCC): ICD-10-CM

## 2024-04-19 RX ORDER — GABAPENTIN 300 MG/1
CAPSULE ORAL
Qty: 90 CAPSULE | Refills: 3 | Status: SHIPPED | OUTPATIENT
Start: 2024-04-19 | End: 2024-10-16

## 2024-04-19 RX ORDER — DULOXETIN HYDROCHLORIDE 60 MG/1
60 CAPSULE, DELAYED RELEASE ORAL DAILY
Qty: 90 CAPSULE | Refills: 3 | Status: SHIPPED | OUTPATIENT
Start: 2024-04-19

## 2024-05-15 ENCOUNTER — ANTI-COAG VISIT (OUTPATIENT)
Dept: PHARMACY | Age: 74
End: 2024-05-15
Payer: MEDICARE

## 2024-05-15 DIAGNOSIS — I48.20 CHRONIC ATRIAL FIBRILLATION (HCC): Primary | Chronic | ICD-10-CM

## 2024-05-15 LAB — INTERNATIONAL NORMALIZATION RATIO, POC: 1.4

## 2024-05-15 PROCEDURE — 85610 PROTHROMBIN TIME: CPT | Performed by: PHARMACIST

## 2024-05-15 PROCEDURE — 99211 OFF/OP EST MAY X REQ PHY/QHP: CPT | Performed by: PHARMACIST

## 2024-05-15 NOTE — PROGRESS NOTES
is here for management of anticoagulation for AFib.   PMH also significant for DM, Hyopthyroid, GERD, HTN, CAD, COPD.  He presents today w/out complaint.  Pt verifies dosing regimen as listed above.  Pt denies s/s bleeding/bruising/swelling/SOB.  No BRBPR. No melena.  Reviewed pt medication list.  No changes in OTC/herbal medications.  Reviewed dietary concerns. Pt states that he does not eat a lot of greens.  No missed doses.    Last seen ~6 months ago.    No changes per pt.  Does not think he has missed any doses.  He was on vacation last week, diet may have been different?  INR had otherwise been stable on this dose.    INR 1.4 is below the acceptable therapeutic range of 2-3.  Recommend to increase dose to 7.5 mg every day except 10 mg Q Wed  Patient has 5 mg and 2 mg tablets.  Will continue to monitor and check INR in 2 weeks.  Dosing reminder card given with phone number, appointment date, and time.  Return to clinic: 24 @ 1115 AM    Rajiv Anne PharmD 11:09 AM EDT 5/15/24    For Pharmacy Admin Tracking Only    Intervention Detail: Adherence Monitorin and Dose Adjustment: 1, reason: Therapy Optimization  Total # of Interventions Recommended: 2  Total # of Interventions Accepted: 2  Time Spent (min): 15

## 2024-06-05 ENCOUNTER — TELEPHONE (OUTPATIENT)
Dept: FAMILY MEDICINE CLINIC | Age: 74
End: 2024-06-05

## 2024-06-05 NOTE — TELEPHONE ENCOUNTER
----- Message from Gabriella Rivers sent at 6/5/2024  9:52 AM EDT -----  Regarding: ECC Appointment Request  ECC Appointment Request    Patient needs appointment for ECC Appointment Type: Annual Visit. AWV    Reason for Appointment Request: No appointments available during search  Preferred date/time: anytime as long asb it is under his current provider  --------------------------------------------------------------------------------------------------------------------------    Relationship to Patient: Spouse/Partner      Call Back Information: OK to leave message on voicemail  Preferred Call Back Number: Phone [phone]

## 2024-06-05 NOTE — TELEPHONE ENCOUNTER
Left message for patient to call, he is needing to scheduled his AWV, this will need to be scheduled with Darby Ramirez CNP

## 2024-06-08 DIAGNOSIS — J96.11 CHRONIC RESPIRATORY FAILURE WITH HYPOXIA (HCC): ICD-10-CM

## 2024-06-10 RX ORDER — TIOTROPIUM BROMIDE INHALATION SPRAY 3.12 UG/1
SPRAY, METERED RESPIRATORY (INHALATION)
Qty: 12 G | Refills: 3 | OUTPATIENT
Start: 2024-06-10

## 2024-06-11 ENCOUNTER — OFFICE VISIT (OUTPATIENT)
Dept: FAMILY MEDICINE CLINIC | Age: 74
End: 2024-06-11
Payer: COMMERCIAL

## 2024-06-11 VITALS
SYSTOLIC BLOOD PRESSURE: 122 MMHG | OXYGEN SATURATION: 96 % | DIASTOLIC BLOOD PRESSURE: 78 MMHG | HEART RATE: 96 BPM | WEIGHT: 224 LBS | BODY MASS INDEX: 32.14 KG/M2

## 2024-06-11 DIAGNOSIS — I42.9 CARDIOMYOPATHY, UNSPECIFIED TYPE (HCC): ICD-10-CM

## 2024-06-11 DIAGNOSIS — E11.42 TYPE 2 DIABETES MELLITUS WITH DIABETIC POLYNEUROPATHY, WITH LONG-TERM CURRENT USE OF INSULIN (HCC): Primary | ICD-10-CM

## 2024-06-11 DIAGNOSIS — I48.20 CHRONIC ATRIAL FIBRILLATION (HCC): ICD-10-CM

## 2024-06-11 DIAGNOSIS — I10 ESSENTIAL HYPERTENSION: ICD-10-CM

## 2024-06-11 DIAGNOSIS — L73.9 FOLLICULITIS: ICD-10-CM

## 2024-06-11 DIAGNOSIS — E78.2 MIXED HYPERLIPIDEMIA: ICD-10-CM

## 2024-06-11 DIAGNOSIS — Z79.4 TYPE 2 DIABETES MELLITUS WITH DIABETIC POLYNEUROPATHY, WITH LONG-TERM CURRENT USE OF INSULIN (HCC): Primary | ICD-10-CM

## 2024-06-11 PROCEDURE — G8427 DOCREV CUR MEDS BY ELIG CLIN: HCPCS | Performed by: FAMILY MEDICINE

## 2024-06-11 PROCEDURE — G8417 CALC BMI ABV UP PARAM F/U: HCPCS | Performed by: FAMILY MEDICINE

## 2024-06-11 PROCEDURE — 1036F TOBACCO NON-USER: CPT | Performed by: FAMILY MEDICINE

## 2024-06-11 PROCEDURE — 2022F DILAT RTA XM EVC RTNOPTHY: CPT | Performed by: FAMILY MEDICINE

## 2024-06-11 PROCEDURE — 3078F DIAST BP <80 MM HG: CPT | Performed by: FAMILY MEDICINE

## 2024-06-11 PROCEDURE — 3044F HG A1C LEVEL LT 7.0%: CPT | Performed by: FAMILY MEDICINE

## 2024-06-11 PROCEDURE — 1123F ACP DISCUSS/DSCN MKR DOCD: CPT | Performed by: FAMILY MEDICINE

## 2024-06-11 PROCEDURE — 99214 OFFICE O/P EST MOD 30 MIN: CPT | Performed by: FAMILY MEDICINE

## 2024-06-11 PROCEDURE — 3017F COLORECTAL CA SCREEN DOC REV: CPT | Performed by: FAMILY MEDICINE

## 2024-06-11 PROCEDURE — 3074F SYST BP LT 130 MM HG: CPT | Performed by: FAMILY MEDICINE

## 2024-06-11 RX ORDER — EMPAGLIFLOZIN 25 MG/1
25 TABLET, FILM COATED ORAL DAILY
COMMUNITY
Start: 2024-05-16

## 2024-06-11 RX ORDER — CEPHALEXIN 500 MG/1
500 CAPSULE ORAL 3 TIMES DAILY
Qty: 30 CAPSULE | Refills: 0 | Status: SHIPPED | OUTPATIENT
Start: 2024-06-11 | End: 2024-06-21

## 2024-06-11 RX ORDER — DULAGLUTIDE 1.5 MG/.5ML
1.5 INJECTION, SOLUTION SUBCUTANEOUS WEEKLY
COMMUNITY
Start: 2024-04-30

## 2024-06-11 SDOH — ECONOMIC STABILITY: INCOME INSECURITY: HOW HARD IS IT FOR YOU TO PAY FOR THE VERY BASICS LIKE FOOD, HOUSING, MEDICAL CARE, AND HEATING?: NOT HARD AT ALL

## 2024-06-11 SDOH — ECONOMIC STABILITY: FOOD INSECURITY: WITHIN THE PAST 12 MONTHS, THE FOOD YOU BOUGHT JUST DIDN'T LAST AND YOU DIDN'T HAVE MONEY TO GET MORE.: NEVER TRUE

## 2024-06-11 SDOH — ECONOMIC STABILITY: FOOD INSECURITY: WITHIN THE PAST 12 MONTHS, YOU WORRIED THAT YOUR FOOD WOULD RUN OUT BEFORE YOU GOT MONEY TO BUY MORE.: NEVER TRUE

## 2024-06-11 SDOH — ECONOMIC STABILITY: HOUSING INSECURITY
IN THE LAST 12 MONTHS, WAS THERE A TIME WHEN YOU DID NOT HAVE A STEADY PLACE TO SLEEP OR SLEPT IN A SHELTER (INCLUDING NOW)?: NO

## 2024-06-11 ASSESSMENT — PATIENT HEALTH QUESTIONNAIRE - PHQ9
SUM OF ALL RESPONSES TO PHQ QUESTIONS 1-9: 0
SUM OF ALL RESPONSES TO PHQ9 QUESTIONS 1 & 2: 0
SUM OF ALL RESPONSES TO PHQ QUESTIONS 1-9: 0
1. LITTLE INTEREST OR PLEASURE IN DOING THINGS: NOT AT ALL
SUM OF ALL RESPONSES TO PHQ QUESTIONS 1-9: 0
SUM OF ALL RESPONSES TO PHQ QUESTIONS 1-9: 0

## 2024-06-11 NOTE — PATIENT INSTRUCTIONS
Please read the healthy family handout that you were given and share it with your family.       Please compare this printed medication list with your medications at home to be sure they are the same.  If you have any medications that are different please contact us immediately at [phone].     Also review your allergies that we have listed, these may also include medications that you have not been able to tolerate, make sure everything listed is correct. If you have any allergies that are different please contact us immediately at [phone].     You may receive a survey in the mail or by email asking about your experience during your visit today. Please complete and return to us so we know how we are serving you.

## 2024-06-11 NOTE — PROGRESS NOTES
He presents today for routine follow-up of his diabetes heart disease atrial fibrillation on Coumadin, hypertension and high cholesterol.  The Mounjaro was causing severe constipation so his endocrinologist switched him back to [facility].  He goes to Dr. Sanchez for endocrinology on a regular basis.  He still has some constipation but the stomach pain that he was having is better.  His last A1c was 6.3%.  He usually gets all his blood work done through endocrinology and I will review them.  He has a rash on both arms that starts out as bumps and he has sores.  Tests and documents reviewed today: Last labs      Objective:   /78   Pulse 96   Wt 101.6 kg (224 lb)   SpO2 96%   BMI 32.14 kg/m²   BP Readings from Last 3 Encounters:   06/11/24 122/78   12/11/23 128/84   12/05/23 95/60     Physical Exam  Vitals reviewed.   Constitutional:       Appearance: He is well-developed. He is not toxic-appearing.   HENT:      Head: Normocephalic.   Neck:      Thyroid: No thyroid mass or thyromegaly.   Cardiovascular:      Rate and Rhythm: Normal rate. Rhythm irregular.      Heart sounds: Normal heart sounds. No murmur heard.  Pulmonary:      Effort: No respiratory distress.      Breath sounds: Normal breath sounds. No wheezing or rales.   Abdominal:      General: There is no distension.      Palpations: Abdomen is soft. There is no mass.      Tenderness: There is no abdominal tenderness. There is no guarding or rebound.   Musculoskeletal:      Cervical back: Neck supple.   Lymphadenopathy:      Cervical: No cervical adenopathy.      Upper Body:      Right upper body: No supraclavicular adenopathy.   Skin:     General: Skin is warm.   Neurological:      Mental Status: He is alert and oriented to person, place, and time.   Psychiatric:         Behavior: Behavior normal.         Thought Content: Thought content normal.         Judgment: Judgment normal.     Follicular rash on both arms  Assessment and Plan:   Diagnosis Orders

## 2024-07-11 RX ORDER — PANTOPRAZOLE SODIUM 40 MG/1
40 TABLET, DELAYED RELEASE ORAL DAILY
Qty: 90 TABLET | Refills: 3 | Status: SHIPPED | OUTPATIENT
Start: 2024-07-11

## 2024-08-05 ENCOUNTER — TELEPHONE (OUTPATIENT)
Dept: PULMONOLOGY | Age: 74
End: 2024-08-05

## 2024-08-05 DIAGNOSIS — G47.33 OBSTRUCTIVE SLEEP APNEA: Primary | ICD-10-CM

## 2024-08-05 NOTE — TELEPHONE ENCOUNTER
Patient did not show for sleep office visit  with   on 8/5/24.    Reason:  unknown      This is patient's first no show.  Patient was ano show on: n/a.      Patient did not reschedule.  Reschedule date:  n/a

## 2024-08-06 RX ORDER — FENOFIBRATE 54 MG/1
54 TABLET ORAL DAILY
Qty: 90 TABLET | Refills: 3 | Status: SHIPPED | OUTPATIENT
Start: 2024-08-06

## 2024-08-12 ENCOUNTER — CLINICAL DOCUMENTATION (OUTPATIENT)
Dept: SPIRITUAL SERVICES | Age: 74
End: 2024-08-12

## 2024-08-12 NOTE — ACP (ADVANCE CARE PLANNING)
planning  [x] Referred individual to Provider for additional questions/concerns   [] Advised patient/agent/surrogate to review completed ACP document and update if needed with changes in condition, patient preferences or care setting    [x] This note routed to one or more involved healthcare providers     ACP Specialist scheduled call with patient and spouse to discuss AD documents.  Patient was present for his spouse ACP conversation and also completed AD documents with spouse.  Staff reviewed care preferences and patient said he was experienced with the use of a ventilator from his experience with his father.  ACP Specialist will prepare AD documents via [employer] and send documents to patient for signature.    8/13/24 Addendum:  ACP Specialists assisted patient with completing documents by [employer] and documents were imported into patient medical record.  ACP Specialist created a new internal ACP referral for patient and will also copy of mail documents to patient.

## 2024-08-13 ENCOUNTER — CLINICAL DOCUMENTATION (OUTPATIENT)
Dept: SPIRITUAL SERVICES | Age: 74
End: 2024-08-13

## 2024-08-16 ENCOUNTER — TELEPHONE (OUTPATIENT)
Dept: FAMILY MEDICINE CLINIC | Age: 74
End: 2024-08-16

## 2024-08-16 DIAGNOSIS — R25.1 TREMOR OF LEFT HAND: Primary | ICD-10-CM

## 2024-08-22 ENCOUNTER — HOSPITAL ENCOUNTER (OUTPATIENT)
Age: 74
Setting detail: SPECIMEN
Discharge: HOME | End: 2024-08-22
Payer: MEDICARE

## 2024-08-22 ENCOUNTER — HOSPITAL ENCOUNTER (OUTPATIENT)
Age: 74
Discharge: HOME | End: 2024-08-22

## 2024-08-22 LAB
ALBUMIN SERPL-MCNC: 3.9 G/DL (ref 3.4–5)
ALBUMIN/GLOB SERPL: 1.5 {RATIO} (ref 1.1–2.2)
ALP SERPL-CCNC: 61 U/L (ref 40–129)
ALT SERPL-CCNC: 33 U/L (ref 10–40)
ANION GAP SERPL CALCULATED.3IONS-SCNC: 10 MMOL/L (ref 3–16)
AST SERPL-CCNC: 30 U/L (ref 15–37)
BILIRUB SERPL-MCNC: 0.6 MG/DL (ref 0–1)
BUN SERPL-MCNC: 17 MG/DL (ref 7–20)
CALCIUM SERPL-MCNC: 8.9 MG/DL (ref 8.3–10.6)
CHLORIDE SERPL-SCNC: 105 MMOL/L (ref 99–110)
CHOLEST SERPL-MCNC: 124 MG/DL (ref 0–199)
CO2 SERPL-SCNC: 26 MMOL/L (ref 21–32)
CREAT SERPL-MCNC: 1 MG/DL (ref 0.8–1.3)
CREAT UR-MCNC: 86.8 MG/DL (ref 39–259)
GFR SERPLBLD CREATININE-BSD FMLA CKD-EPI: 80 ML/MIN/{1.73_M2}
GLUCOSE SERPL-MCNC: 140 MG/DL (ref 70–99)
HDLC SERPL-MCNC: 23 MG/DL (ref 40–60)
LDLC SERPL CALC-MCNC: 56 MG/DL
MICROALBUMIN UR DL<=1MG/L-MCNC: <1.2 MG/DL
MICROALBUMIN/CREAT UR: NORMAL MG/G (ref 0–30)
POTASSIUM SERPL-SCNC: 4.5 MMOL/L (ref 3.5–5.1)
PROT SERPL-MCNC: 6.5 G/DL (ref 6.4–8.2)
SODIUM SERPL-SCNC: 141 MMOL/L (ref 136–145)
T4 FREE SERPL-MCNC: 1.1 NG/DL (ref 0.9–1.8)
TRIGL SERPL-MCNC: 227 MG/DL (ref 0–150)
TSH SERPL DL<=0.005 MIU/L-ACNC: 1.89 UIU/ML (ref 0.27–4.2)
VLDLC SERPL CALC-MCNC: 45 MG/DL

## 2024-08-22 PROCEDURE — 83036 HEMOGLOBIN GLYCOSYLATED A1C: CPT

## 2024-08-22 PROCEDURE — 84443 ASSAY THYROID STIM HORMONE: CPT

## 2024-08-22 PROCEDURE — 82570 ASSAY OF URINE CREATININE: CPT

## 2024-08-22 PROCEDURE — 84439 ASSAY OF FREE THYROXINE: CPT

## 2024-08-22 PROCEDURE — 82043 UR ALBUMIN QUANTITATIVE: CPT

## 2024-08-22 PROCEDURE — 80061 LIPID PANEL: CPT

## 2024-08-22 PROCEDURE — 80053 COMPREHEN METABOLIC PANEL: CPT

## 2024-08-23 LAB
EST. AVERAGE GLUCOSE BLD GHB EST-MCNC: 139.9 MG/DL
HBA1C MFR BLD: 6.5 %

## 2024-09-20 ENCOUNTER — OFFICE VISIT (OUTPATIENT)
Age: 74
End: 2024-09-20
Payer: COMMERCIAL

## 2024-09-20 VITALS
SYSTOLIC BLOOD PRESSURE: 107 MMHG | WEIGHT: 224.6 LBS | OXYGEN SATURATION: 97 % | BODY MASS INDEX: 32.16 KG/M2 | DIASTOLIC BLOOD PRESSURE: 67 MMHG | HEIGHT: 70 IN | HEART RATE: 75 BPM

## 2024-09-20 DIAGNOSIS — R25.9 MIXED ACTION AND RESTING TREMOR: ICD-10-CM

## 2024-09-20 DIAGNOSIS — E11.40 DIABETIC NEUROPATHY, PAINFUL (HCC): Chronic | ICD-10-CM

## 2024-09-20 DIAGNOSIS — R27.8 SENSORY ATAXIA: ICD-10-CM

## 2024-09-20 DIAGNOSIS — R41.89 SUBJECTIVE COGNITIVE IMPAIRMENT: Primary | ICD-10-CM

## 2024-09-20 DIAGNOSIS — G47.33 OSA (OBSTRUCTIVE SLEEP APNEA): ICD-10-CM

## 2024-09-20 PROCEDURE — 3074F SYST BP LT 130 MM HG: CPT | Performed by: STUDENT IN AN ORGANIZED HEALTH CARE EDUCATION/TRAINING PROGRAM

## 2024-09-20 PROCEDURE — 3078F DIAST BP <80 MM HG: CPT | Performed by: STUDENT IN AN ORGANIZED HEALTH CARE EDUCATION/TRAINING PROGRAM

## 2024-09-20 PROCEDURE — 99204 OFFICE O/P NEW MOD 45 MIN: CPT | Performed by: STUDENT IN AN ORGANIZED HEALTH CARE EDUCATION/TRAINING PROGRAM

## 2024-09-20 PROCEDURE — 1123F ACP DISCUSS/DSCN MKR DOCD: CPT | Performed by: STUDENT IN AN ORGANIZED HEALTH CARE EDUCATION/TRAINING PROGRAM

## 2024-09-20 PROCEDURE — 3044F HG A1C LEVEL LT 7.0%: CPT | Performed by: STUDENT IN AN ORGANIZED HEALTH CARE EDUCATION/TRAINING PROGRAM

## 2024-09-20 RX ORDER — GABAPENTIN 300 MG/1
300 CAPSULE ORAL 3 TIMES DAILY
Qty: 90 CAPSULE | Refills: 11 | Status: SHIPPED | OUTPATIENT
Start: 2024-09-20 | End: 2025-09-15

## 2024-10-01 ENCOUNTER — TELEPHONE (OUTPATIENT)
Dept: FAMILY MEDICINE CLINIC | Age: 74
End: 2024-10-01

## 2024-10-02 ENCOUNTER — HOSPITAL ENCOUNTER (OUTPATIENT)
Dept: NUCLEAR MEDICINE | Age: 74
Discharge: HOME | End: 2024-10-02
Payer: MEDICARE

## 2024-10-02 ENCOUNTER — HOSPITAL ENCOUNTER (OUTPATIENT)
Dept: MRI IMAGING | Age: 74
Discharge: HOME | End: 2024-10-02
Attending: STUDENT IN AN ORGANIZED HEALTH CARE EDUCATION/TRAINING PROGRAM
Payer: MEDICARE

## 2024-10-02 ENCOUNTER — HOSPITAL ENCOUNTER (OUTPATIENT)
Age: 74
Discharge: HOME | End: 2024-10-02
Attending: STUDENT IN AN ORGANIZED HEALTH CARE EDUCATION/TRAINING PROGRAM
Payer: MEDICARE

## 2024-10-02 DIAGNOSIS — R41.89 SUBJECTIVE COGNITIVE IMPAIRMENT: ICD-10-CM

## 2024-10-02 DIAGNOSIS — E11.40 DIABETIC NEUROPATHY, PAINFUL (HCC): Chronic | ICD-10-CM

## 2024-10-02 DIAGNOSIS — R25.9 MIXED ACTION AND RESTING TREMOR: ICD-10-CM

## 2024-10-02 LAB — VIT B12 SERPL-MCNC: 583 PG/ML (ref 211–911)

## 2024-10-02 PROCEDURE — 78803 RP LOCLZJ TUM SPECT 1 AREA: CPT

## 2024-10-02 PROCEDURE — A9584 IODINE I-123 IOFLUPANE: HCPCS | Performed by: STUDENT IN AN ORGANIZED HEALTH CARE EDUCATION/TRAINING PROGRAM

## 2024-10-02 PROCEDURE — 36415 COLL VENOUS BLD VENIPUNCTURE: CPT

## 2024-10-02 PROCEDURE — 84165 PROTEIN E-PHORESIS SERUM: CPT

## 2024-10-02 PROCEDURE — 6370000000 HC RX 637 (ALT 250 FOR IP): Performed by: STUDENT IN AN ORGANIZED HEALTH CARE EDUCATION/TRAINING PROGRAM

## 2024-10-02 PROCEDURE — 86334 IMMUNOFIX E-PHORESIS SERUM: CPT

## 2024-10-02 PROCEDURE — 82607 VITAMIN B-12: CPT

## 2024-10-02 PROCEDURE — 83921 ORGANIC ACID SINGLE QUANT: CPT

## 2024-10-02 PROCEDURE — 84155 ASSAY OF PROTEIN SERUM: CPT

## 2024-10-02 PROCEDURE — 3430000000 HC RX DIAGNOSTIC RADIOPHARMACEUTICAL: Performed by: STUDENT IN AN ORGANIZED HEALTH CARE EDUCATION/TRAINING PROGRAM

## 2024-10-02 PROCEDURE — 70551 MRI BRAIN STEM W/O DYE: CPT

## 2024-10-02 RX ORDER — IODINE SOLUTION STRONG 5% (LUGOL'S) 5 %
0.4 SOLUTION ORAL ONCE
Status: COMPLETED | OUTPATIENT
Start: 2024-10-02 | End: 2024-10-02

## 2024-10-02 RX ADMIN — IOFLUPANE I-123 4.9 MILLICURIE: 2 INJECTION, SOLUTION INTRAVENOUS at 10:07

## 2024-10-02 RX ADMIN — IODINE SOLUTION STRONG 5% (LUGOL'S) 0.4 ML: 5 SOLUTION at 09:22

## 2024-10-03 ENCOUNTER — TELEPHONE (OUTPATIENT)
Dept: NEUROLOGY | Age: 74
End: 2024-10-03

## 2024-10-03 NOTE — TELEPHONE ENCOUNTER
Pts spouse called requesting results from imaging and labs. Writer informed pt not all results were final and that this office would contact her when results were final.     Please advise

## 2024-10-04 LAB
ALBUMIN SERPL ELPH-MCNC: 3.1 G/DL (ref 3.1–4.9)
ALPHA1 GLOB SERPL ELPH-MCNC: 0.2 G/DL (ref 0.2–0.4)
ALPHA2 GLOB SERPL ELPH-MCNC: 0.7 G/DL (ref 0.4–1.1)
B-GLOBULIN SERPL ELPH-MCNC: 1.2 G/DL (ref 0.9–1.6)
GAMMA GLOB SERPL ELPH-MCNC: 1.2 G/DL (ref 0.6–1.8)
PROT SERPL-MCNC: 6.5 G/DL (ref 6.4–8.2)
SPE/IFE INTERPRETATION: NORMAL

## 2024-10-04 NOTE — TELEPHONE ENCOUNTER
Pt's wife called about labs and Imaging.  Mri's are complete some labs are still in process.     Please review.

## 2024-10-06 DIAGNOSIS — I48.20 CHRONIC ATRIAL FIBRILLATION (HCC): Chronic | ICD-10-CM

## 2024-10-06 LAB — METHYLMALONATE SERPL-SCNC: 0.24 UMOL/L (ref 0–0.4)

## 2024-10-07 RX ORDER — WARFARIN SODIUM 1 MG/1
1 TABLET ORAL DAILY
Qty: 90 TABLET | Refills: 3 | Status: SHIPPED | OUTPATIENT
Start: 2024-10-07

## 2024-10-07 RX ORDER — WARFARIN SODIUM 2 MG/1
2 TABLET ORAL DAILY
Qty: 90 TABLET | Refills: 3 | Status: SHIPPED | OUTPATIENT
Start: 2024-10-07

## 2024-10-07 NOTE — TELEPHONE ENCOUNTER
Future appt scheduled 0 appt scheduled     Return in about 6 months (around 12/11/2024) for 40 min.                                       Last appt 06/11/2024      Last Written     warfarin (COUMADIN) 1 MG tablet   12/05/2023  #90  3 RF     warfarin (COUMADIN) 2 MG tablet   12/05/2023  #90  3 RF

## 2024-10-07 NOTE — TELEPHONE ENCOUNTER
Left voicemail that Dr Salazar sent the patient a my chart message regarding labs and imaging.      Told patient is he has any qyestions to call office

## 2024-10-08 ENCOUNTER — OFFICE VISIT (OUTPATIENT)
Dept: PULMONOLOGY | Age: 74
End: 2024-10-08
Payer: COMMERCIAL

## 2024-10-08 ENCOUNTER — TELEPHONE (OUTPATIENT)
Dept: FAMILY MEDICINE CLINIC | Age: 74
End: 2024-10-08

## 2024-10-08 ENCOUNTER — TELEPHONE (OUTPATIENT)
Dept: PULMONOLOGY | Age: 74
End: 2024-10-08

## 2024-10-08 VITALS
BODY MASS INDEX: 32.75 KG/M2 | WEIGHT: 228.8 LBS | TEMPERATURE: 97.8 F | RESPIRATION RATE: 16 BRPM | HEIGHT: 70 IN | OXYGEN SATURATION: 98 % | HEART RATE: 70 BPM | SYSTOLIC BLOOD PRESSURE: 125 MMHG | DIASTOLIC BLOOD PRESSURE: 86 MMHG

## 2024-10-08 DIAGNOSIS — G47.33 OSA ON CPAP: Primary | ICD-10-CM

## 2024-10-08 PROCEDURE — 3074F SYST BP LT 130 MM HG: CPT | Performed by: INTERNAL MEDICINE

## 2024-10-08 PROCEDURE — G8427 DOCREV CUR MEDS BY ELIG CLIN: HCPCS | Performed by: INTERNAL MEDICINE

## 2024-10-08 PROCEDURE — G8484 FLU IMMUNIZE NO ADMIN: HCPCS | Performed by: INTERNAL MEDICINE

## 2024-10-08 PROCEDURE — 3079F DIAST BP 80-89 MM HG: CPT | Performed by: INTERNAL MEDICINE

## 2024-10-08 PROCEDURE — 3017F COLORECTAL CA SCREEN DOC REV: CPT | Performed by: INTERNAL MEDICINE

## 2024-10-08 PROCEDURE — G8417 CALC BMI ABV UP PARAM F/U: HCPCS | Performed by: INTERNAL MEDICINE

## 2024-10-08 PROCEDURE — 99204 OFFICE O/P NEW MOD 45 MIN: CPT | Performed by: INTERNAL MEDICINE

## 2024-10-08 PROCEDURE — 1123F ACP DISCUSS/DSCN MKR DOCD: CPT | Performed by: INTERNAL MEDICINE

## 2024-10-08 PROCEDURE — 1036F TOBACCO NON-USER: CPT | Performed by: INTERNAL MEDICINE

## 2024-10-08 ASSESSMENT — SLEEP AND FATIGUE QUESTIONNAIRES
HOW LIKELY ARE YOU TO NOD OFF OR FALL ASLEEP WHILE LYING DOWN TO REST IN THE AFTERNOON WHEN CIRCUMSTANCES PERMIT: MODERATE CHANCE OF DOZING
HOW LIKELY ARE YOU TO NOD OFF OR FALL ASLEEP WHILE SITTING AND TALKING TO SOMEONE: WOULD NEVER DOZE
HOW LIKELY ARE YOU TO NOD OFF OR FALL ASLEEP WHILE WATCHING TV: WOULD NEVER DOZE
HOW LIKELY ARE YOU TO NOD OFF OR FALL ASLEEP WHEN YOU ARE A PASSENGER IN A CAR FOR AN HOUR WITHOUT A BREAK: WOULD NEVER DOZE
HOW LIKELY ARE YOU TO NOD OFF OR FALL ASLEEP IN A CAR, WHILE STOPPED FOR A FEW MINUTES IN TRAFFIC: WOULD NEVER DOZE
HOW LIKELY ARE YOU TO NOD OFF OR FALL ASLEEP WHILE SITTING QUIETLY AFTER LUNCH WITHOUT ALCOHOL: MODERATE CHANCE OF DOZING
ESS TOTAL SCORE: 4
HOW LIKELY ARE YOU TO NOD OFF OR FALL ASLEEP WHILE SITTING INACTIVE IN A PUBLIC PLACE: WOULD NEVER DOZE
HOW LIKELY ARE YOU TO NOD OFF OR FALL ASLEEP WHILE SITTING AND READING: WOULD NEVER DOZE

## 2024-10-08 NOTE — PROGRESS NOTES
[facility]       CC/REFERRING PROVIDER:  Patient is being seen at the request of  Darien Salazar for a consultation for Inspire hypoglossal nerve stimulation for CLARISSE     PRESENTING HPI 10/8/24 : This is a 74-year-old male who has a several year history of poor memory and concentration who was recently evaluated by neurology and sent to see us for evaluation for treatment for sleep apnea.  He has known CLARISSE with an AHI of 22 on 2017 split-night study.  He is currently treated with APAP 4-9 with very poor compliance, uses about 20% of nights.  When he uses it his AHI is 2.4 with a median pressure of 7, 95th percentile of 8.6.  He does not feel he benefits from CPAP.  He does not think it helps his cognition or his overall sleep.  He does feel sleepy during the daytime.  He frequently takes a nap of about 30 minutes daily.  He does get up at least once at night to use the restroom.  He has symptoms of neuropathy but not RLS.   Typical bedtime 1 AM, typical rise time 7:30 AM without an alarm.  He does take a nap almost daily.        10/8/2024     9:58 AM 3/7/2023     3:35 PM 7/18/2022     1:39 PM 1/13/2022     1:41 PM 6/22/2020     9:52 AM 1/16/2019     2:36 PM 5/3/2018     8:20 AM   Sleep Medicine   Sitting and reading 0 1 2 0 1 2 2   Watching TV 0 0 0 1 3 2 1   Sitting, inactive in a public place (e.g. a theatre or a meeting) 0 0 0 0 1 2 1   As a passenger in a car for an hour without a break 0 0 0 0 2 0 2   Lying down to rest in the afternoon when circumstances permit 2 2 1 3 3 2 3   Sitting and talking to someone 0 0 0 0 0 0 0   Sitting quietly after a lunch without alcohol 2 1 0 3 0 0 1   In a car, while stopped for a few minutes in traffic 0 0 0 1 0 0 1   [place] Sleepiness Score 4 4 3 8 10 8 11   Neck (Inches) 19 19.75  20.25  19 19       Past Medical History:   Diagnosis Date    Arthritis     Asthma     Reactive airway disease    BPH (benign prostatic hypertrophy) 2013    Dr Funes    CAD

## 2024-10-14 RX ORDER — PRAVASTATIN SODIUM 20 MG
20 TABLET ORAL DAILY
Qty: 90 TABLET | Refills: 0 | Status: SHIPPED | OUTPATIENT
Start: 2024-10-14

## 2024-10-15 ENCOUNTER — TELEMEDICINE (OUTPATIENT)
Dept: FAMILY MEDICINE CLINIC | Age: 74
End: 2024-10-15
Payer: MEDICARE

## 2024-10-15 DIAGNOSIS — Z00.00 INITIAL MEDICARE ANNUAL WELLNESS VISIT: Primary | ICD-10-CM

## 2024-10-15 PROCEDURE — G8484 FLU IMMUNIZE NO ADMIN: HCPCS | Performed by: FAMILY MEDICINE

## 2024-10-15 PROCEDURE — 3017F COLORECTAL CA SCREEN DOC REV: CPT | Performed by: FAMILY MEDICINE

## 2024-10-15 PROCEDURE — 1123F ACP DISCUSS/DSCN MKR DOCD: CPT | Performed by: FAMILY MEDICINE

## 2024-10-15 PROCEDURE — G0439 PPPS, SUBSEQ VISIT: HCPCS | Performed by: FAMILY MEDICINE

## 2024-10-15 ASSESSMENT — PATIENT HEALTH QUESTIONNAIRE - PHQ9
2. FEELING DOWN, DEPRESSED OR HOPELESS: NOT AT ALL
SUM OF ALL RESPONSES TO PHQ QUESTIONS 1-9: 0
SUM OF ALL RESPONSES TO PHQ QUESTIONS 1-9: 0
SUM OF ALL RESPONSES TO PHQ9 QUESTIONS 1 & 2: 0
1. LITTLE INTEREST OR PLEASURE IN DOING THINGS: NOT AT ALL
SUM OF ALL RESPONSES TO PHQ QUESTIONS 1-9: 0
SUM OF ALL RESPONSES TO PHQ QUESTIONS 1-9: 0

## 2024-10-15 ASSESSMENT — LIFESTYLE VARIABLES
HOW OFTEN DO YOU HAVE A DRINK CONTAINING ALCOHOL: MONTHLY OR LESS
HOW MANY STANDARD DRINKS CONTAINING ALCOHOL DO YOU HAVE ON A TYPICAL DAY: 1 OR 2

## 2024-10-15 NOTE — PATIENT INSTRUCTIONS
Tub seats fitted with non-slip material on the legs allow you to wash sitting down. For people with limited mobility, bathtub transfer benches allow you to slide safely into the tub.    Raised toilet seats and toilet safety rails are helpful for those with knee or hip problems.    In the Bedroom    Make sure you use a nightlight and that the area around your bed is clear of potential obstacles.    Be careful with electric blankets and never go to sleep with a heating pad, which can cause serious burns even if on a low setting.    Use fire-resistant mattress covers and pillows, and NEVER smoke in bed.    Keep a phone next to the bed that is programmed to dial 911 at the push of a button.      If you have a chronic condition, you may want to sign on with an automatic call-in service. Typically the system includes a small pendant that connects directly to an emergency medical voice-response system. You should also make arrangements to stay in contact with someonefriend, neighbor, family memberon a regular schedule.   Fire Prevention   According to the National S.A.F.E. (Smoke Alarms for Every) Home Foundation, senior citizens are one of the two highest risk groups for death and serious injuries due to residential fires.   When cooking, wear short-sleeved items, never a bulky long-sleeved robe.    The [facility]'s Safety Checklist for Older Consumers emphasizes the importance of checking basements, garages, workshops and storage areas for fire hazards, such as volatile liquids, piles of old rags or clothing and overloaded circuits.    Never smoke in bed or when lying down on a couch or recliner chair.    Small portable electric or kerosene heaters are responsible for many home fires and should be used cautiously if at all. If you do use one, be sure to keep them away from flammable materials.    In case of fire, make sure you have a pre-established emergency exit plan.    Have a professional check your fireplace and other

## 2024-10-15 NOTE — PROGRESS NOTES
Medicare Annual Wellness Visit    Bijan Bloom is here for Medicare AWV    Assessment & Plan   Initial Medicare annual wellness visit  Recommendations for Preventive Services Due: see orders and patient instructions/AVS.  Recommended screening schedule for the next 5-10 years is provided to the patient in written form: see Patient Instructions/AVS.     Return in 1 year (on 10/15/2025) for Medicare AWV.     Subjective       Patient's complete Health Risk Assessment and screening values have been reviewed and are found in Flowsheets. The following problems were reviewed today and where indicated follow up appointments were made and/or referrals ordered.    Positive Risk Factor Screenings with Interventions:    Fall Risk:  Do you feel unsteady or are you worried about falling? : (!) yes (uses cane)  2 or more falls in past year?: (!) yes  Fall with injury in past year?: no     Interventions:    Reviewed medications, home hazards, visual acuity, and co-morbidities that can increase risk for falls  See AVS for additional education material               Abnormal BMI (obese):  There is no height or weight on file to calculate BMI. (!) Abnormal  Interventions:  See AVS for additional education material             Advanced Directives:  Do you have a Living Will?: (!) No (office will mail paperwork)    Intervention:  has NO advanced directive - information provided                     Objective    Patient-Reported Vitals  No data recorded               Allergies   Allergen Reactions    Lyrica [Pregabalin] Swelling    Metoclopramide Hives     tremor  tremor  tremor    Simvastatin Hives     Increased CPK  Increased CPK  Increased CPK    Amlodipine Other (See Comments)     Feet edema     Prior to Visit Medications    Medication Sig Taking? Authorizing Provider   pravastatin (PRAVACHOL) 20 MG tablet TAKE 1 TABLET BY MOUTH DAILY Yes Antonio Yan MD   warfarin (COUMADIN) 1 MG tablet TAKE 1 TABLET BY MOUTH DAILY Yes

## 2024-10-21 ENCOUNTER — TELEPHONE (OUTPATIENT)
Dept: PULMONOLOGY | Age: 74
End: 2024-10-21

## 2024-10-21 ENCOUNTER — ANTI-COAG VISIT (OUTPATIENT)
Dept: PHARMACY | Age: 74
End: 2024-10-21
Payer: COMMERCIAL

## 2024-10-21 DIAGNOSIS — I48.20 CHRONIC ATRIAL FIBRILLATION (HCC): Primary | Chronic | ICD-10-CM

## 2024-10-21 LAB
INTERNATIONAL NORMALIZATION RATIO, POC: 1.4
PROTHROMBIN TIME, POC: 0

## 2024-10-21 PROCEDURE — 99211 OFF/OP EST MAY X REQ PHY/QHP: CPT | Performed by: PHARMACIST

## 2024-10-21 PROCEDURE — 85610 PROTHROMBIN TIME: CPT | Performed by: PHARMACIST

## 2024-10-21 NOTE — TELEPHONE ENCOUNTER
Patient called into office, LVM stating he needed to cancel his appt. Called pt back, EBENEZERM for him to call office to r/s

## 2024-10-21 NOTE — PROGRESS NOTES
is here for management of anticoagulation for AFib.   PMH also significant for DM, Hyopthyroid, GERD, HTN, CAD, COPD.  He presents today w/out complaint.  Pt verifies dosing regimen as listed above.  Pt denies s/s bleeding/bruising/swelling/SOB.  No BRBPR. No melena.  Reviewed pt medication list.  No changes in OTC/herbal medications.  Reviewed dietary concerns. Pt states that he does not eat a lot of greens.  No missed doses.    Last seen ~5 months ago.    No changes per pt.  Does not think he has missed any doses.    Will increase dose today.    Pt asked about home monitoring. Provided with [employer] pt enrollment form.    INR 1.4 is below the acceptable therapeutic range of 2-3.  Recommend to increase dose to 7.5 mg every day except 10 mg Q MWF  Patient has 5 mg tablets.  Will continue to monitor and check INR in 2 weeks.  Dosing reminder card given with phone number, appointment date, and time.  Return to clinic: 24 @ 1115 AM    Rajiv Anne PharmD 11:22 AM EDT 10/21/24    For Pharmacy Admin Tracking Only    Intervention Detail: Adherence Monitorin and Dose Adjustment: 1, reason: Therapy Optimization  Total # of Interventions Recommended: 2  Total # of Interventions Accepted: 2  Time Spent (min): 15

## 2024-11-04 ENCOUNTER — ANTI-COAG VISIT (OUTPATIENT)
Dept: PHARMACY | Age: 74
End: 2024-11-04
Payer: MEDICARE

## 2024-11-04 DIAGNOSIS — I48.20 CHRONIC ATRIAL FIBRILLATION (HCC): Primary | Chronic | ICD-10-CM

## 2024-11-04 LAB
INTERNATIONAL NORMALIZATION RATIO, POC: 1.9
PROTHROMBIN TIME, POC: 0

## 2024-11-04 PROCEDURE — 85610 PROTHROMBIN TIME: CPT | Performed by: PHARMACIST

## 2024-11-04 PROCEDURE — 99211 OFF/OP EST MAY X REQ PHY/QHP: CPT | Performed by: PHARMACIST

## 2024-11-04 NOTE — PROGRESS NOTES
is here for management of anticoagulation for AFib.   PMH also significant for DM, Hyopthyroid, GERD, HTN, CAD, COPD.  He presents today w/out complaint.  Pt verifies dosing regimen as listed above.  Pt denies s/s bleeding/bruising/swelling/SOB.  No BRBPR. No melena.  Reviewed pt medication list.  No changes in OTC/herbal medications.  Reviewed dietary concerns. Pt states that he does not eat a lot of greens.  No missed doses.    No changes per pt.  Does not think he has missed any doses.      INR 1.9 is slightly below the acceptable therapeutic range of 2-3.  Recommend to continue dose to 7.5 mg every day except 10 mg Q MWF  Patient has 5 mg tablets.  Will continue to monitor and check INR in 4 weeks.  Dosing reminder card given with phone number, appointment date, and time.  Return to clinic: 24 @ 1115 AM    Rajiv Anne PharmD 11:28 AM EST 24    For Pharmacy Admin Tracking Only    Intervention Detail: Adherence Monitorin  Total # of Interventions Recommended: 1  Total # of Interventions Accepted: 1  Time Spent (min): 15

## 2024-11-07 ENCOUNTER — TELEPHONE (OUTPATIENT)
Dept: FAMILY MEDICINE CLINIC | Age: 74
End: 2024-11-07

## 2024-11-07 NOTE — TELEPHONE ENCOUNTER
Glenna morse/ [facility] called and requested copy of diabetic eye exam be faxed to fax # [phone]

## 2024-11-13 RX ORDER — WARFARIN SODIUM 5 MG/1
7.5 TABLET ORAL DAILY
Qty: 135 TABLET | Refills: 3 | Status: SHIPPED | OUTPATIENT
Start: 2024-11-13

## 2024-11-18 NOTE — PROGRESS NOTES
[facility]   Cardiac Followup    Referring Provider:  Jose Tena MD     Chief Complaint   Patient presents with    1 Year Follow Up    Atrial Fibrillation    Hypertension    Hyperlipidemia    Coronary Artery Disease    Cardiomyopathy        Subjective: Patient is being seen today for cardiology follow up for mild NOCAD,  HTN, and chronic afib;  c/o balance issues and recurrent falls today    Past Medical History:    Mr. Bloom is a 73yo male with PMH mild NOCAD dx 2006, undefined CM EF=35-40%, GERD, HTN, CLARISSE (does not use CPAP often), DM and diabetic neuropathy, sensory ataxia/tremor (neuro Dr. Salazar), and permanent afib on coumadin (refuses DOAC).  [facility] manages his PT/INR. Prior testing: [facility] 8/9/13 mild NOCAD.  No need for PCI. SAJI BLE 1/4/16 normal.  MORRIS 6/20-7/3/17 AFIB avg HR 85 ()  brief. Carotid US 2/22/18 negative. Farrah nux 3/7/19 LVEF 54%  Apical HK; Fixed apical defect likely due to scar; No ischemia. ECHO 3/7/19 EF=35-40% global HK (2012 and 2017 EF=55-60%). Due to worsening LVEF/SOB [facility] 3/15/19 LM: distal 40% shelf like lesion LAD: ERNESTO-2 sluggish flow, luminals LCX: small, luminals RCA: dominant, luminals LVEDP:15 LVEF: 55% [facility]  RA: 11 RV: 30/10  PA:  30/16 and mean of 20 PCWP: 15 Sats: On RA Ao: 94% RA: 61% PA: 62% CO/CI 4.28/1.8.     I d/c'd baby aspirin in Dec 2020 due to frequent falls and already on coumadin. No issues since.  EKG today afib 85bpm; NST change; IVCD (no sig change 12/23).   Today, he is doing well from a cardiac standpoint and has no difficulties doing his daily activities. He reports having balance issues and falls. He sees neuro Dr. Salazar. Patient denies current edema, chest pain, shortness of breath, palpitations, dizziness or syncope. Patient is taking all cardiac medications as prescribed and tolerates them well.      Weight today is 228# (up 13# from 12/11/23)    Past Medical History:   has a past medical history of

## 2024-11-26 ENCOUNTER — OFFICE VISIT (OUTPATIENT)
Dept: CARDIOLOGY CLINIC | Age: 74
End: 2024-11-26

## 2024-11-26 VITALS
OXYGEN SATURATION: 98 % | HEART RATE: 68 BPM | HEIGHT: 70 IN | BODY MASS INDEX: 32.64 KG/M2 | SYSTOLIC BLOOD PRESSURE: 110 MMHG | WEIGHT: 228 LBS | DIASTOLIC BLOOD PRESSURE: 68 MMHG

## 2024-11-26 DIAGNOSIS — K21.00 GASTROESOPHAGEAL REFLUX DISEASE WITH ESOPHAGITIS WITHOUT HEMORRHAGE: Primary | Chronic | ICD-10-CM

## 2024-11-26 DIAGNOSIS — Z98.890 S/P CARDIAC CATH: Chronic | ICD-10-CM

## 2024-11-26 DIAGNOSIS — I10 ESSENTIAL HYPERTENSION: Chronic | ICD-10-CM

## 2024-11-26 DIAGNOSIS — Z87.891 HISTORY OF TOBACCO ABUSE: ICD-10-CM

## 2024-11-26 DIAGNOSIS — I25.10 CORONARY ARTERY CALCIFICATION SEEN ON CT SCAN: ICD-10-CM

## 2024-11-26 DIAGNOSIS — I42.8 OTHER CARDIOMYOPATHY (HCC): ICD-10-CM

## 2024-11-26 DIAGNOSIS — E78.2 MIXED HYPERLIPIDEMIA: Chronic | ICD-10-CM

## 2024-11-26 DIAGNOSIS — I48.20 CHRONIC ATRIAL FIBRILLATION (HCC): Chronic | ICD-10-CM

## 2024-11-26 RX ORDER — TAMSULOSIN HYDROCHLORIDE 0.4 MG/1
CAPSULE ORAL DAILY
COMMUNITY
Start: 2024-11-07

## 2024-11-26 NOTE — PATIENT INSTRUCTIONS
Medications reviewed in office. Medications refilled as warranted  Labs reviewed in epic and discussed with patient.  Please call us at [phone] so we can review your medication list. It is important to have an updated list. We can provide refills if needed.

## 2024-11-27 ENCOUNTER — TELEPHONE (OUTPATIENT)
Dept: CARDIOLOGY CLINIC | Age: 74
End: 2024-11-27

## 2024-12-02 ENCOUNTER — ANTI-COAG VISIT (OUTPATIENT)
Dept: PHARMACY | Age: 74
End: 2024-12-02
Payer: MEDICARE

## 2024-12-02 DIAGNOSIS — I48.20 CHRONIC ATRIAL FIBRILLATION (HCC): Primary | Chronic | ICD-10-CM

## 2024-12-02 LAB
INTERNATIONAL NORMALIZATION RATIO, POC: 2
PROTHROMBIN TIME, POC: 0

## 2024-12-02 PROCEDURE — 99211 OFF/OP EST MAY X REQ PHY/QHP: CPT | Performed by: PHARMACIST

## 2024-12-02 PROCEDURE — 85610 PROTHROMBIN TIME: CPT | Performed by: PHARMACIST

## 2024-12-02 NOTE — PROGRESS NOTES
is here for management of anticoagulation for AFib.   PMH also significant for DM, Hyopthyroid, GERD, HTN, CAD, COPD.  He presents today w/out complaint.  Pt verifies dosing regimen as listed above.  Pt denies s/s bleeding/bruising/swelling/SOB.  No BRBPR. No melena.  Reviewed pt medication list.  No changes in OTC/herbal medications.  Reviewed dietary concerns. Pt states that he does not eat a lot of greens.  No missed doses.    No changes per pt.  Does not think he has missed any doses.      INR 2.0 is within the acceptable therapeutic range of 2-3.  Recommend to continue dose to 7.5 mg every day except 10 mg Q MWF  Patient has 5 mg tablets.  Will continue to monitor and check INR in 6 weeks.  Dosing reminder card given with phone number, appointment date, and time.  Return to clinic: 25 @ 1115 AM    Rajiv Anne PharmD 11:18 AM EST 24    For Pharmacy Admin Tracking Only    Intervention Detail: Adherence Monitorin  Total # of Interventions Recommended: 1  Total # of Interventions Accepted: 1  Time Spent (min): 15

## 2024-12-13 DIAGNOSIS — I10 ESSENTIAL HYPERTENSION: ICD-10-CM

## 2024-12-16 RX ORDER — PRAVASTATIN SODIUM 20 MG
20 TABLET ORAL DAILY
Qty: 90 TABLET | Refills: 3 | Status: SHIPPED | OUTPATIENT
Start: 2024-12-16

## 2024-12-16 RX ORDER — LISINOPRIL 10 MG/1
10 TABLET ORAL DAILY
Qty: 90 TABLET | Refills: 3 | Status: SHIPPED | OUTPATIENT
Start: 2024-12-16

## 2024-12-16 NOTE — TELEPHONE ENCOUNTER
Last Office Visit: 11/26/2024 Provider: TRISTEN  **Is provider OOT? No    Next Office Visit: 11/19/25 Provider: TRISTEN  **If no OV, when does pt need to be seen? N/a  **Has patient already had 30 day supply? No    Lab orders needed? no   Encounter provider correct? Yes If not, change provider  Script changes since last refill? no    LAST LABS:   CMP:   Lab Results   Component Value Date     08/22/2024    K 4.5 08/22/2024     08/22/2024    CO2 26 08/22/2024    BUN 17 08/22/2024    CREATININE 1.0 08/22/2024    GLUCOSE 140 (H) 08/22/2024    CALCIUM 8.9 08/22/2024    BILITOT 0.6 08/22/2024    ALKPHOS 61 08/22/2024    AST 30 08/22/2024    ALT 33 08/22/2024    LABGLOM 80 08/22/2024    GFRAA >60 08/12/2022    AGRATIO 1.5 08/22/2024    GLOB 2.6 08/02/2021          Lipid:   Lab Results   Component Value Date    CHOL 124 08/22/2024    TRIG 227 (H) 08/22/2024    HDL 23 (L) 08/22/2024    LDL 56 08/22/2024    VLDL 45 08/22/2024     Liver:   Lab Results   Component Value Date    ALT 33 08/22/2024    AST 30 08/22/2024    ALKPHOS 61 08/22/2024    BILITOT 0.6 08/22/2024      **Care Everywhere? N/A

## 2024-12-19 ENCOUNTER — TELEPHONE (OUTPATIENT)
Dept: PULMONOLOGY | Age: 74
End: 2024-12-19

## 2024-12-19 NOTE — TELEPHONE ENCOUNTER
Patient's wife calling asking when patient needs next appointment with Dr. Seymour.  Please call and advise.

## 2024-12-19 NOTE — TELEPHONE ENCOUNTER
Per last ON on 10/8/24, pt was to follow up in 6 weeks from that visit.    LM informing wife that pt is due for follow up and asked her to call the office to schedule that appointment.

## 2024-12-20 ENCOUNTER — TELEPHONE (OUTPATIENT)
Dept: NEUROLOGY | Age: 74
End: 2024-12-20

## 2024-12-20 NOTE — TELEPHONE ENCOUNTER
Pt had testing done at [facility] on 12/19/24. Pt was told it would be 5-7 weeks for results.     Pt has appt on 12/23/24 for follow up. Pt wife would like to know if appt on 12/23/24 is needed, or should they need to wait until test are resulted.    Please advise.  Please call pt spouse Maru at [phone]

## 2024-12-30 DIAGNOSIS — I10 ESSENTIAL HYPERTENSION: Chronic | ICD-10-CM

## 2024-12-31 RX ORDER — METOPROLOL SUCCINATE 50 MG/1
50 TABLET, EXTENDED RELEASE ORAL DAILY
Qty: 90 TABLET | Refills: 3 | Status: SHIPPED | OUTPATIENT
Start: 2024-12-31

## 2025-01-13 ENCOUNTER — ANTI-COAG VISIT (OUTPATIENT)
Dept: PHARMACY | Age: 75
End: 2025-01-13
Payer: MEDICARE

## 2025-01-13 DIAGNOSIS — I48.20 CHRONIC ATRIAL FIBRILLATION (HCC): Primary | Chronic | ICD-10-CM

## 2025-01-13 LAB
INTERNATIONAL NORMALIZATION RATIO, POC: 1.7
PROTHROMBIN TIME, POC: 0

## 2025-01-13 PROCEDURE — 99211 OFF/OP EST MAY X REQ PHY/QHP: CPT | Performed by: PHARMACIST

## 2025-01-13 PROCEDURE — 85610 PROTHROMBIN TIME: CPT | Performed by: PHARMACIST

## 2025-01-13 NOTE — PROGRESS NOTES
is here for management of anticoagulation for AFib.   PMH also significant for DM, Hyopthyroid, GERD, HTN, CAD, COPD.  He presents today w/out complaint.  Pt verifies dosing regimen as listed above.  Pt denies s/s bleeding/bruising/swelling/SOB.  No BRBPR. No melena.  Reviewed pt medication list.  No changes in OTC/herbal medications.  Reviewed dietary concerns. Pt states that he does not eat a lot of greens.  No missed doses.    No changes per pt.  Does not think he has missed any doses.  He has been eating more around the holidays, otherwise no change.      INR 1.7 is below the acceptable therapeutic range of 2-3.  Recommend to increase dose to 7.5 mg every day except 10 mg Q MWF/Sat  Patient has 5 mg tablets.  Will continue to monitor and check INR in 3 weeks.  Dosing reminder card given with phone number, appointment date, and time.  Return to clinic: 2/3/25 @ 1115 AM    Robin DuffyD 11:12 AM EST 25    For Pharmacy Admin Tracking Only    Intervention Detail: Adherence Monitorin and Dose Adjustment: 1, reason: Therapy Optimization  Total # of Interventions Recommended: 2  Total # of Interventions Accepted: 2  Time Spent (min): 15

## 2025-01-17 ENCOUNTER — OFFICE VISIT (OUTPATIENT)
Age: 75
End: 2025-01-17
Payer: MEDICARE

## 2025-01-17 VITALS
OXYGEN SATURATION: 95 % | BODY MASS INDEX: 32.64 KG/M2 | HEIGHT: 70 IN | RESPIRATION RATE: 16 BRPM | DIASTOLIC BLOOD PRESSURE: 76 MMHG | SYSTOLIC BLOOD PRESSURE: 126 MMHG | HEART RATE: 75 BPM | WEIGHT: 228 LBS

## 2025-01-17 DIAGNOSIS — G47.33 OSA (OBSTRUCTIVE SLEEP APNEA): ICD-10-CM

## 2025-01-17 DIAGNOSIS — Z78.9 INTOLERANCE OF CONTINUOUS POSITIVE AIRWAY PRESSURE (CPAP) VENTILATION: ICD-10-CM

## 2025-01-17 DIAGNOSIS — J41.0 CHRONIC BRONCHITIS, SIMPLE (HCC): Primary | ICD-10-CM

## 2025-01-17 PROCEDURE — G8427 DOCREV CUR MEDS BY ELIG CLIN: HCPCS | Performed by: INTERNAL MEDICINE

## 2025-01-17 PROCEDURE — 3074F SYST BP LT 130 MM HG: CPT | Performed by: INTERNAL MEDICINE

## 2025-01-17 PROCEDURE — 99214 OFFICE O/P EST MOD 30 MIN: CPT | Performed by: INTERNAL MEDICINE

## 2025-01-17 PROCEDURE — 3023F SPIROM DOC REV: CPT | Performed by: INTERNAL MEDICINE

## 2025-01-17 PROCEDURE — 3017F COLORECTAL CA SCREEN DOC REV: CPT | Performed by: INTERNAL MEDICINE

## 2025-01-17 PROCEDURE — G8417 CALC BMI ABV UP PARAM F/U: HCPCS | Performed by: INTERNAL MEDICINE

## 2025-01-17 PROCEDURE — 3078F DIAST BP <80 MM HG: CPT | Performed by: INTERNAL MEDICINE

## 2025-01-17 PROCEDURE — 1159F MED LIST DOCD IN RCRD: CPT | Performed by: INTERNAL MEDICINE

## 2025-01-17 PROCEDURE — 1036F TOBACCO NON-USER: CPT | Performed by: INTERNAL MEDICINE

## 2025-01-17 PROCEDURE — 1123F ACP DISCUSS/DSCN MKR DOCD: CPT | Performed by: INTERNAL MEDICINE

## 2025-01-17 RX ORDER — FLUTICASONE PROPIONATE AND SALMETEROL 250; 50 UG/1; UG/1
1 POWDER RESPIRATORY (INHALATION) EVERY 12 HOURS
Qty: 3 EACH | Refills: 3 | Status: SHIPPED | OUTPATIENT
Start: 2025-01-17

## 2025-01-17 RX ORDER — ALBUTEROL SULFATE 90 UG/1
2 INHALANT RESPIRATORY (INHALATION) EVERY 6 HOURS PRN
Qty: 3 EACH | Refills: 3 | Status: SHIPPED | OUTPATIENT
Start: 2025-01-17

## 2025-01-17 ASSESSMENT — SLEEP AND FATIGUE QUESTIONNAIRES
HOW LIKELY ARE YOU TO NOD OFF OR FALL ASLEEP IN A CAR, WHILE STOPPED FOR A FEW MINUTES IN TRAFFIC: WOULD NEVER DOZE
HOW LIKELY ARE YOU TO NOD OFF OR FALL ASLEEP WHILE SITTING AND TALKING TO SOMEONE: WOULD NEVER DOZE
HOW LIKELY ARE YOU TO NOD OFF OR FALL ASLEEP WHILE WATCHING TV: WOULD NEVER DOZE
ESS TOTAL SCORE: 5
HOW LIKELY ARE YOU TO NOD OFF OR FALL ASLEEP WHEN YOU ARE A PASSENGER IN A CAR FOR AN HOUR WITHOUT A BREAK: WOULD NEVER DOZE
HOW LIKELY ARE YOU TO NOD OFF OR FALL ASLEEP WHILE SITTING QUIETLY AFTER LUNCH WITHOUT ALCOHOL: HIGH CHANCE OF DOZING
HOW LIKELY ARE YOU TO NOD OFF OR FALL ASLEEP WHILE SITTING AND READING: WOULD NEVER DOZE
HOW LIKELY ARE YOU TO NOD OFF OR FALL ASLEEP WHILE LYING DOWN TO REST IN THE AFTERNOON WHEN CIRCUMSTANCES PERMIT: MODERATE CHANCE OF DOZING
HOW LIKELY ARE YOU TO NOD OFF OR FALL ASLEEP WHILE SITTING INACTIVE IN A PUBLIC PLACE: WOULD NEVER DOZE

## 2025-01-17 NOTE — PATIENT INSTRUCTIONS
Please remember to bring a list of medications and any CPAP or BiPAP machines to your next appointment with the office.     Out of respect for other patients and providers, we ask that you arrive no later than your scheduled appointment time.  If you arrive later than your appointment time, you may be asked to reschedule your appointment.     Late cancellations result in other patients being unable to utilize the appointment slot to see their physician.  Please avoid cancelling your appointment less than 1 week prior to the appointment date.  Patients with multiple missed appointments within 2 years may be dismissed from the practice.     In the next few weeks, you will be receiving a survey from [facility] regarding your visit today.  Your input is valuable to us & surveys are regularly reviewed by [facility] leadership.  It is very important to us that our patients receive excellent care.  If your experience was excellent, please let us know!  If your experience was not a good one, please tell us so we can make needed corrections. We hope you are comfortable recommending us to others for their healthcare needs.     We thank you for choosing [facility]!

## 2025-01-17 NOTE — PROGRESS NOTES
[facility]       CC/REFERRING PROVIDER:  Patient is being seen at the request of  Darien Salazar for a consultation for Inspire hypoglossal nerve stimulation for CLARISSE     Interval History 1/17/25  - adjusted APAP settings at last visit to 7-11, use is 18/30 days, average usage on days used is 4:50, AHI of  0.9; uses for several days then usage falls off.  When he uses his device for several days in a row for 5 or 6 hours he does not notice any difference in subjective sleepiness, sleep quality, snoring or memory/cognition.  He perceives no benefit from CPAP.  He has lost significant weight since his original PSG.  He is on Ozempic.    PRESENTING HPI 10/8/24 : This is a 74-year-old male who has a several year history of poor memory and concentration who was recently evaluated by neurology and sent to see us for evaluation for treatment for sleep apnea.  He has known CLARISSE with an AHI of 22 on 2017 split-night study.  He is currently treated with APAP 4-9 with very poor compliance, uses about 20% of nights.  When he uses it his AHI is 2.4 with a median pressure of 7, 95th percentile of 8.6.  He does not feel he benefits from CPAP.  He does not think it helps his cognition or his overall sleep.  He does feel sleepy during the daytime.  He frequently takes a nap of about 30 minutes daily.  He does get up at least once at night to use the restroom.  He has symptoms of neuropathy but not RLS.   Typical bedtime 1 AM, typical rise time 7:30 AM without an alarm.  He does take a nap almost daily.        1/17/2025     9:28 AM 10/8/2024     9:58 AM 3/7/2023     3:35 PM 7/18/2022     1:39 PM 1/13/2022     1:41 PM 6/22/2020     9:52 AM 1/16/2019     2:36 PM   Sleep Medicine   Sitting and reading 0 0 1 2 0 1 2   Watching TV 0 0 0 0 1 3 2   Sitting, inactive in a public place (e.g. a theatre or a meeting) 0 0 0 0 0 1 2   As a passenger in a car for an hour without a break 0 0 0 0 0 2 0   Lying down to rest in the

## 2025-01-21 ENCOUNTER — HOSPITAL ENCOUNTER (OUTPATIENT)
Age: 75
Discharge: HOME | End: 2025-01-21
Payer: MEDICARE

## 2025-01-21 LAB — ESTRADIOL SERPL-MCNC: 35 PG/ML

## 2025-01-21 PROCEDURE — 82670 ASSAY OF TOTAL ESTRADIOL: CPT

## 2025-01-21 PROCEDURE — 84403 ASSAY OF TOTAL TESTOSTERONE: CPT

## 2025-01-23 ENCOUNTER — TELEPHONE (OUTPATIENT)
Dept: NEUROLOGY | Age: 75
End: 2025-01-23

## 2025-01-23 DIAGNOSIS — F03.A0 MILD DEMENTIA WITHOUT BEHAVIORAL DISTURBANCE, PSYCHOTIC DISTURBANCE, MOOD DISTURBANCE, OR ANXIETY, UNSPECIFIED DEMENTIA TYPE (HCC): Primary | ICD-10-CM

## 2025-01-23 LAB — TESTOST SERPL-MCNC: 361 NG/DL (ref 193–740)

## 2025-01-23 NOTE — TELEPHONE ENCOUNTER
Pt's wife requesting to be called with Jesus results.  She cannot attend 1/24 results appt, but wanted to be looped in as  can be forgetful about what was discussed during appt.     She would be happy to get a phone call before, during, or after his appt on 1/24.

## 2025-01-24 ENCOUNTER — OFFICE VISIT (OUTPATIENT)
Age: 75
End: 2025-01-24
Payer: MEDICARE

## 2025-01-24 VITALS
WEIGHT: 228 LBS | SYSTOLIC BLOOD PRESSURE: 179 MMHG | DIASTOLIC BLOOD PRESSURE: 49 MMHG | BODY MASS INDEX: 32.64 KG/M2 | HEART RATE: 111 BPM | OXYGEN SATURATION: 98 % | HEIGHT: 70 IN

## 2025-01-24 DIAGNOSIS — R27.8 SENSORY ATAXIA: ICD-10-CM

## 2025-01-24 DIAGNOSIS — G25.0 ESSENTIAL TREMOR: ICD-10-CM

## 2025-01-24 DIAGNOSIS — F03.A0 MILD DEMENTIA WITHOUT BEHAVIORAL DISTURBANCE, PSYCHOTIC DISTURBANCE, MOOD DISTURBANCE, OR ANXIETY, UNSPECIFIED DEMENTIA TYPE (HCC): Primary | ICD-10-CM

## 2025-01-24 DIAGNOSIS — E11.40 DIABETIC NEUROPATHY, PAINFUL (HCC): Chronic | ICD-10-CM

## 2025-01-24 PROCEDURE — 1036F TOBACCO NON-USER: CPT | Performed by: STUDENT IN AN ORGANIZED HEALTH CARE EDUCATION/TRAINING PROGRAM

## 2025-01-24 PROCEDURE — 2022F DILAT RTA XM EVC RTNOPTHY: CPT | Performed by: STUDENT IN AN ORGANIZED HEALTH CARE EDUCATION/TRAINING PROGRAM

## 2025-01-24 PROCEDURE — 1159F MED LIST DOCD IN RCRD: CPT | Performed by: STUDENT IN AN ORGANIZED HEALTH CARE EDUCATION/TRAINING PROGRAM

## 2025-01-24 PROCEDURE — 3046F HEMOGLOBIN A1C LEVEL >9.0%: CPT | Performed by: STUDENT IN AN ORGANIZED HEALTH CARE EDUCATION/TRAINING PROGRAM

## 2025-01-24 PROCEDURE — 99214 OFFICE O/P EST MOD 30 MIN: CPT | Performed by: STUDENT IN AN ORGANIZED HEALTH CARE EDUCATION/TRAINING PROGRAM

## 2025-01-24 PROCEDURE — 1123F ACP DISCUSS/DSCN MKR DOCD: CPT | Performed by: STUDENT IN AN ORGANIZED HEALTH CARE EDUCATION/TRAINING PROGRAM

## 2025-01-24 PROCEDURE — 3017F COLORECTAL CA SCREEN DOC REV: CPT | Performed by: STUDENT IN AN ORGANIZED HEALTH CARE EDUCATION/TRAINING PROGRAM

## 2025-01-24 PROCEDURE — 3078F DIAST BP <80 MM HG: CPT | Performed by: STUDENT IN AN ORGANIZED HEALTH CARE EDUCATION/TRAINING PROGRAM

## 2025-01-24 PROCEDURE — G8427 DOCREV CUR MEDS BY ELIG CLIN: HCPCS | Performed by: STUDENT IN AN ORGANIZED HEALTH CARE EDUCATION/TRAINING PROGRAM

## 2025-01-24 PROCEDURE — G8417 CALC BMI ABV UP PARAM F/U: HCPCS | Performed by: STUDENT IN AN ORGANIZED HEALTH CARE EDUCATION/TRAINING PROGRAM

## 2025-01-24 PROCEDURE — 3077F SYST BP >= 140 MM HG: CPT | Performed by: STUDENT IN AN ORGANIZED HEALTH CARE EDUCATION/TRAINING PROGRAM

## 2025-01-24 RX ORDER — DONEPEZIL HYDROCHLORIDE 5 MG/1
5 TABLET, FILM COATED ORAL NIGHTLY
Qty: 30 TABLET | Refills: 11 | Status: SHIPPED | OUTPATIENT
Start: 2025-01-24 | End: 2026-01-19

## 2025-01-24 RX ORDER — GABAPENTIN 300 MG/1
300 CAPSULE ORAL 3 TIMES DAILY
Qty: 90 CAPSULE | Refills: 11 | Status: SHIPPED | OUTPATIENT
Start: 2025-01-24 | End: 2026-01-19

## 2025-01-24 NOTE — ASSESSMENT & PLAN NOTE
Orders:    [facility]    donepezil (ARICEPT) 5 MG tablet; Take 1 tablet by mouth nightly

## 2025-01-24 NOTE — PATIENT INSTRUCTIONS
Take donepezil 5mg at bedtime. If after 4 weeks, you are tolerating the medication well but there is no apparent benefit, then let me know and we can increase the dose to 10mg at bedtime. If it is providing benefit and you are tolerating the medication after 4 week, the let me know so we can increase the dose.     Side effects to monitor for include:   Nausea, upset stomach, diarrhea  Worse tremor  Lightheadedness or passing out    STOP DRIVING  Your family needs to keep a close eye on you when usi

## 2025-01-24 NOTE — PROGRESS NOTES
History of Present Illness  The patient presents for a follow-up visit.    Since his last visit, he had neuropsychological testing performed by Murphy Quiroz, PhD, on 01/09/2025. 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 Immediate and delayed memory were significantly impaired, with difficulties in passive orientation, visuospatial memory, and marked levels of difficulty on the signal detection task. 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 A reevaluation was suggested in 9 months to assess for longitudinal change. A dopamine transporter scan performed on 10/02/2024 was normal. A brain MRI on 10/03/2024 showed mild generalized parenchymal volume loss and minimal chronic microvascular ischemia. Labs from October 2024 revealed normal vitamin B12 levels and no evidence of monoclonal gammopathy on serum protein electrophoresis.  He reports no significant changes in his condition since the last visit. He experiences intermittent tremors and maintains his cognitive abilities and memory are stable. Despite these issues, he continues to drive without any reported accidents. 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 However, he has experienced several falls, including one incident where he required assistance to descend from bleachers at a basketball game. He has previously engaged in physical therapy to improve his balance, with the most recent session occurring in the summer of 2024. He is not currently performing any balance exercises but expresses interest in resuming physical therapy. He has gained some weight and reports that his tremors interfere with his ability to write. He has not discussed the results of his cognitive test with Dr. Quiroz. He expresses interest in trying donepezil to improve his memory

## 2025-01-28 NOTE — TELEPHONE ENCOUNTER
Referral to Neuropsychology faxed to [facility] for 2nd opinion.

## 2025-01-28 NOTE — TELEPHONE ENCOUNTER
Office mailed results of Anirudh Shay to patient's wife per her request. That will go out in the mail on Wednesday 1/29/25

## 2025-01-28 NOTE — TELEPHONE ENCOUNTER
I called Bijan Bloom's wife about the NP test results. She wants a second opinion NP testing. I referred him to Dr. Chery for second opinion re: NP testing. Wife is concerned about afib interaction with donepezil. I previously explained potential AE of donepezil with Bijan and repeated this with her on the phone. There is potential for symptomatic bradycardia with donepezil. She does not think he has a memory problem so in this case he may be better off not taking donepezil. I left it to her and Bijan to decide if they want to try donepezil. I explained that the NP testing was concerning for driving safety issues. Recommended on the road driving assessment. She requested NP results be sent to her for review. Will instruct office to send the NP results to patient/wife.

## 2025-02-03 ENCOUNTER — ANTI-COAG VISIT (OUTPATIENT)
Dept: PHARMACY | Age: 75
End: 2025-02-03
Payer: MEDICARE

## 2025-02-03 DIAGNOSIS — I48.20 CHRONIC ATRIAL FIBRILLATION (HCC): Primary | Chronic | ICD-10-CM

## 2025-02-03 LAB
INTERNATIONAL NORMALIZATION RATIO, POC: 1.8
PROTHROMBIN TIME, POC: 0

## 2025-02-03 PROCEDURE — 85610 PROTHROMBIN TIME: CPT | Performed by: PHARMACIST

## 2025-02-03 PROCEDURE — 99211 OFF/OP EST MAY X REQ PHY/QHP: CPT | Performed by: PHARMACIST

## 2025-02-03 NOTE — PROGRESS NOTES
is here for management of anticoagulation for AFib.   PMH also significant for DM, Hyopthyroid, GERD, HTN, CAD, COPD.  He presents today w/out complaint.  Pt verifies dosing regimen as listed above.  Pt denies s/s bleeding/bruising/swelling/SOB.  No BRBPR. No melena.  Reviewed pt medication list.  No changes in OTC/herbal medications.  Reviewed dietary concerns. Pt states that he does not eat a lot of greens.  No missed doses.    No changes per pt.  Does not think he has missed any doses.    INR slightly improved today after dose increase last visit but remains below goal. Will make another small dose increase.      INR 1.8 is below the acceptable therapeutic range of 2-3.  Recommend to increase dose to 10 mg daily except 7.5 mg Q Sun/Thu  Patient has 5 mg tablets.  Will continue to monitor and check INR in 4 weeks per pt request  Dosing reminder card given with phone number, appointment date, and time.  Return to clinic: 3/3/25 @ 1100 AM    Robin DuffyD 11:19 AM EST 2/3/25    For Pharmacy Admin Tracking Only    Intervention Detail: Adherence Monitorin and Dose Adjustment: 1, reason: Therapy Optimization  Total # of Interventions Recommended: 2  Total # of Interventions Accepted: 2  Time Spent (min): 15

## 2025-02-04 ENCOUNTER — HOSPITAL ENCOUNTER (OUTPATIENT)
Age: 75
Discharge: HOME | End: 2025-02-04
Payer: MEDICARE

## 2025-02-04 LAB
ALBUMIN SERPL-MCNC: 4.1 G/DL (ref 3.4–5)
ALBUMIN/GLOB SERPL: 1.5 {RATIO} (ref 1.1–2.2)
ALP SERPL-CCNC: 71 U/L (ref 40–129)
ALT SERPL-CCNC: 19 U/L (ref 10–40)
ANION GAP SERPL CALCULATED.3IONS-SCNC: 9 MMOL/L (ref 3–16)
AST SERPL-CCNC: 21 U/L (ref 15–37)
BILIRUB SERPL-MCNC: 0.4 MG/DL (ref 0–1)
BUN SERPL-MCNC: 13 MG/DL (ref 7–20)
CALCIUM SERPL-MCNC: 8.7 MG/DL (ref 8.3–10.6)
CHLORIDE SERPL-SCNC: 108 MMOL/L (ref 99–110)
CO2 SERPL-SCNC: 24 MMOL/L (ref 21–32)
CREAT SERPL-MCNC: 1 MG/DL (ref 0.8–1.3)
GFR SERPLBLD CREATININE-BSD FMLA CKD-EPI: 80 ML/MIN/{1.73_M2}
GLUCOSE P FAST SERPL-MCNC: 140 MG/DL (ref 70–99)
POTASSIUM SERPL-SCNC: 4.2 MMOL/L (ref 3.5–5.1)
PROT SERPL-MCNC: 6.8 G/DL (ref 6.4–8.2)
SODIUM SERPL-SCNC: 141 MMOL/L (ref 136–145)

## 2025-02-04 PROCEDURE — 82570 ASSAY OF URINE CREATININE: CPT

## 2025-02-04 PROCEDURE — 82043 UR ALBUMIN QUANTITATIVE: CPT

## 2025-02-04 PROCEDURE — 84481 FREE ASSAY (FT-3): CPT

## 2025-02-04 PROCEDURE — 80053 COMPREHEN METABOLIC PANEL: CPT

## 2025-02-04 PROCEDURE — 84439 ASSAY OF FREE THYROXINE: CPT

## 2025-02-04 PROCEDURE — 80061 LIPID PANEL: CPT

## 2025-02-04 PROCEDURE — 84443 ASSAY THYROID STIM HORMONE: CPT

## 2025-02-04 PROCEDURE — 83036 HEMOGLOBIN GLYCOSYLATED A1C: CPT

## 2025-02-05 LAB
CHOLEST SERPL-MCNC: 130 MG/DL (ref 0–199)
CREAT UR-MCNC: 93.8 MG/DL (ref 39–259)
HDLC SERPL-MCNC: 31 MG/DL (ref 40–60)
LDL CHOLESTEROL: 62 MG/DL
MICROALBUMIN UR DL<=1MG/L-MCNC: <1.2 MG/DL
MICROALBUMIN/CREAT UR: NORMAL MG/G (ref 0–30)
T3FREE SERPL-MCNC: 2.6 PG/ML (ref 2.3–4.2)
T4 FREE SERPL-MCNC: 1.1 NG/DL (ref 0.9–1.8)
TRIGL SERPL-MCNC: 183 MG/DL (ref 0–150)
TSH SERPL DL<=0.005 MIU/L-ACNC: 2.81 UIU/ML (ref 0.27–4.2)
VLDLC SERPL CALC-MCNC: 37 MG/DL

## 2025-02-06 LAB
EST. AVERAGE GLUCOSE BLD GHB EST-MCNC: 159.9 MG/DL
HBA1C MFR BLD: 7.2 %

## 2025-02-11 ENCOUNTER — OFFICE VISIT (OUTPATIENT)
Dept: FAMILY MEDICINE CLINIC | Age: 75
End: 2025-02-11

## 2025-02-11 VITALS
OXYGEN SATURATION: 96 % | HEART RATE: 77 BPM | WEIGHT: 229 LBS | DIASTOLIC BLOOD PRESSURE: 65 MMHG | TEMPERATURE: 97.5 F | SYSTOLIC BLOOD PRESSURE: 101 MMHG | BODY MASS INDEX: 32.86 KG/M2

## 2025-02-11 DIAGNOSIS — Z79.4 TYPE 2 DIABETES MELLITUS WITH DIABETIC POLYNEUROPATHY, WITH LONG-TERM CURRENT USE OF INSULIN (HCC): ICD-10-CM

## 2025-02-11 DIAGNOSIS — I10 ESSENTIAL HYPERTENSION: Chronic | ICD-10-CM

## 2025-02-11 DIAGNOSIS — E03.9 ACQUIRED HYPOTHYROIDISM: Chronic | ICD-10-CM

## 2025-02-11 DIAGNOSIS — E11.42 TYPE 2 DIABETES MELLITUS WITH DIABETIC POLYNEUROPATHY, WITH LONG-TERM CURRENT USE OF INSULIN (HCC): ICD-10-CM

## 2025-02-11 DIAGNOSIS — R41.3 MEMORY LOSS OR IMPAIRMENT: Primary | ICD-10-CM

## 2025-02-11 DIAGNOSIS — G25.0 ESSENTIAL TREMOR: ICD-10-CM

## 2025-02-11 DIAGNOSIS — I48.20 CHRONIC ATRIAL FIBRILLATION (HCC): ICD-10-CM

## 2025-02-11 RX ORDER — MIRABEGRON 50 MG/1
50 TABLET, FILM COATED, EXTENDED RELEASE ORAL DAILY
COMMUNITY
Start: 2025-01-16

## 2025-02-11 SDOH — ECONOMIC STABILITY: FOOD INSECURITY: WITHIN THE PAST 12 MONTHS, THE FOOD YOU BOUGHT JUST DIDN'T LAST AND YOU DIDN'T HAVE MONEY TO GET MORE.: NEVER TRUE

## 2025-02-11 SDOH — ECONOMIC STABILITY: FOOD INSECURITY: WITHIN THE PAST 12 MONTHS, YOU WORRIED THAT YOUR FOOD WOULD RUN OUT BEFORE YOU GOT MONEY TO BUY MORE.: NEVER TRUE

## 2025-02-11 ASSESSMENT — PATIENT HEALTH QUESTIONNAIRE - PHQ9
2. FEELING DOWN, DEPRESSED OR HOPELESS: NOT AT ALL
SUM OF ALL RESPONSES TO PHQ QUESTIONS 1-9: 0
SUM OF ALL RESPONSES TO PHQ9 QUESTIONS 1 & 2: 0
1. LITTLE INTEREST OR PLEASURE IN DOING THINGS: NOT AT ALL
SUM OF ALL RESPONSES TO PHQ QUESTIONS 1-9: 0

## 2025-02-11 NOTE — PROGRESS NOTES
He presents today for routine follow-up of his chronic health problems.  He was recently diagnosed with possible mild dementia by the neurologist after doing neuropsych testing.  He states he went to the neurologist because of his tremor to make sure he did not have Parkinson's disease.  The neurologist felt that his tremor was more consistent with an essential tremor.  He did neuropsych testing which showed the possibility of mild dementia.  His MRI was unremarkable.  His lab workup was unremarkable.  His wife states that he has a same short-term memory issues that he has had for many years and she has not seen any change in his memory.  Patient states that a lot of the questions they ask him on the neuropsych testing he can do even when he was younger that he did not do well in school.  His wife has not noticed any changes in his executive function and she states he has always had forgetfulness.  He is still going to endocrinology for his diabetes and he just had a bunch of lab work which we reviewed today ordered by the endocrinologist.  His A1c has been okay and thyroid function okay and liver labs normal  Tests and documents reviewed today: Labs done last week by his endocrinologist reviewed.     Objective:   /65   Pulse 77   Temp 97.5 °F (36.4 °C) (Oral)   Wt 103.9 kg (229 lb)   SpO2 96%   BMI 32.86 kg/m²   BP Readings from Last 3 Encounters:   02/11/25 101/65   01/24/25 (!) 179/49   01/17/25 126/76     Physical Exam  Vitals reviewed.   Constitutional:       Appearance: He is well-developed. He is not toxic-appearing.   HENT:      Head: Normocephalic.   Neck:      Thyroid: No thyroid mass or thyromegaly.   Cardiovascular:      Rate and Rhythm: Normal rate and regular rhythm.      Heart sounds: Normal heart sounds. No murmur heard.  Pulmonary:      Effort: No respiratory distress.      Breath sounds: Normal breath sounds. No wheezing or rales.   Abdominal:      General: There is no distension.

## 2025-02-11 NOTE — PATIENT INSTRUCTIONS
Please read the healthy family handout that you were given and share it with your family.       Please compare this printed medication list with your medications at home to be sure they are the same.  If you have any medications that are different please contact us immediately at [phone].     Also review your allergies that we have listed, these may also include medications that you have not been able to tolerate, make sure everything listed is correct. If you have any allergies that are different please contact us immediately at [phone].     You may receive a survey in the mail or by email asking about your experience during your visit today. Please complete and return to us so we know how we are serving you.

## 2025-03-03 ENCOUNTER — ANTI-COAG VISIT (OUTPATIENT)
Dept: PHARMACY | Age: 75
End: 2025-03-03
Payer: MEDICARE

## 2025-03-03 DIAGNOSIS — I48.20 CHRONIC ATRIAL FIBRILLATION (HCC): Primary | Chronic | ICD-10-CM

## 2025-03-03 LAB
INTERNATIONAL NORMALIZATION RATIO, POC: 2.3
PROTHROMBIN TIME, POC: 0

## 2025-03-03 PROCEDURE — 99211 OFF/OP EST MAY X REQ PHY/QHP: CPT | Performed by: PHARMACIST

## 2025-03-03 PROCEDURE — 85610 PROTHROMBIN TIME: CPT | Performed by: PHARMACIST

## 2025-03-03 NOTE — PROGRESS NOTES
is here for management of anticoagulation for AFib.   PMH also significant for DM, Hyopthyroid, GERD, HTN, CAD, COPD.  He presents today w/out complaint.  Pt verifies dosing regimen as listed above.  Pt denies s/s bleeding/bruising/swelling/SOB.  No BRBPR. No melena.  Reviewed pt medication list.  No changes in OTC/herbal medications.  Reviewed dietary concerns. Pt states that he does not eat a lot of greens.  No missed doses.    No changes per pt.  Does not think he has missed any doses.    He did start taking ivermectin.  Studies show conflicting data, some caused increase in INR, others did not show increase.    INR 2.3 is within the acceptable therapeutic range of 2-3.  Recommend to continue dose of 10 mg daily except 7.5 mg Q Sun/Thu  Patient has 5 mg tablets.  Will continue to monitor and check INR in 6 weeks per pt request  Dosing reminder card given with phone number, appointment date, and time.  Return to clinic: 25 @ 1100 AM    Robin DuffyD 11:09 AM EST 3/3/25    For Pharmacy Admin Tracking Only    Intervention Detail: Adherence Monitorin and Dose Adjustment: 1, reason: Therapy Optimization  Total # of Interventions Recommended: 2  Total # of Interventions Accepted: 2  Time Spent (min): 15

## 2025-04-14 ENCOUNTER — ANTI-COAG VISIT (OUTPATIENT)
Dept: PHARMACY | Age: 75
End: 2025-04-14
Payer: COMMERCIAL

## 2025-04-14 DIAGNOSIS — I48.20 CHRONIC ATRIAL FIBRILLATION (HCC): Primary | Chronic | ICD-10-CM

## 2025-04-14 LAB
INTERNATIONAL NORMALIZATION RATIO, POC: 2.6
PROTHROMBIN TIME, POC: 0

## 2025-04-14 PROCEDURE — 99211 OFF/OP EST MAY X REQ PHY/QHP: CPT | Performed by: PHARMACIST

## 2025-04-14 PROCEDURE — 85610 PROTHROMBIN TIME: CPT | Performed by: PHARMACIST

## 2025-04-14 NOTE — PROGRESS NOTES
is here for management of anticoagulation for AFib.   PMH also significant for DM, Hyopthyroid, GERD, HTN, CAD, COPD.  He presents today w/out complaint.  Pt verifies dosing regimen as listed above.  Pt denies s/s bleeding/bruising/swelling/SOB.  No BRBPR. No melena.  Reviewed pt medication list.  No changes in OTC/herbal medications.  Reviewed dietary concerns. Pt states that he does not eat a lot of greens.  No missed doses.    No changes per pt.  Does not think he has missed any doses.    He does have some bruising from a recent fall.      INR 2.6 is within the acceptable therapeutic range of 2-3.  Recommend to continue dose of 10 mg daily except 7.5 mg Q Sun/Thu  Patient has 5 mg tablets.  Will continue to monitor and check INR in 6 weeks per pt request  Dosing reminder card given with phone number, appointment date, and time.  Return to clinic: 25 @ 1100 AM    Rajiv Anne PharmD 11:09 AM EDT 25    For Pharmacy Admin Tracking Only    Intervention Detail: Adherence Monitorin  Total # of Interventions Recommended: 1  Total # of Interventions Accepted: 1  Time Spent (min): 15

## 2025-04-20 DIAGNOSIS — E11.40 DIABETIC NEUROPATHY, PAINFUL (HCC): ICD-10-CM

## 2025-04-21 RX ORDER — DULOXETIN HYDROCHLORIDE 60 MG/1
60 CAPSULE, DELAYED RELEASE ORAL DAILY
Qty: 90 CAPSULE | Refills: 3 | Status: SHIPPED | OUTPATIENT
Start: 2025-04-21

## 2025-05-28 ENCOUNTER — ANTI-COAG VISIT (OUTPATIENT)
Dept: PHARMACY | Age: 75
End: 2025-05-28
Payer: MEDICARE

## 2025-05-28 DIAGNOSIS — I48.20 CHRONIC ATRIAL FIBRILLATION (HCC): Primary | Chronic | ICD-10-CM

## 2025-05-28 LAB
INTERNATIONAL NORMALIZATION RATIO, POC: 2.6
PROTHROMBIN TIME, POC: 0

## 2025-05-28 PROCEDURE — 99211 OFF/OP EST MAY X REQ PHY/QHP: CPT

## 2025-05-28 PROCEDURE — 85610 PROTHROMBIN TIME: CPT

## 2025-05-28 NOTE — PROGRESS NOTES
is here for management of anticoagulation for AFib.   PMH also significant for DM, Hyopthyroid, GERD, HTN, CAD, COPD.  He presents today w/out complaint.  Pt verifies dosing regimen as listed above.  Pt denies sx/s bleeding/bruising/swelling/SOB.  Reviewed pt medication list.  No changes in OTC/herbal medications.  No dietary concerns. Pt states that he does not eat a lot of greens.  No missed doses.    No changes per pt.    INR 2.6 is within the acceptable therapeutic range of 2-3.  Recommend to continue dose of 10 mg daily except 7.5 mg Q Sun/Thu  Patient has 5 mg tablets.  Will continue to monitor and check INR in 6 weeks per pt request  Dosing reminder card given with phone number, appointment date, and time.  Return to clinic: 7/9/25 @ 1100 AM    Nadine Kelly. D.  For Pharmacy Admin Tracking Only    Intervention Detail:   Total # of Interventions Recommended: 0  Total # of Interventions Accepted: 0  Time Spent (min): 15

## 2025-07-09 ENCOUNTER — ANTI-COAG VISIT (OUTPATIENT)
Dept: PHARMACY | Age: 75
End: 2025-07-09
Payer: MEDICARE

## 2025-07-09 DIAGNOSIS — I48.20 CHRONIC ATRIAL FIBRILLATION (HCC): Primary | Chronic | ICD-10-CM

## 2025-07-09 LAB
INTERNATIONAL NORMALIZATION RATIO, POC: 2.8
PROTHROMBIN TIME, POC: 0

## 2025-07-09 PROCEDURE — 85610 PROTHROMBIN TIME: CPT | Performed by: PHARMACIST

## 2025-07-09 PROCEDURE — 99211 OFF/OP EST MAY X REQ PHY/QHP: CPT | Performed by: PHARMACIST

## 2025-07-09 NOTE — PROGRESS NOTES
is here for management of anticoagulation for AFib.   PMH also significant for DM, Hyopthyroid, GERD, HTN, CAD, COPD.  He presents today w/out complaint.  Pt verifies dosing regimen as listed above.  Pt denies sx/s bleeding/bruising/swelling/SOB.  Reviewed pt medication list.  No changes in OTC/herbal medications.  No dietary concerns. Pt states that he does not eat a lot of greens.  No missed doses.    No changes per pt.    INR 2.8 is within the acceptable therapeutic range of 2-3.  Recommend to continue dose of 10 mg daily except 7.5 mg Q Sun/Thu  Patient has 5 mg tablets.  Will continue to monitor and check INR in 6 weeks per pt request  Dosing reminder card given with phone number, appointment date, and time.  Return to clinic: 8/20/25 @ 1100 AM    Rajiv Anne, PharmD 11:08 AM EDT 7/9/25      For Pharmacy Admin Tracking Only    Intervention Detail:   Total # of Interventions Recommended: 0  Total # of Interventions Accepted: 0  Time Spent (min): 15

## 2025-07-16 LAB
HCT VFR BLD CALC: 49.2 % (ref 40–51)
HEMOGLOBIN: 16.4 G/DL (ref 13.7–17.5)
MCH RBC QN AUTO: 31 PG (ref 26–34)
MCHC RBC AUTO-ENTMCNC: 33.3 G/DL (ref 30.7–35.5)
MCV RBC AUTO: 93 FL (ref 80–100)
PDW BLD-RTO: 13.2 %
PLATELET # BLD: 233 X10(3)/MCL (ref 155–369)
PMV BLD AUTO: 10.6 FL (ref 8.8–12.5)
RBC # BLD: 5.29 X10(6)/MCL (ref 4.6–6.1)
WBC # BLD: 11.7 X10(3)/MCL (ref 3.7–10.3)

## 2025-08-12 ENCOUNTER — OFFICE VISIT (OUTPATIENT)
Dept: FAMILY MEDICINE CLINIC | Age: 75
End: 2025-08-12
Payer: MEDICARE

## 2025-08-12 VITALS
HEART RATE: 94 BPM | SYSTOLIC BLOOD PRESSURE: 95 MMHG | DIASTOLIC BLOOD PRESSURE: 63 MMHG | BODY MASS INDEX: 30.42 KG/M2 | WEIGHT: 212 LBS | OXYGEN SATURATION: 96 %

## 2025-08-12 DIAGNOSIS — N40.1 BPH ASSOCIATED WITH NOCTURIA: Chronic | ICD-10-CM

## 2025-08-12 DIAGNOSIS — J45.40 MODERATE PERSISTENT ASTHMA WITHOUT COMPLICATION: ICD-10-CM

## 2025-08-12 DIAGNOSIS — Z01.818 PREOPERATIVE EXAMINATION: Primary | ICD-10-CM

## 2025-08-12 DIAGNOSIS — Z79.4 TYPE 2 DIABETES MELLITUS WITH DIABETIC POLYNEUROPATHY, WITH LONG-TERM CURRENT USE OF INSULIN (HCC): ICD-10-CM

## 2025-08-12 DIAGNOSIS — E03.9 ACQUIRED HYPOTHYROIDISM: Chronic | ICD-10-CM

## 2025-08-12 DIAGNOSIS — Z98.890 S/P CARDIAC CATH: Chronic | ICD-10-CM

## 2025-08-12 DIAGNOSIS — E11.43 DIABETIC GASTROPARESIS (HCC): ICD-10-CM

## 2025-08-12 DIAGNOSIS — G47.33 OSA (OBSTRUCTIVE SLEEP APNEA): ICD-10-CM

## 2025-08-12 DIAGNOSIS — I10 ESSENTIAL HYPERTENSION: Chronic | ICD-10-CM

## 2025-08-12 DIAGNOSIS — K31.84 DIABETIC GASTROPARESIS (HCC): ICD-10-CM

## 2025-08-12 DIAGNOSIS — E11.42 TYPE 2 DIABETES MELLITUS WITH DIABETIC POLYNEUROPATHY, WITH LONG-TERM CURRENT USE OF INSULIN (HCC): ICD-10-CM

## 2025-08-12 DIAGNOSIS — R35.1 BPH ASSOCIATED WITH NOCTURIA: Chronic | ICD-10-CM

## 2025-08-12 DIAGNOSIS — I48.20 CHRONIC ATRIAL FIBRILLATION (HCC): Chronic | ICD-10-CM

## 2025-08-12 PROCEDURE — 2022F DILAT RTA XM EVC RTNOPTHY: CPT | Performed by: FAMILY MEDICINE

## 2025-08-12 PROCEDURE — 93000 ELECTROCARDIOGRAM COMPLETE: CPT | Performed by: FAMILY MEDICINE

## 2025-08-12 PROCEDURE — 1036F TOBACCO NON-USER: CPT | Performed by: FAMILY MEDICINE

## 2025-08-12 PROCEDURE — 1123F ACP DISCUSS/DSCN MKR DOCD: CPT | Performed by: FAMILY MEDICINE

## 2025-08-12 PROCEDURE — G8427 DOCREV CUR MEDS BY ELIG CLIN: HCPCS | Performed by: FAMILY MEDICINE

## 2025-08-12 PROCEDURE — 1159F MED LIST DOCD IN RCRD: CPT | Performed by: FAMILY MEDICINE

## 2025-08-12 PROCEDURE — G8417 CALC BMI ABV UP PARAM F/U: HCPCS | Performed by: FAMILY MEDICINE

## 2025-08-12 PROCEDURE — 3051F HG A1C>EQUAL 7.0%<8.0%: CPT | Performed by: FAMILY MEDICINE

## 2025-08-12 PROCEDURE — 3017F COLORECTAL CA SCREEN DOC REV: CPT | Performed by: FAMILY MEDICINE

## 2025-08-12 PROCEDURE — 3078F DIAST BP <80 MM HG: CPT | Performed by: FAMILY MEDICINE

## 2025-08-12 PROCEDURE — 99214 OFFICE O/P EST MOD 30 MIN: CPT | Performed by: FAMILY MEDICINE

## 2025-08-12 PROCEDURE — 3074F SYST BP LT 130 MM HG: CPT | Performed by: FAMILY MEDICINE

## 2025-08-12 RX ORDER — TESTOSTERONE UNDECANOATE 200 MG/1
200 CAPSULE, LIQUID FILLED ORAL 2 TIMES DAILY
COMMUNITY
Start: 2025-07-31

## 2025-08-12 RX ORDER — TIRZEPATIDE 5 MG/.5ML
5 INJECTION, SOLUTION SUBCUTANEOUS WEEKLY
COMMUNITY
Start: 2025-06-20

## 2025-08-13 LAB
ANION GAP SERPL CALCULATED.3IONS-SCNC: 11 MMOL/L (ref 3–16)
BASOPHILS # BLD: 0.1 K/UL (ref 0–0.2)
BASOPHILS NFR BLD: 0.9 %
BUN SERPL-MCNC: 15 MG/DL (ref 7–20)
CALCIUM SERPL-MCNC: 9.4 MG/DL (ref 8.3–10.6)
CHLORIDE SERPL-SCNC: 108 MMOL/L (ref 99–110)
CO2 SERPL-SCNC: 21 MMOL/L (ref 21–32)
CREAT SERPL-MCNC: 1.2 MG/DL (ref 0.8–1.3)
DEPRECATED RDW RBC AUTO: 13.3 % (ref 12.4–15.4)
EOSINOPHIL # BLD: 0.3 K/UL (ref 0–0.6)
EOSINOPHIL NFR BLD: 2.9 %
EST. AVERAGE GLUCOSE BLD GHB EST-MCNC: 151.3 MG/DL
GFR SERPLBLD CREATININE-BSD FMLA CKD-EPI: 64 ML/MIN/{1.73_M2}
GLUCOSE SERPL-MCNC: 101 MG/DL (ref 70–99)
HBA1C MFR BLD: 6.9 %
HCT VFR BLD AUTO: 50.2 % (ref 40.5–52.5)
HGB BLD-MCNC: 16.8 G/DL (ref 13.5–17.5)
LYMPHOCYTES # BLD: 3.7 K/UL (ref 1–5.1)
LYMPHOCYTES NFR BLD: 31.7 %
MCH RBC QN AUTO: 31 PG (ref 26–34)
MCHC RBC AUTO-ENTMCNC: 33.4 G/DL (ref 31–36)
MCV RBC AUTO: 92.8 FL (ref 80–100)
MONOCYTES # BLD: 0.7 K/UL (ref 0–1.3)
MONOCYTES NFR BLD: 6.2 %
NEUTROPHILS # BLD: 6.8 K/UL (ref 1.7–7.7)
NEUTROPHILS NFR BLD: 58.3 %
PLATELET # BLD AUTO: 238 K/UL (ref 135–450)
PMV BLD AUTO: 8.7 FL (ref 5–10.5)
POTASSIUM SERPL-SCNC: 4.4 MMOL/L (ref 3.5–5.1)
RBC # BLD AUTO: 5.41 M/UL (ref 4.2–5.9)
SODIUM SERPL-SCNC: 140 MMOL/L (ref 136–145)
TSH SERPL DL<=0.005 MIU/L-ACNC: 1.1 UIU/ML (ref 0.27–4.2)
WBC # BLD AUTO: 11.8 K/UL (ref 4–11)

## 2025-08-14 ENCOUNTER — TELEPHONE (OUTPATIENT)
Dept: FAMILY MEDICINE CLINIC | Age: 75
End: 2025-08-14

## 2025-08-27 ENCOUNTER — ANTI-COAG VISIT (OUTPATIENT)
Dept: PHARMACY | Age: 75
End: 2025-08-27
Payer: MEDICARE

## 2025-08-27 DIAGNOSIS — I48.20 CHRONIC ATRIAL FIBRILLATION (HCC): Primary | Chronic | ICD-10-CM

## 2025-08-27 LAB
INTERNATIONAL NORMALIZATION RATIO, POC: 1.8
PROTHROMBIN TIME, POC: NORMAL

## 2025-08-27 PROCEDURE — 85610 PROTHROMBIN TIME: CPT | Performed by: PHARMACIST

## 2025-08-27 PROCEDURE — 99211 OFF/OP EST MAY X REQ PHY/QHP: CPT | Performed by: PHARMACIST

## (undated) DEVICE — ALCOHOL RUBBING 16OZ 70% ISO

## (undated) DEVICE — DRAPE,UTILITY,TAPE,15X26,STERILE: Brand: MEDLINE

## (undated) DEVICE — STERILE POLYISOPRENE POWDER-FREE SURGICAL GLOVES: Brand: PROTEXIS

## (undated) DEVICE — SUTURE PERMA-HAND SZ 2-0 L30IN NONABSORBABLE BLK L26MM SH K833H

## (undated) DEVICE — SYRINGE, LUER LOCK, 10ML: Brand: MEDLINE

## (undated) DEVICE — MINOR SET UP PK

## (undated) DEVICE — UNIVERSAL BLOCK TRAY: Brand: MEDLINE INDUSTRIES, INC.

## (undated) DEVICE — Device: Brand: MEDEX

## (undated) DEVICE — 1010 S-DRAPE TOWEL DRAPE 10/BX: Brand: STERI-DRAPE™

## (undated) DEVICE — INTENDED FOR TISSUE SEPARATION, AND OTHER PROCEDURES THAT REQUIRE A SHARP SURGICAL BLADE TO PUNCTURE OR CUT.: Brand: BARD-PARKER ® DISPOSABLE SCALPELS

## (undated) DEVICE — SET BLD COLL 21GA TB L12IN NDL L0.75IN WNG GRN SIMP EZ TO

## (undated) DEVICE — GAUZE,SPONGE,4"X4",16PLY,STRL,LF,10/TRAY: Brand: MEDLINE

## (undated) DEVICE — SUTURE PDS II SZ 3-0 L27IN ABSRB VLT RB-1 L17MM 1/2 CIR Z305H

## (undated) DEVICE — SUTURE CHROMIC GUT SZ 3-0 L27IN ABSRB BRN L26MM SH 1/2 CIR G122H

## (undated) DEVICE — STRAP POS W5XL72IN FOAM KNEE AND BODY HK LOOP CLSR DISP

## (undated) DEVICE — SUTURE MCRYL SZ 3-0 L27IN ABSRB UD L26MM SH 1/2 CIR Y416H

## (undated) DEVICE — 450 ML BOTTLE OF 0.05% CHLORHEXIDINE GLUCONATE IN 99.95% STERILE WATER FOR IRRIGATION, USP AND APPLICATOR.: Brand: IRRISEPT ANTIMICROBIAL WOUND LAVAGE

## (undated) DEVICE — CATHETER URETH 16FR BLLN 5CC STD LTX 2 W F SGL DRNGE EYE M

## (undated) DEVICE — DECANTER: Brand: UNBRANDED

## (undated) DEVICE — STAPLER EXT SKIN 35 WIDE S STL STPL SQUEEZE HNDL VISISTAT

## (undated) DEVICE — SPONGE,DISSECTOR,K,XRAY,9/16"X1/4",STRL: Brand: MEDLINE

## (undated) DEVICE — COVER,MAYO STAND,STERILE: Brand: MEDLINE

## (undated) DEVICE — ADHESIVE SKIN CLSR 0.7ML TOP DERMBND ADV

## (undated) DEVICE — GAUZE SPONGES,16 PLY: Brand: CURITY

## (undated) DEVICE — YANKAUER,OPEN TIP,W/O VENT,STERILE: Brand: MEDLINE INDUSTRIES, INC.

## (undated) DEVICE — SUTURE PDS II SZ 0 L27IN ABSRB VLT L26MM CT-2 1/2 CIR Z334H

## (undated) DEVICE — SUTURE PERMAHAND SZ 3-0 L30IN NONABSORBABLE BLK SH L26MM K832H

## (undated) DEVICE — TOWEL OR BLUEE 16X26IN ST 8 PACK ORB08 16X26ORTWL

## (undated) DEVICE — SYRINGE MED 50ML LUERLOCK TIP

## (undated) DEVICE — SUTURE PDS + SZ 3 0 L27IN ABSRB VLT L17MM RB 1 1 2 CIR PDP305H

## (undated) DEVICE — GAUZE,SPONGE,4"X4",8PLY,STRL,LF,10/TRAY: Brand: MEDLINE

## (undated) DEVICE — E-Z CLEAN, NON-STICK, PTFE COATED, ELECTROSURGICAL BLADE ELECTRODE, 2.5 INCH (6.35 CM): Brand: EZ CLEAN

## (undated) DEVICE — BANDAGE,GAUZE,4.5"X4.1YD,STERILE,LF: Brand: MEDLINE

## (undated) DEVICE — 3M™ STERI-DRAPE™ INCISE DRAPE 1050 (60CM X 45CM): Brand: STERI-DRAPE™

## (undated) DEVICE — LIDOCAINE 2% JELLY UROJET PFS 25X5ML

## (undated) DEVICE — GAUZE,SPONGE,4"X4",16PLY,XRAY,STRL,LF: Brand: MEDLINE

## (undated) DEVICE — APPLICATOR PREP 26ML 0.7% IOD POVACRYLEX 74% ISO ALC ST

## (undated) DEVICE — SHEET, T, LAPAROTOMY, STERILE: Brand: MEDLINE

## (undated) DEVICE — GLOVE,SURG,SENSICARE SLT,LF,PF,7: Brand: MEDLINE

## (undated) DEVICE — 3M™ STERI-DRAPE™ INSTRUMENT POUCH 1018: Brand: STERI-DRAPE™

## (undated) DEVICE — SHEET,DRAPE,53X77,STERILE: Brand: MEDLINE

## (undated) DEVICE — DRAIN SURG FLAT W7MMXL20CM FULL PERF

## (undated) DEVICE — BAG DRNGE 2000ML ROUNDED TEARDROP W/ ANTIREFLX CHMBR DISP

## (undated) DEVICE — 3M™ IOBAN™ 2 ANTIMICROBIAL INCISE DRAPE 6650EZ: Brand: IOBAN™ 2

## (undated) DEVICE — GLOVE,SURG,SENSICARE SLT,LF,PF,7.5: Brand: MEDLINE

## (undated) DEVICE — SUTURE MCRYL + SZ 4-0 L18IN ABSRB UD L19MM PS-2 3/8 CIR MCP496G

## (undated) DEVICE — SUTURE MCRYL SZ 2-0 L27IN ABSRB VLT SH L26MM 1/2 CIR TAPR Y317H

## (undated) DEVICE — RESERVOIR,SUCTION,100CC,SILICONE: Brand: MEDLINE

## (undated) DEVICE — SUTURE PDS II SZ 2-0 L18IN ABSRB VLT SH L26MM 1/2 CIR TAPR Z775D